# Patient Record
Sex: FEMALE | Race: BLACK OR AFRICAN AMERICAN | NOT HISPANIC OR LATINO | Employment: FULL TIME | URBAN - METROPOLITAN AREA
[De-identification: names, ages, dates, MRNs, and addresses within clinical notes are randomized per-mention and may not be internally consistent; named-entity substitution may affect disease eponyms.]

---

## 2017-01-08 ENCOUNTER — HOSPITAL ENCOUNTER (EMERGENCY)
Facility: HOSPITAL | Age: 32
Discharge: HOME | End: 2017-01-08
Attending: EMERGENCY MEDICINE
Payer: COMMERCIAL

## 2017-01-08 VITALS
RESPIRATION RATE: 16 BRPM | HEART RATE: 102 BPM | SYSTOLIC BLOOD PRESSURE: 139 MMHG | WEIGHT: 293 LBS | HEIGHT: 66 IN | BODY MASS INDEX: 47.09 KG/M2 | DIASTOLIC BLOOD PRESSURE: 81 MMHG | TEMPERATURE: 98 F | OXYGEN SATURATION: 99 %

## 2017-01-08 DIAGNOSIS — B96.89 ACUTE BACTERIAL RHINOSINUSITIS: Primary | ICD-10-CM

## 2017-01-08 DIAGNOSIS — J01.90 ACUTE BACTERIAL RHINOSINUSITIS: Primary | ICD-10-CM

## 2017-01-08 PROCEDURE — 63600175 PHARM REV CODE 636 W HCPCS: Performed by: EMERGENCY MEDICINE

## 2017-01-08 PROCEDURE — 96372 THER/PROPH/DIAG INJ SC/IM: CPT

## 2017-01-08 PROCEDURE — 25000003 PHARM REV CODE 250: Performed by: EMERGENCY MEDICINE

## 2017-01-08 PROCEDURE — 99283 EMERGENCY DEPT VISIT LOW MDM: CPT | Mod: 25

## 2017-01-08 RX ORDER — BETAMETHASONE SODIUM PHOSPHATE AND BETAMETHASONE ACETATE 3; 3 MG/ML; MG/ML
6 INJECTION, SUSPENSION INTRA-ARTICULAR; INTRALESIONAL; INTRAMUSCULAR; SOFT TISSUE
Status: COMPLETED | OUTPATIENT
Start: 2017-01-08 | End: 2017-01-08

## 2017-01-08 RX ORDER — OXYMETAZOLINE HCL 0.05 %
1 SPRAY, NON-AEROSOL (ML) NASAL
Status: COMPLETED | OUTPATIENT
Start: 2017-01-08 | End: 2017-01-08

## 2017-01-08 RX ORDER — DOXYCYCLINE HYCLATE 100 MG
100 TABLET ORAL 2 TIMES DAILY
Qty: 14 TABLET | Refills: 0 | Status: SHIPPED | OUTPATIENT
Start: 2017-01-08 | End: 2017-02-26

## 2017-01-08 RX ORDER — OXYMETAZOLINE HCL 0.05 %
1 SPRAY, NON-AEROSOL (ML) NASAL 2 TIMES DAILY
Qty: 15 ML | Refills: 0 | COMMUNITY
Start: 2017-01-08 | End: 2017-01-11

## 2017-01-08 RX ADMIN — BETAMETHASONE SODIUM PHOSPHATE AND BETAMETHASONE ACETATE 6 MG: 3; 3 INJECTION, SUSPENSION INTRA-ARTICULAR; INTRALESIONAL; INTRAMUSCULAR at 12:01

## 2017-01-08 RX ADMIN — OXYMETAZOLINE HYDROCHLORIDE 1 SPRAY: 5 SPRAY NASAL at 12:01

## 2017-01-08 NOTE — ED PROVIDER NOTES
Encounter Date: 1/8/2017       History     Chief Complaint   Patient presents with    Nasal Congestion     with sinus draining. mucinex 2 days ago. no fever     Review of patient's allergies indicates:   Allergen Reactions    Shellfish containing products Rash    Penicillins      Patient is a 31 y.o. female presenting with the following complaint: sinusitis.   Sinusitis    This is a recurrent problem. The current episode started several days ago. The problem has been unchanged. Associated symptoms include congestion, sinus pressure and cough. Pertinent negatives include no chills, no sweats, no swollen glands and no shortness of breath. She has tried nothing for the symptoms.     Past Medical History   Diagnosis Date    Hypertension     Psoriasis     Seizures     Stroke      No past medical history pertinent negatives.  Past Surgical History   Procedure Laterality Date    Right toe amputation        Family History   Problem Relation Age of Onset    Cancer Neg Hx      Social History   Substance Use Topics    Smoking status: Never Smoker    Smokeless tobacco: Never Used    Alcohol use No     Review of Systems   Constitutional: Negative for chills.   HENT: Positive for congestion and sinus pressure.    Respiratory: Positive for cough. Negative for shortness of breath.    Skin: Positive for rash.        Pt with intractable psoriasis involving both external ears   All other systems reviewed and are negative.      Physical Exam   Initial Vitals   BP Pulse Resp Temp SpO2   01/08/17 0023 01/08/17 0023 01/08/17 0023 01/08/17 0023 01/08/17 0023   139/81 102 16 98.4 °F (36.9 °C) 99 %     Physical Exam    Nursing note and vitals reviewed.  Constitutional: She appears well-nourished. No distress.   HENT:   Head: Normocephalic and atraumatic.   Nose: Mucosal edema and rhinorrhea present. Right sinus exhibits maxillary sinus tenderness. Right sinus exhibits no frontal sinus tenderness. Left sinus exhibits maxillary  sinus tenderness. Left sinus exhibits no frontal sinus tenderness.   Mouth/Throat: Oropharynx is clear and moist.   Severe psoriatic changes with oozing both external ears   Eyes: Conjunctivae and EOM are normal.   Neck: Normal range of motion.   Cardiovascular: Normal rate, regular rhythm and intact distal pulses.   Pulmonary/Chest: Breath sounds normal.   Abdominal: She exhibits no distension.   Musculoskeletal: Normal range of motion.   Lymphadenopathy:     She has no cervical adenopathy.   Neurological: She is alert and oriented to person, place, and time. She has normal strength.   Skin: Skin is warm.         ED Course   Procedures  Labs Reviewed - No data to display        Possible prehypertension +/or Hypertension. Advised to see PCP for same.                    ED Course     Clinical Impression:             ICD-10-CM ICD-9-CM   1. Acute bacterial rhinosinusitis J01.90 461.9    B96.89           Fernando Zuniga MD  01/08/17 0149

## 2017-01-08 NOTE — ED AVS SNAPSHOT
[facility]  [address]  [place] [address]               Denise Barron   2017 12:25 AM   ED    Description:  Female : 1985   Department:  [facility]           Your Care was Coordinated By:     Provider Role From To    Fernando Zuniga MD Attending Provider 17 0025 --      Reason for Visit     Nasal Congestion           Diagnoses this Visit        Comments    Acute bacterial rhinosinusitis    -  Primary       ED Disposition     ED Disposition Condition Comment    Discharge             To Do List           Follow-up Information     Follow up with Kenji Evans MD. Schedule an appointment as soon as possible for a visit in 1 week.    Specialty:  Family Medicine    Contact information:    [address] [facility] [address]  [phone]         These Medications        Disp Refills Start End    doxycycline (VIBRA-TABS) 100 MG tablet 14 tablet 0 2017     Take 1 tablet (100 mg total) by mouth 2 (two) times daily. - Oral      PURCHASE these Medications (No prescription required)        Start End    oxymetazoline (AFRIN) 0.05 % nasal spray 2017    Sig - Route: 1 spray by Nasal route 2 (two) times daily. - Nasal    Class: OTC      [facility]     [facility] On Call Nurse Care Line -  Assistance  Registered nurses in the [facility] provide clinical advisement, health education, appointment booking, and other advisory services.  Call for this free service at [phone].             Medications           Message regarding Medications     Verify the changes and/or additions to your medication regime listed below are the same as discussed with your clinician today.  If any of these changes or additions are incorrect, please notify your healthcare provider.        START taking these NEW medications        Refills    doxycycline (VIBRA-TABS) 100 MG tablet 0    Sig: Take 1  tablet (100 mg total) by mouth 2 (two) times daily.    Class: Print    Route: Oral    oxymetazoline (AFRIN) 0.05 % nasal spray 0    Si spray by Nasal route 2 (two) times daily.    Class: OTC    Route: Nasal      These medications were administered today        Dose Freq    oxymetazoline 0.05 % nasal spray 1 spray 1 spray ED 1 Time    Si spray by Each Nare route ED 1 Time.    Class: Normal    Route: Each Nare    betamethasone acetate-betamethasone sodium phosphate injection 6 mg 6 mg ED 1 Time    Sig: Inject 1 mL (6 mg total) into the muscle ED 1 Time.    Class: Normal    Route: Intramuscular           Verify that the below list of medications is an accurate representation of the medications you are currently taking.  If none reported, the list may be blank. If incorrect, please contact your healthcare provider. Carry this list with you in case of emergency.           Current Medications     amlodipine (NORVASC) 5 MG tablet Take 5 mg by mouth once daily.    betamethasone acetate-betamethasone sodium phosphate injection 6 mg Inject 1 mL (6 mg total) into the muscle ED 1 Time.    doxycycline (VIBRA-TABS) 100 MG tablet Take 1 tablet (100 mg total) by mouth 2 (two) times daily.    folic acid (FOLVITE) 400 MCG tablet Take 400 mcg by mouth once daily.    hydrochlorothiazide (MICROZIDE) 12.5 mg capsule Take 12.5 mg by mouth once daily.    hydrocodone-acetaminophen 10-325mg (NORCO)  mg Tab Take 1 tablet by mouth every 4 (four) hours as needed (pain).    lamotrigine (LAMICTAL) 200 MG tablet Take 200 mg by mouth nightly.    levocetirizine (XYZAL) 5 MG tablet Take 5 mg by mouth every evening.    losartan (COZAAR) 50 MG tablet Take 50 mg by mouth once daily.    oxymetazoline (AFRIN) 0.05 % nasal spray 1 spray by Nasal route 2 (two) times daily.    oxymetazoline 0.05 % nasal spray 1 spray 1 spray by Each Nare route ED 1 Time.    warfarin (COUMADIN) 6 MG tablet Take 6 mg by mouth once daily.           Clinical  "Reference Information           Your Vitals Were     BP Pulse Temp Resp Height Weight    139/81 (BP Location: Right arm, Patient Position: Sitting) 102 98.4 °F (36.9 °C) (Oral) 16 5' 6" (1.676 m) 136.1 kg (300 lb)    Last Period SpO2 BMI          12/26/2016 99% 48.42 kg/m2        Allergies as of 1/8/2017        Reactions    Shellfish Containing Products Rash    Penicillins       Immunizations Administered on Date of Encounter - 1/8/2017     None      ED Micro, Lab, POCT     None      ED Imaging Orders     None      Discharge References/Attachments     SINUSITIS (ANTIBIOTIC TREATMENT) (ENGLISH)      Your Scheduled Appointments     Jan 11, 2017  1:20 PM CST   Established Patient Visit with Justa Rabago PA-C   [facility] ([facility])    [address]  [place] [address]   [phone]              [facility] Sign-Up     Activating your [facility] account is as easy as 1-2-3!     1) Visit [email or web address], select Sign Up Now, enter this activation code and your date of birth, then select Next.  [email or web address]  Expires: 2/22/2017 12:42 AM      2) Create a username and password to use when you visit [facility] in the future and select a security question in case you lose your password and select Next.    3) Enter your e-mail address and click Sign Up!    Additional Information  If you have questions, please e-mail [email or web address] or call [phone] to talk to our [facility] staff. Remember, [facility] is NOT to be used for urgent needs. For medical emergencies, dial 911.          [facility] complies with applicable Federal civil rights laws and does not discriminate on the basis of race, color, national origin, age, disability, or sex.        Language Assistance Services     ATTENTION: Language assistance services are available, free of charge. Please call [phone].      ATENCIÓN: Si habla español, tiene a zamora disposición servicios gratuitos de asistencia lingüística. Llame " al [phone].     RESHMA Ý: N?u b?n nói Ti?ng Vi?t, có các d?ch v? h? tr? ngôn ng? mi?n phí dành cho b?n. G?i s? [phone].

## 2017-01-13 ENCOUNTER — HOSPITAL ENCOUNTER (EMERGENCY)
Facility: HOSPITAL | Age: 32
Discharge: HOME | End: 2017-01-13
Attending: EMERGENCY MEDICINE
Payer: COMMERCIAL

## 2017-01-13 VITALS
RESPIRATION RATE: 20 BRPM | TEMPERATURE: 99 F | SYSTOLIC BLOOD PRESSURE: 102 MMHG | WEIGHT: 292 LBS | BODY MASS INDEX: 46.93 KG/M2 | OXYGEN SATURATION: 100 % | HEART RATE: 99 BPM | DIASTOLIC BLOOD PRESSURE: 60 MMHG | HEIGHT: 66 IN

## 2017-01-13 DIAGNOSIS — R56.9 SEIZURES: Primary | ICD-10-CM

## 2017-01-13 DIAGNOSIS — R79.1 SUBTHERAPEUTIC INTERNATIONAL NORMALIZED RATIO (INR): ICD-10-CM

## 2017-01-13 DIAGNOSIS — N39.0 URINARY TRACT INFECTION WITHOUT HEMATURIA, SITE UNSPECIFIED: ICD-10-CM

## 2017-01-13 DIAGNOSIS — R51.9 HEADACHE, UNSPECIFIED HEADACHE TYPE: ICD-10-CM

## 2017-01-13 LAB
ALBUMIN SERPL BCP-MCNC: 3.3 G/DL
ALP SERPL-CCNC: 60 U/L
ALT SERPL W/O P-5'-P-CCNC: 19 U/L
ANION GAP SERPL CALC-SCNC: 13 MMOL/L
ANISOCYTOSIS BLD QL SMEAR: SLIGHT
AST SERPL-CCNC: 16 U/L
B-HCG UR QL: NEGATIVE
BACTERIA #/AREA URNS AUTO: ABNORMAL /HPF
BASOPHILS # BLD AUTO: 0.01 K/UL
BASOPHILS NFR BLD: 0.1 %
BILIRUB SERPL-MCNC: 0.4 MG/DL
BILIRUB UR QL STRIP: NEGATIVE
BUN SERPL-MCNC: 7 MG/DL
CALCIUM SERPL-MCNC: 9.2 MG/DL
CHLORIDE SERPL-SCNC: 106 MMOL/L
CLARITY UR REFRACT.AUTO: CLEAR
CO2 SERPL-SCNC: 19 MMOL/L
COLOR UR AUTO: YELLOW
CREAT SERPL-MCNC: 0.8 MG/DL
DIFFERENTIAL METHOD: ABNORMAL
EOSINOPHIL # BLD AUTO: 0.1 K/UL
EOSINOPHIL NFR BLD: 1.1 %
ERYTHROCYTE [DISTWIDTH] IN BLOOD BY AUTOMATED COUNT: 21.1 %
EST. GFR  (AFRICAN AMERICAN): >60 ML/MIN/1.73 M^2
EST. GFR  (NON AFRICAN AMERICAN): >60 ML/MIN/1.73 M^2
GLUCOSE SERPL-MCNC: 85 MG/DL
GLUCOSE UR QL STRIP: NEGATIVE
HCT VFR BLD AUTO: 33.2 %
HGB BLD-MCNC: 9.9 G/DL
HGB UR QL STRIP: ABNORMAL
HYPOCHROMIA BLD QL SMEAR: ABNORMAL
INR PPP: 1.2
KETONES UR QL STRIP: NEGATIVE
LEUKOCYTE ESTERASE UR QL STRIP: ABNORMAL
LYMPHOCYTES # BLD AUTO: 1.4 K/UL
LYMPHOCYTES NFR BLD: 13.8 %
MCH RBC QN AUTO: 20.3 PG
MCHC RBC AUTO-ENTMCNC: 29.8 %
MCV RBC AUTO: 68 FL
MICROSCOPIC COMMENT: ABNORMAL
MONOCYTES # BLD AUTO: 0.8 K/UL
MONOCYTES NFR BLD: 7.5 %
NEUTROPHILS # BLD AUTO: 8 K/UL
NEUTROPHILS NFR BLD: 77.5 %
NITRITE UR QL STRIP: NEGATIVE
OVALOCYTES BLD QL SMEAR: ABNORMAL
PH UR STRIP: 6 [PH] (ref 5–8)
PLATELET # BLD AUTO: 490 K/UL
PMV BLD AUTO: 10.1 FL
POIKILOCYTOSIS BLD QL SMEAR: SLIGHT
POLYCHROMASIA BLD QL SMEAR: ABNORMAL
POTASSIUM SERPL-SCNC: 4.1 MMOL/L
PROT SERPL-MCNC: 8.5 G/DL
PROT UR QL STRIP: NEGATIVE
PROTHROMBIN TIME: 12.7 SEC
RBC # BLD AUTO: 4.87 M/UL
RBC #/AREA URNS AUTO: 4 /HPF (ref 0–4)
SODIUM SERPL-SCNC: 138 MMOL/L
SP GR UR STRIP: >=1.03 (ref 1–1.03)
SQUAMOUS #/AREA URNS AUTO: 18 /HPF
TSH SERPL DL<=0.005 MIU/L-ACNC: 2.01 UIU/ML
URN SPEC COLLECT METH UR: ABNORMAL
UROBILINOGEN UR STRIP-ACNC: NEGATIVE EU/DL
WBC # BLD AUTO: 10.35 K/UL
WBC #/AREA URNS AUTO: 60 /HPF (ref 0–5)

## 2017-01-13 PROCEDURE — 25000003 PHARM REV CODE 250: Performed by: EMERGENCY MEDICINE

## 2017-01-13 PROCEDURE — 87086 URINE CULTURE/COLONY COUNT: CPT

## 2017-01-13 PROCEDURE — 84443 ASSAY THYROID STIM HORMONE: CPT

## 2017-01-13 PROCEDURE — 96375 TX/PRO/DX INJ NEW DRUG ADDON: CPT

## 2017-01-13 PROCEDURE — 80053 COMPREHEN METABOLIC PANEL: CPT

## 2017-01-13 PROCEDURE — 81000 URINALYSIS NONAUTO W/SCOPE: CPT

## 2017-01-13 PROCEDURE — 85610 PROTHROMBIN TIME: CPT

## 2017-01-13 PROCEDURE — 99285 EMERGENCY DEPT VISIT HI MDM: CPT | Mod: 25

## 2017-01-13 PROCEDURE — 81025 URINE PREGNANCY TEST: CPT

## 2017-01-13 PROCEDURE — 85025 COMPLETE CBC W/AUTO DIFF WBC: CPT

## 2017-01-13 PROCEDURE — 80175 DRUG SCREEN QUAN LAMOTRIGINE: CPT

## 2017-01-13 PROCEDURE — 63600175 PHARM REV CODE 636 W HCPCS: Performed by: EMERGENCY MEDICINE

## 2017-01-13 PROCEDURE — 96361 HYDRATE IV INFUSION ADD-ON: CPT

## 2017-01-13 PROCEDURE — 96374 THER/PROPH/DIAG INJ IV PUSH: CPT

## 2017-01-13 RX ORDER — LORAZEPAM 2 MG/ML
0.5 INJECTION INTRAMUSCULAR
Status: COMPLETED | OUTPATIENT
Start: 2017-01-13 | End: 2017-01-13

## 2017-01-13 RX ORDER — CIPROFLOXACIN AND DEXAMETHASONE 3; 1 MG/ML; MG/ML
SUSPENSION/ DROPS AURICULAR (OTIC)
Refills: 3 | COMMUNITY
Start: 2016-11-21 | End: 2017-02-26

## 2017-01-13 RX ORDER — LAMOTRIGINE 250 MG/1
250 TABLET, EXTENDED RELEASE ORAL DAILY
Qty: 30 TABLET | Refills: 1 | Status: SHIPPED | OUTPATIENT
Start: 2017-01-13 | End: 2017-05-02 | Stop reason: DRUGHIGH

## 2017-01-13 RX ORDER — METHOTREXATE 2.5 MG/1
2.5 TABLET ORAL
Refills: 0 | COMMUNITY
Start: 2016-12-13 | End: 2017-05-02

## 2017-01-13 RX ORDER — PREDNISONE 20 MG/1
20 TABLET ORAL DAILY
Refills: 0 | COMMUNITY
Start: 2016-11-15 | End: 2017-05-02

## 2017-01-13 RX ORDER — LAMOTRIGINE 200 MG/1
200 TABLET, EXTENDED RELEASE ORAL NIGHTLY PRN
Refills: 3 | COMMUNITY
Start: 2016-12-13 | End: 2017-01-13

## 2017-01-13 RX ORDER — CHOLECALCIFEROL (VITAMIN D3) 125 MCG
5000 CAPSULE ORAL DAILY
Refills: 1 | COMMUNITY
Start: 2016-10-06 | End: 2017-07-20

## 2017-01-13 RX ORDER — NITROFURANTOIN 25; 75 MG/1; MG/1
100 CAPSULE ORAL 2 TIMES DAILY
Qty: 10 CAPSULE | Refills: 0 | Status: SHIPPED | OUTPATIENT
Start: 2017-01-13 | End: 2017-01-18

## 2017-01-13 RX ORDER — FLUCONAZOLE 200 MG/1
200 TABLET ORAL WEEKLY
Refills: 0 | COMMUNITY
Start: 2016-12-13 | End: 2017-02-26

## 2017-01-13 RX ORDER — HYDROMORPHONE HYDROCHLORIDE 2 MG/ML
0.5 INJECTION, SOLUTION INTRAMUSCULAR; INTRAVENOUS; SUBCUTANEOUS
Status: COMPLETED | OUTPATIENT
Start: 2017-01-13 | End: 2017-01-13

## 2017-01-13 RX ORDER — LORAZEPAM 1 MG/1
0.5 TABLET ORAL EVERY 6 HOURS PRN
Qty: 10 TABLET | Refills: 0 | Status: SHIPPED | OUTPATIENT
Start: 2017-01-13 | End: 2017-02-26

## 2017-01-13 RX ORDER — METOCLOPRAMIDE HYDROCHLORIDE 5 MG/ML
10 INJECTION INTRAMUSCULAR; INTRAVENOUS
Status: COMPLETED | OUTPATIENT
Start: 2017-01-13 | End: 2017-01-13

## 2017-01-13 RX ORDER — FLUTICASONE PROPIONATE 50 MCG
2 SPRAY, SUSPENSION (ML) NASAL DAILY
Refills: 3 | COMMUNITY
Start: 2016-12-08 | End: 2017-08-31

## 2017-01-13 RX ADMIN — METOCLOPRAMIDE 10 MG: 5 INJECTION, SOLUTION INTRAMUSCULAR; INTRAVENOUS at 03:01

## 2017-01-13 RX ADMIN — HYDROMORPHONE HYDROCHLORIDE 0.5 MG: 2 INJECTION, SOLUTION INTRAMUSCULAR; INTRAVENOUS; SUBCUTANEOUS at 03:01

## 2017-01-13 RX ADMIN — LORAZEPAM 0.5 MG: 2 INJECTION, SOLUTION INTRAMUSCULAR; INTRAVENOUS at 03:01

## 2017-01-13 RX ADMIN — SODIUM CHLORIDE 1000 ML: 0.9 INJECTION, SOLUTION INTRAVENOUS at 03:01

## 2017-01-13 NOTE — ED NOTES
Called [facility] to make sure they received fax for request for medical records. They will send over.
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
## 2017-01-13 NOTE — ED AVS SNAPSHOT
[facility]  [address] [address] [address]               Denise Barron   2017  2:49 PM   ED    Description:  Female : 1985   Department:  [facility]           Your Care was Coordinated By:     Provider Role From To    Sigifredo Hull MD Attending Provider 17 1630 --      Reason for Visit     Seizures           Diagnoses this Visit        Comments    Seizures    -  Primary     Urinary tract infection without hematuria, site unspecified         Headache, unspecified headache type         Subtherapeutic international normalized ratio (INR)           ED Disposition     None           To Do List           Follow-up Information     Follow up with Kenji Evans MD. Schedule an appointment as soon as possible for a visit in 3 days.    Specialty:  Family Medicine    Why:  Follow-up with your primary care doctor the next 2-3 days for recheck.  Return to the emergency department for any worsening signs or symptoms.    Contact information:    [address] [facility] [address]  [phone]         These Medications        Disp Refills Start End    lamotrigine (LAMICTAL XR) 250 mg TR24 30 tablet 1 2017     Take 250 mg by mouth once daily. - Oral    lorazepam (ATIVAN) 1 MG tablet 10 tablet 0 2017     Take 0.5 tablets (0.5 mg total) by mouth every 6 (six) hours as needed for Anxiety. - Oral    nitrofurantoin, macrocrystal-monohydrate, (MACROBID) 100 MG capsule 10 capsule 0 2017    Take 1 capsule (100 mg total) by mouth 2 (two) times daily. - Oral      [facility]     [facility] On Call Nurse Care Line -  Assistance  Registered nurses in the [facility] provide clinical advisement, health education, appointment booking, and other advisory services.  Call for this free service at [phone].             Medications           Message regarding Medications     Verify the  changes and/or additions to your medication regime listed below are the same as discussed with your clinician today.  If any of these changes or additions are incorrect, please notify your healthcare provider.        START taking these NEW medications        Refills    lamotrigine (LAMICTAL XR) 250 mg TR24 1    Sig: Take 250 mg by mouth once daily.    Class: Print    Route: Oral    lorazepam (ATIVAN) 1 MG tablet 0    Sig: Take 0.5 tablets (0.5 mg total) by mouth every 6 (six) hours as needed for Anxiety.    Class: Print    Route: Oral    nitrofurantoin, macrocrystal-monohydrate, (MACROBID) 100 MG capsule 0    Sig: Take 1 capsule (100 mg total) by mouth 2 (two) times daily.    Class: Print    Route: Oral      These medications were administered today        Dose Freq    sodium chloride 0.9% bolus 1,000 mL 1,000 mL ED 1 Time    Sig: Inject 1,000 mLs into the vein ED 1 Time.    Class: Normal    Route: Intravenous    hydromorphone (PF) injection 0.5 mg 0.5 mg ED 1 Time    Sig: Inject 0.25 mLs (0.5 mg total) into the vein ED 1 Time.    Class: Normal    Route: Intravenous    metoclopramide HCl injection 10 mg 10 mg ED 1 Time    Sig: Inject 2 mLs (10 mg total) into the vein ED 1 Time.    Class: Normal    Route: Intravenous    lorazepam injection 0.5 mg 0.5 mg ED 1 Time    Sig: Inject 0.25 mLs (0.5 mg total) into the vein ED 1 Time.    Class: Normal    Route: Intravenous      STOP taking these medications     lamotrigine (LAMICTAL) 200 MG tablet Take 200 mg by mouth nightly.           Verify that the below list of medications is an accurate representation of the medications you are currently taking.  If none reported, the list may be blank. If incorrect, please contact your healthcare provider. Carry this list with you in case of emergency.           Current Medications     amlodipine (NORVASC) 5 MG tablet Take 5 mg by mouth once daily.    cholecalciferol, vitamin D3, 5,000 unit capsule 5,000 Units once daily.     CIPRODEX  "0.3-0.1 % DrpS     doxycycline (VIBRA-TABS) 100 MG tablet Take 1 tablet (100 mg total) by mouth 2 (two) times daily.    fluconazole (DIFLUCAN) 200 MG Tab 200 mg once a week.     fluticasone (FLONASE) 50 mcg/actuation nasal spray     folic acid (FOLVITE) 400 MCG tablet Take 400 mcg by mouth once daily.    hydrochlorothiazide (MICROZIDE) 12.5 mg capsule Take 12.5 mg by mouth once daily.    hydrocodone-acetaminophen 10-325mg (NORCO)  mg Tab Take 1 tablet by mouth every 4 (four) hours as needed (pain).    levocetirizine (XYZAL) 5 MG tablet Take 5 mg by mouth every evening.    losartan (COZAAR) 50 MG tablet Take 50 mg by mouth once daily.    methotrexate 2.5 MG Tab 2.5 mg every 7 days.     predniSONE (DELTASONE) 20 MG tablet 20 mg once daily.     warfarin (COUMADIN) 6 MG tablet Take 6 mg by mouth once daily.    lamotrigine (LAMICTAL XR) 250 mg TR24 Take 250 mg by mouth once daily.    lorazepam (ATIVAN) 1 MG tablet Take 0.5 tablets (0.5 mg total) by mouth every 6 (six) hours as needed for Anxiety.    lorazepam injection 0.5 mg Inject 0.25 mLs (0.5 mg total) into the vein ED 1 Time.    nitrofurantoin, macrocrystal-monohydrate, (MACROBID) 100 MG capsule Take 1 capsule (100 mg total) by mouth 2 (two) times daily.           Clinical Reference Information           Your Vitals Were     BP Pulse Temp Resp Height Weight    120/58 (BP Location: Left arm, Patient Position: Lying, BP Method: Automatic) 95 99.3 °F (37.4 °C) (Oral) 20 5' 6" (1.676 m) 132.5 kg (292 lb)    Last Period SpO2 BMI          12/26/2016 (Exact Date) 98% 47.13 kg/m2        Allergies as of 1/13/2017        Reactions    Shellfish Containing Products Rash    Penicillins       Immunizations Administered on Date of Encounter - 1/13/2017     None      ED Micro, Lab, POCT     Start Ordered       Status Ordering Provider    01/13/17 1507 01/13/17 1506  Urine culture **CANNOT BE ORDERED STAT**  Once      In process     01/13/17 1452 01/13/17 1451  Lamotrigine " level  Once      In process     01/13/17 1450 01/13/17 1451  CBC auto differential  STAT      Final result     01/13/17 1450 01/13/17 1451  Comprehensive metabolic panel  STAT      Final result     01/13/17 1450 01/13/17 1451  Protime-INR  STAT      Final result     01/13/17 1450 01/13/17 1451  TSH  STAT      Final result     01/13/17 1450 01/13/17 1451  Urinalysis  STAT      In process     01/13/17 1450 01/13/17 1451  Rapid Pregnancy, Urine  STAT      In process       ED Imaging Orders     Start Ordered       Status Ordering Provider    01/13/17 1450 01/13/17 1451    1 time imaging,   Status:  Canceled      Canceled       Discharge References/Attachments     HEADACHE, UNSPECIFIED (ENGLISH)    SEIZURE, RECURRENT (ADULT) (ENGLISH)    BLADDER INFECTION, FEMALE (ADULT) (ENGLISH)    NITROFURANTOIN ORAL TABLET (ENGLISH)    LORAZEPAM ORAL TABLET (ENGLISH)    ANXIETY REACTION (ENGLISH)      [facility] Sign-Up     Activating your [facility] account is as easy as 1-2-3!     1) Visit [email or web address], select Sign Up Now, enter this activation code and your date of birth, then select Next.  [email or web address]  Expires: 2/22/2017 12:42 AM      2) Create a username and password to use when you visit [facility] in the future and select a security question in case you lose your password and select Next.    3) Enter your e-mail address and click Sign Up!    Additional Information  If you have questions, please e-mail [email or web address] or call [phone] to talk to our [facility] staff. Remember, [facility] is NOT to be used for urgent needs. For medical emergencies, dial 911.          [facility] complies with applicable Federal civil rights laws and does not discriminate on the basis of race, color, national origin, age, disability, or sex.        Language Assistance Services     ATTENTION: Language assistance services are available, free of charge. Please call [phone].      ATENCIÓN: betty Regalado  a zamora disposición servicios gratuitos de asistencia lingüística. Llame al [phone].     RESHMA Ý: N?u b?n nói Ti?ng Vi?t, có các d?ch v? h? tr? ngôn ng? mi?n phí dành cho b?n. G?i s? [phone].

## 2017-01-13 NOTE — ED NOTES
Dr. Hull speaking  W/ pt's neurologist, Dr. Dennis at [facility], regarding pt condition at this time.

## 2017-01-15 LAB
BACTERIA UR CULT: NORMAL
BACTERIA UR CULT: NORMAL

## 2017-01-17 LAB — LAMOTRIGINE SERPL-MCNC: 3.3 UG/ML (ref 2–15)

## 2017-01-23 ENCOUNTER — HOSPITAL ENCOUNTER (EMERGENCY)
Facility: HOSPITAL | Age: 32
Discharge: HOME | End: 2017-01-23
Attending: EMERGENCY MEDICINE
Payer: COMMERCIAL

## 2017-01-23 VITALS
HEART RATE: 88 BPM | WEIGHT: 280 LBS | SYSTOLIC BLOOD PRESSURE: 126 MMHG | RESPIRATION RATE: 20 BRPM | BODY MASS INDEX: 45 KG/M2 | TEMPERATURE: 98 F | OXYGEN SATURATION: 100 % | DIASTOLIC BLOOD PRESSURE: 80 MMHG | HEIGHT: 66 IN

## 2017-01-23 DIAGNOSIS — S31.104A UNSPECIFIED OPEN WOUND OF ABDOMINAL WALL, LEFT LOWER QUADRANT WITHOUT PENETRATION INTO PERITONEAL CAVITY, INITIAL ENCOUNTER: Primary | ICD-10-CM

## 2017-01-23 PROCEDURE — 99283 EMERGENCY DEPT VISIT LOW MDM: CPT

## 2017-01-23 NOTE — ED PROVIDER NOTES
"Encounter Date: 1/23/2017       History     Chief Complaint   Patient presents with    Wound Check     "my site from surgery came open" LLQ lap site      1/23/2017, 8:44 AM.    History Provided by: patient       Denise Barron is a 31 y.o. female presenting to the ED for complaints of a small wound to the left lower quadrant of her abdomen.  The patient states that she was using the bathroom this morning and noticed a small amount of blood on her underwear along with a "string" that was coming from the wound.  The patient states that she had an appendectomy done just before Christmas and that things have been uneventful since the surgery.  The patient denies any abdominal pain and also denies any fever chills nausea or vomiting.  The patient notes that she was just concerned since she has "bad nerves".  There are no other major concerns or complaints noted at this time.          PCP: Kenji Evans MD         Medical History:   Past Medical History   Diagnosis Date    Hypertension     Psoriasis     Seizures     Stroke        Surgical History:   Past Surgical History   Procedure Laterality Date    Right toe amputation          Family History:   Family History   Problem Relation Age of Onset    Cancer Neg Hx        Social History:   Social History     Social History Main Topics    Smoking status: Never Smoker    Smokeless tobacco: Never Used    Alcohol use No    Drug use: No    Sexual activity: Not on file       Review of patient's allergies indicates:   Allergen Reactions    Shellfish containing products Rash    Penicillins      HPI  Past Medical History   Diagnosis Date    Hypertension     Psoriasis     Seizures     Stroke      No past medical history pertinent negatives.  Past Surgical History   Procedure Laterality Date    Right toe amputation        Family History   Problem Relation Age of Onset    Cancer Neg Hx      Social History   Substance Use Topics    Smoking status: Never Smoker "    Smokeless tobacco: Never Used    Alcohol use No     Review of Systems   Constitutional: Negative for fever.   HENT: Negative for sore throat.    Respiratory: Negative for shortness of breath.    Cardiovascular: Negative for chest pain.   Gastrointestinal: Negative for nausea.   Genitourinary: Negative for dysuria.   Musculoskeletal: Negative for back pain.   Skin: Positive for wound (Small wound to the left abdomen ). Negative for rash.   Neurological: Negative for weakness.   Hematological: Does not bruise/bleed easily.   All other systems reviewed and are negative.      Physical Exam   Initial Vitals   BP Pulse Resp Temp SpO2   01/23/17 0829 01/23/17 0829 01/23/17 0829 01/23/17 0829 01/23/17 0829   126/80 88 20 98 °F (36.7 °C) 100 %     Physical Exam  Nursing Notes and Vital Signs Reviewed.  Constitutional:  Well developed, well nourished.  She is awake & alert.  She is in no apparent distress  Head:  Atraumatic.  Normocephalic.    Eyes:  PERRL.  EOMI.  Conjunctivae are not pale. No scleral icterus.  ENT:  Mucous membranes are moist and intact.  Oropharynx is clear and symmetric.    Neck:  Supple. Full ROM.  No lymphadenopathy.  Cardiovascular:  Regular rate.  Regular rhythm. S1 and S2 heart sounds present.  No murmurs, rubs, or gallops.  Distal pulses are 2+ and symmetric.  Pulmonary/Chest:  No evidence of respiratory distress.  Clear to auscultation bilaterally.  No wheezing, rales or rhonchi.  Abdominal:  Soft and non-distended.  There is no tenderness.  No rebound, guarding, or rigidity.  Good bowel sounds.  No organomegaly.    Genitourinary: No CVA tenderness.  Musculoskeletal:  Moves all four extremities. No obvious deformities.  No edema. No calf tenderness.    Skin:  Skin is warm and dry. No rashes.  Noted small wound to the left lower quadrant approximately 1 mm in width without any purulence or erythema or tenderness to the area.    Neurological:  Alert, awake, and appropriate.  Normal speech.  No  "acute focal neurological deficits are appreciated.  Psychiatric:  Good eye contact.  Appropriate in content/context. Normal affect.    ED Course   Procedures  Labs Reviewed - No data to display           ED ONGOING VITALS:  Vitals:    01/23/17 0829   BP: 126/80   Pulse: 88   Resp: 20   Temp: 98 °F (36.7 °C)   TempSrc: Oral   SpO2: 100%   Weight: 127 kg (280 lb)   Height: 5' 6" (1.676 m)         ABNORMAL LAB VALUES:  Labs Reviewed - No data to display      ALL LAB VALUES:  No labs drawn at this time.      RADIOLOGY STUDIES:  Imaging Results     None            The above vital signs and test results have been reviewed by the emergency provider.       ED EVENTS:  8:49 AM - Re-evaluation: The patient is resting comfortably and is in no acute distress. She states that her symptoms have improved after treatment within ER. Discussed test results, shared treatment plan, specific conditions for return, and importance of follow up with patient and family.  She understands and agrees with the plan as discussed. Answered  her questions at this time. She has remained hemodynamically stable throughout the ED course and is appropriate for discharge home.  The patient will be discharged home with instructions to follow-up with her primary care physician and general surgeon in the next week or return to the ED for any worsening symptoms or any other major concern.          Pre-hypertension/Hypertension: The pt has been informed that they may have pre-hypertension or hypertension based on a blood pressure reading in the ED. I recommend that the pt call the PCP listed on their discharge instructions or a physician of their choice this week to arrange f/u for further evaluation of possible pre-hypertension or hypertension.                       ED Course     Clinical Impression:       ICD-10-CM ICD-9-CM   1. Unspecified open wound of abdominal wall, left lower quadrant without penetration into peritoneal cavity, initial encounter " S31.104A 879.4         Disposition:   Disposition: Discharged  Condition: Stable       Zcah Zamarripa MD  01/23/17 0849

## 2017-01-23 NOTE — ED AVS SNAPSHOT
[facility]  [address] [address] [address]               Denise Barron   2017  8:31 AM   ED    Description:  Female : 1985   Department:  [facility]           Your Care was Coordinated By:     Provider Role From To    Zach Zamarripa MD Attending Provider 17 0828 --      Reason for Visit     Wound Check           Diagnoses this Visit        Comments    Unspecified open wound of abdominal wall, left lower quadrant without penetration into peritoneal cavity, initial encounter    -  Primary       ED Disposition     None           To Do List           Follow-up Information     Follow up with Kenji Evans MD In 3 days.    Specialty:  Family Medicine    Contact information:    [address] [facility] [address]  [phone]          Follow up with Your general surgeon In 3 days.        Follow up with [facility].    Specialty:  Emergency Medicine    Why:  If symptoms worsen, Or worsening condition or any other major concern    Contact information:    [address] [address] [address] [phone]      [facility]     [facility] Care [facility] -  Assistance  Registered nurses in the [facility] provide clinical advisement, health education, appointment booking, and other advisory services.  Call for this free service at [phone].             Medications           Message regarding Medications     Verify the changes and/or additions to your medication regime listed below are the same as discussed with your clinician today.  If any of these changes or additions are incorrect, please notify your healthcare provider.             Verify that the below list of medications is an accurate representation of the medications you are currently taking.  If none reported, the list may be blank. If incorrect, please contact your healthcare provider. Carry  "this list with you in case of emergency.           Current Medications     amlodipine (NORVASC) 5 MG tablet Take 5 mg by mouth once daily.    cholecalciferol, vitamin D3, 5,000 unit capsule 5,000 Units once daily.     CIPRODEX 0.3-0.1 % DrpS     doxycycline (VIBRA-TABS) 100 MG tablet Take 1 tablet (100 mg total) by mouth 2 (two) times daily.    fluconazole (DIFLUCAN) 200 MG Tab 200 mg once a week.     fluticasone (FLONASE) 50 mcg/actuation nasal spray     folic acid (FOLVITE) 400 MCG tablet Take 400 mcg by mouth once daily.    hydrochlorothiazide (MICROZIDE) 12.5 mg capsule Take 12.5 mg by mouth once daily.    hydrocodone-acetaminophen 10-325mg (NORCO)  mg Tab Take 1 tablet by mouth every 4 (four) hours as needed (pain).    lamotrigine (LAMICTAL XR) 250 mg TR24 Take 250 mg by mouth once daily.    levocetirizine (XYZAL) 5 MG tablet Take 5 mg by mouth every evening.    lorazepam (ATIVAN) 1 MG tablet Take 0.5 tablets (0.5 mg total) by mouth every 6 (six) hours as needed for Anxiety.    losartan (COZAAR) 50 MG tablet Take 50 mg by mouth once daily.    methotrexate 2.5 MG Tab 2.5 mg every 7 days.     predniSONE (DELTASONE) 20 MG tablet 20 mg once daily.     warfarin (COUMADIN) 6 MG tablet Take 6 mg by mouth once daily.           Clinical Reference Information           Your Vitals Were     BP Pulse Temp Resp Height Weight    126/80 (BP Location: Left arm, Patient Position: Sitting) 88 98 °F (36.7 °C) (Oral) 20 5' 6" (1.676 m) 127 kg (280 lb)    Last Period SpO2 BMI          12/26/2016 (Exact Date) 100% 45.19 kg/m2        Allergies as of 1/23/2017        Reactions    Shellfish Containing Products Rash    Penicillins       Immunizations Administered on Date of Encounter - 1/23/2017     None      ED Micro, Lab, POCT     None      ED Imaging Orders     None      Discharge References/Attachments     POST OP WOUND CHECK, GENERAL (ENGLISH)      [facility] Sign-Up     Activating your [facility] account is as easy as 1-2-3! "     1) Visit [email or web address], select Sign Up Now, enter this activation code and your date of birth, then select Next.  [email or web address]  Expires: 2/22/2017 12:42 AM      2) Create a username and password to use when you visit [facility] in the future and select a security question in case you lose your password and select Next.    3) Enter your e-mail address and click Sign Up!    Additional Information  If you have questions, please e-mail [email or web address] or call [phone] to talk to our [employer] [facility] staff. Remember, [facility] is NOT to be used for urgent needs. For medical emergencies, dial 911.          [facility] complies with applicable Federal civil rights laws and does not discriminate on the basis of race, color, national origin, age, disability, or sex.        Language Assistance Services     ATTENTION: Language assistance services are available, free of charge. Please call [phone].      ATENCIÓN: Si habla español, tiene a zamora disposición servicios gratuitos de asistencia lingüística. Llame al [phone].     CHÚ Ý: N?u b?n nói Ti?ng Vi?t, có các d?ch v? h? tr? ngôn ng? mi?n phí dành cho b?n. G?i s? [phone].

## 2017-02-26 ENCOUNTER — HOSPITAL ENCOUNTER (EMERGENCY)
Facility: HOSPITAL | Age: 32
Discharge: HOME | End: 2017-02-26
Attending: EMERGENCY MEDICINE
Payer: COMMERCIAL

## 2017-02-26 VITALS
BODY MASS INDEX: 47.94 KG/M2 | DIASTOLIC BLOOD PRESSURE: 70 MMHG | TEMPERATURE: 98 F | OXYGEN SATURATION: 100 % | HEART RATE: 104 BPM | SYSTOLIC BLOOD PRESSURE: 126 MMHG | WEIGHT: 293 LBS | RESPIRATION RATE: 20 BRPM

## 2017-02-26 DIAGNOSIS — J06.9 UPPER RESPIRATORY TRACT INFECTION, UNSPECIFIED TYPE: ICD-10-CM

## 2017-02-26 DIAGNOSIS — L01.00 IMPETIGO: Primary | ICD-10-CM

## 2017-02-26 PROCEDURE — 99283 EMERGENCY DEPT VISIT LOW MDM: CPT

## 2017-02-26 RX ORDER — CLINDAMYCIN HYDROCHLORIDE 150 MG/1
300 CAPSULE ORAL EVERY 8 HOURS
Qty: 42 CAPSULE | Refills: 0 | Status: SHIPPED | OUTPATIENT
Start: 2017-02-26 | End: 2017-03-05

## 2017-02-26 NOTE — ED PROVIDER NOTES
"SCRIBE #1 NOTE: I, Severiano Goodman, am scribing for, and in the presence of, Octavia Martinez MD. I have scribed the entire note.      History      Chief Complaint   Patient presents with    Ear Drainage     Pt states "both of my ears are draining".       Review of patient's allergies indicates:   Allergen Reactions    Shellfish containing products Rash    Penicillins         HPI   HPI    2/26/2017, 1:58 AM   History obtained from the patient      History of Present Illness: Denise Barron is a 32 y.o. female patient who presents to the Emergency Department for an evaluation of ear drainige which onset gradually yesterday. Pt reports both of her ears are draining which prompted her to come into ED for an evalution. Symptoms are constant and moderae in severity. Associated sxs include cough, congestion, and diaphoresis. Patient denies any HA, dizziness, ligtheadedness, fever, chills, neck pain/stiffness, visual disturbance/photophobia, extremity weakness/numbness, dizziness, facial swelling, SOB, nausea, CP, abd pain, neck pain, back pain, and all other sxs at this time. No further complaints or concerns at this time.     Arrival mode: Personal vehicle    PCP: Kenji Evans MD       Past Medical History:  Past Medical History:   Diagnosis Date    Hypertension     Psoriasis     Seizures     Stroke        Past Surgical History:  Past Surgical History:   Procedure Laterality Date    APPENDECTOMY      right toe amputation            Family History:  Family History   Problem Relation Age of Onset    Cancer Neg Hx        Social History:  Social History     Social History Main Topics    Smoking status: Never Smoker    Smokeless tobacco: Never Used    Alcohol use No    Drug use: No    Sexual activity: Unknown       ROS   Review of Systems   Constitutional: Positive for diaphoresis. Negative for chills and fever.   HENT: Positive for congestion and ear discharge. Negative for facial swelling.    Eyes: " Negative for photophobia and visual disturbance.   Respiratory: Positive for cough. Negative for shortness of breath.    Cardiovascular: Negative for chest pain.   Gastrointestinal: Negative for abdominal pain and nausea.   Musculoskeletal: Negative for back pain and neck pain.   Neurological: Negative for dizziness, weakness, light-headedness and headaches.     Physical Exam    Initial Vitals   BP Pulse Resp Temp SpO2   -- -- -- -- --             Physical Exam  Nursing Notes and Vital Signs Reviewed.  Constitutional: Patient is in no acute distress. Awake and alert. Well-developed and well-nourished.  Head: Atraumatic. Normocephalic.  Eyes: PERRL. EOM intact. Conjunctivae are not pale. No scleral icterus.  ENT: Mucous membranes are moist. Oropharynx is clear and symmetric. Yellow crusty drainage noted from bilateral ears.    Neck: Supple. Full ROM. No lymphadenopathy.  Cardiovascular: Tachycardia. No murmurs, rubs, or gallops. Distal pulses are 2+ and symmetric.  Pulmonary/Chest: No respiratory distress. Clear to auscultation bilaterally. No wheezing, rales, or rhonchi.  Abdominal: Soft and non-distended.  There is no tenderness.  No rebound, guarding, or rigidity. Good bowel sounds.  Musculoskeletal: Moves all extremities. No obvious deformities. No edema. No calf tenderness.  Skin: Warm and dry.  Neurological:  Alert, awake, and appropriate.  Normal speech.  No acute focal neurological deficits are appreciated.  Psychiatric: Normal affect. Good eye contact. Appropriate in content.    ED Course    Procedures  ED Vital Signs:  Vitals:    02/26/17 0201   BP: 126/70   Pulse: 104   Resp: 20   Temp: 98 °F (36.7 °C)   TempSrc: Oral   SpO2: 100%   Weight: 134.7 kg (297 lb)            The Emergency Provider reviewed the vital signs and test results, which are outlined above.    ED Discussion     2:29 AM: Reassessed pt at this time. Pt is awake, alert, and in NAD. Discussed with pt all pertinent ED information. Discussed  pt dx of impetigo and plan of tx. Gave pt all f/u and return to the ED instructions. All questions and concerns were addressed at this time. Pt expresses understanding of information and instructions, and is comfortable with plan to discharge. Pt is stable for discharge.    I discussed with patient and/or family/caretaker that evaluation in the ED does not suggest any emergent or life threatening medical conditions requiring immediate intervention beyond what was provided in the ED, and I believe patient is safe for discharge.  Regardless, an unremarkable evaluation in the ED does not preclude the development or presence of a serious of life threatening condition. As such, patient was instructed to return immediately for any worsening or change in current symptoms.    Pre-hypertension/Hypertension: The pt has been informed that they may have pre-hypertension or hypertension based on a blood pressure reading in the ED. I recommend that the pt call the PCP listed on their discharge instructions or a physician of their choice this week to arrange f/u for further evaluation of possible pre-hypertension or hypertension.     ED Medication(s):  Medications - No data to display    Discharge Medication List as of 2/26/2017  2:28 AM      START taking these medications    Details   clindamycin (CLEOCIN) 150 MG capsule Take 2 capsules (300 mg total) by mouth every 8 (eight) hours., Starting 2/26/2017, Until Sun 3/5/17, Print             Follow-up Information     Follow up with Kenji Evans MD In 2 days.    Specialty:  Family Medicine    Contact information:    [address] [facility]  [facility] [address] [phone]          Follow up with [facility].    Specialty:  Emergency Medicine    Why:  As needed, If symptoms worsen    Contact information:    [address] [address] [address] [phone]            Medical Decision Making              Scribe Attestation:   Scribe  #1: I performed the above scribed service and the documentation accurately describes the services I performed. I attest to the accuracy of the note.    Attending:   Physician Attestation Statement for Scribe #1: I, Octavia Martinez MD, personally performed the services described in this documentation, as scribed by Severiano Goodman, in my presence, and it is both accurate and complete.          Clinical Impression       ICD-10-CM ICD-9-CM   1. Impetigo L01.00 684   2. Upper respiratory tract infection, unspecified type J06.9 465.9       Disposition:   Disposition: Discharged  Condition: Stable         Octavia Martinez MD  02/27/17 0616

## 2017-02-26 NOTE — ED AVS SNAPSHOT
[facility]  [address] [address] [address]               Denise Barron   2017  2:00 AM   ED    Description:  Female : 1985   Department:  [facility]           Your Care was Coordinated By:     Provider Role From To    Octavia Martinez MD Attending Provider 17 0212 --      Reason for Visit     Ear Drainage           Diagnoses this Visit        Comments    Impetigo    -  Primary     Upper respiratory tract infection, unspecified type           ED Disposition     ED Disposition Condition Comment    Discharge             To Do List           Follow-up Information     Follow up with Kenji Evans MD In 2 days.    Specialty:  Family Medicine    Contact information:    [address] [facility]  [facility] [address]  [phone]          Follow up with [facility].    Specialty:  Emergency Medicine    Why:  As needed, If symptoms worsen    Contact information:    [address] [address] [id number] [phone]       These Medications        Disp Refills Start End    clindamycin (CLEOCIN) 150 MG capsule 42 capsule 0 2017 3/5/2017    Take 2 capsules (300 mg total) by mouth every 8 (eight) hours. - Oral      [facility]     [facility] -  Assistance  Registered nurses in the [facility] provide clinical advisement, health education, appointment booking, and other advisory services.  Call for this free service at [phone].             Medications           Message regarding Medications     Verify the changes and/or additions to your medication regime listed below are the same as discussed with your clinician today.  If any of these changes or additions are incorrect, please notify your healthcare provider.        START taking these NEW medications        Refills    clindamycin (CLEOCIN) 150 MG capsule 0    Sig: Take 2 capsules (300 mg  total) by mouth every 8 (eight) hours.    Class: Print    Route: Oral      STOP taking these medications     doxycycline (VIBRA-TABS) 100 MG tablet Take 1 tablet (100 mg total) by mouth 2 (two) times daily.    lorazepam (ATIVAN) 1 MG tablet Take 0.5 tablets (0.5 mg total) by mouth every 6 (six) hours as needed for Anxiety.    CIPRODEX 0.3-0.1 % DrpS     fluconazole (DIFLUCAN) 200 MG Tab 200 mg once a week.     folic acid (FOLVITE) 400 MCG tablet Take 400 mcg by mouth once daily.    levocetirizine (XYZAL) 5 MG tablet Take 5 mg by mouth every evening.           Verify that the below list of medications is an accurate representation of the medications you are currently taking.  If none reported, the list may be blank. If incorrect, please contact your healthcare provider. Carry this list with you in case of emergency.           Current Medications     amlodipine (NORVASC) 5 MG tablet Take 5 mg by mouth once daily.    cholecalciferol, vitamin D3, 5,000 unit capsule 5,000 Units once daily.     clindamycin (CLEOCIN) 150 MG capsule Take 2 capsules (300 mg total) by mouth every 8 (eight) hours.    fluticasone (FLONASE) 50 mcg/actuation nasal spray     hydrochlorothiazide (MICROZIDE) 12.5 mg capsule Take 12.5 mg by mouth once daily.    hydrocodone-acetaminophen 10-325mg (NORCO)  mg Tab Take 1 tablet by mouth every 4 (four) hours as needed (pain).    lamotrigine (LAMICTAL XR) 250 mg TR24 Take 250 mg by mouth once daily.    losartan (COZAAR) 50 MG tablet Take 50 mg by mouth once daily.    methotrexate 2.5 MG Tab 2.5 mg every 7 days.     predniSONE (DELTASONE) 20 MG tablet 20 mg once daily.     warfarin (COUMADIN) 6 MG tablet Take 6 mg by mouth once daily.           Clinical Reference Information           Your Vitals Were     BP                   126/70 (BP Location: Right arm, Patient Position: Sitting)           Allergies as of 2/26/2017        Reactions    Shellfish Containing Products Rash    Penicillins      "  Immunizations Administered on Date of Encounter - 2/26/2017     None      ED Micro, Lab, POCT     None      ED Imaging Orders     None        Discharge Instructions         Impetigo  Impetigo is a common bacterial infection of the skin that can appear on many parts of the body. It can happen to anyone, of any age, but is more common in children. For this reason, it used to be called "school sores."  Causes  Its normal to get scrapes on your body from activity or from scratching your skin. The skin normally has bacteria on it. Sometimes an impetigo infection can start on healthy skin. But it usually starts when there is an injury to the skin, or break in the skin. Although nothing usually happens, the bacteria normally on the skin can cause infection. This is the most common way people get impetigo.  Impetigo is very contagious. So once there is an infection, it needs to be treated so it doesn't get worse, spread to other areas, or to other people. Impetigo can easily be passed to other family members, friends, schoolmates, or co-workers, through scratching, rubbing, or touching an infected area. Common causes include:  · After a cold  · Bites  · From another infected person  · Injury to skin  · Insect bites  · Other skin problems that are infected, such as eczema  · Scratches  Symptoms  There is often a skin injury like a scratch, scrape, or insect bite that may have gone unnoticed or been ignored before the infection began. Symptoms of impetigo include:  · Red, inflamed area or rash  · One or many red bumps  · Bumps that turn into blisters filled with yellow fluid or pus  · Blisters break or leak causing honey-colored crusting or scabbing over the area  · Skin sores that spread to other surrounding areas  Home care  The following guidelines will help you care for your infection at home.  Wound care  · Trim fingernails and cover sores with an adhesive bandage, if needed, to prevent scratching. Picking at the " sores may leave a scar.  · If the infection is on or around your lips, don't lick or chew on the sores. This will make the infection worse.  · If a bandage or dressing is used, you can put a nonstick dressing over it.  · Wash your hands and your childs hands often. This will avoid spreading the infection to other parts of the body and to other people. Do not share the infected persons washcloths, towels, pillows, sheets, or clothes with others. Wash these items in hot water before using again.  · Clean the area several times a day. You dont want to scrub the area. The best way to do this is to soak the sores in warm, soapy water until they get soft enough to be wiped away. This will help remove the crust that forms from the dried liquid. In areas that you cant soak, like the mouth or face, you can put a clean, warm washcloth over the infected are for 5 to 10 minutes at a time, until the scabs soften enough to remove.  Medicines  · You can use over-the-counter medicine as directed based on age and weight for pain, fever, fussiness, or discomfort, unless another medicine was prescribed. In infants ages 6 months and older, you may use ibuprofen as well as acetaminophen. You can alternate them, or use both together. They work differently and are a different class of medicines, so taking them together is not an overdose. If you or your child has chronic liver or kidney disease or ever had a stomach ulcer or gastrointestinal bleeding, talk with your healthcare provider before using these medicines. Also talk with your healthcare provider if your child is taking blood-thinner medicines.  · Do not give aspirin to your child. Aspirin should never be used in children ages 18 and younger who is ill with a fever. It may cause severe disease or death.   · Impetigo can often be cured with topical creams. Apply these as directed by your healthcare provider.  · If you were given oral antibiotics, take them until they are used  up. It is important to finish the antibiotics even if the wound looks better to make sure the infection has cleared.  Follow-up care  Follow up with your healthcare provider if the sores continue to spread after 3 days of treatment. It will take about 7 to 10 days to heal completely.  Your child should stay out of school until completing 2 full days of antibiotic treatment.  When to seek medical advice  Call your healthcare provider right away if any of the following occur:  · Fever of 100.4°F (38°C) or higher, or as directed  · Increased amounts of fluid or pus coming from the sores  · Increasing number of sores or spreading areas of redness after 2 days of treatment with antibiotics  · Increasing swelling or pain  · Loss of appetite or vomiting  · Unusual drowsiness, weakness, or change in behavior  Date Last Reviewed: 8/1/2016  © 7365-1454 [employer]. [address] [place] [address]. All rights reserved. This information is not intended as a substitute for professional medical care. Always follow your healthcare professional's instructions.          [facility] Sign-Up     Activating your [facility] account is as easy as 1-2-3!     1) Visit [email or web address], select Sign Up Now, enter this activation code and your date of birth, then select Next.  [email or web address]  Expires: 4/12/2017  2:28 AM      2) Create a username and password to use when you visit [facility] in the future and select a security question in case you lose your password and select Next.    3) Enter your e-mail address and click Sign Up!    Additional Information  If you have questions, please e-mail [email or web address] or call [phone] to talk to our [facility] staff. Remember, [facility] is NOT to be used for urgent needs. For medical emergencies, dial 911.          [facility] complies with applicable Federal civil rights laws and does not discriminate on the basis of race, color, national origin, age,  disability, or sex.        Language Assistance Services     ATTENTION: Language assistance services are available, free of charge. Please call [phone].      ATENCIÓN: Si habla jujuañol, tiene a zamora disposición servicios gratuitos de asistencia lingüística. Llame al [phone].     CHÚ Ý: N?u b?n nói Ti?ng Vi?t, có các d?ch v? h? tr? ngôn ng? mi?n phí dành cho b?n. G?i s? [phone].

## 2017-02-26 NOTE — DISCHARGE INSTRUCTIONS
"  Impetigo  Impetigo is a common bacterial infection of the skin that can appear on many parts of the body. It can happen to anyone, of any age, but is more common in children. For this reason, it used to be called "school sores."  Causes  Its normal to get scrapes on your body from activity or from scratching your skin. The skin normally has bacteria on it. Sometimes an impetigo infection can start on healthy skin. But it usually starts when there is an injury to the skin, or break in the skin. Although nothing usually happens, the bacteria normally on the skin can cause infection. This is the most common way people get impetigo.  Impetigo is very contagious. So once there is an infection, it needs to be treated so it doesn't get worse, spread to other areas, or to other people. Impetigo can easily be passed to other family members, friends, schoolmates, or co-workers, through scratching, rubbing, or touching an infected area. Common causes include:  · After a cold  · Bites  · From another infected person  · Injury to skin  · Insect bites  · Other skin problems that are infected, such as eczema  · Scratches  Symptoms  There is often a skin injury like a scratch, scrape, or insect bite that may have gone unnoticed or been ignored before the infection began. Symptoms of impetigo include:  · Red, inflamed area or rash  · One or many red bumps  · Bumps that turn into blisters filled with yellow fluid or pus  · Blisters break or leak causing honey-colored crusting or scabbing over the area  · Skin sores that spread to other surrounding areas  Home care  The following guidelines will help you care for your infection at home.  Wound care  · Trim fingernails and cover sores with an adhesive bandage, if needed, to prevent scratching. Picking at the sores may leave a scar.  · If the infection is on or around your lips, don't lick or chew on the sores. This will make the infection worse.  · If a bandage or dressing is used, " you can put a nonstick dressing over it.  · Wash your hands and your childs hands often. This will avoid spreading the infection to other parts of the body and to other people. Do not share the infected persons washcloths, towels, pillows, sheets, or clothes with others. Wash these items in hot water before using again.  · Clean the area several times a day. You dont want to scrub the area. The best way to do this is to soak the sores in warm, soapy water until they get soft enough to be wiped away. This will help remove the crust that forms from the dried liquid. In areas that you cant soak, like the mouth or face, you can put a clean, warm washcloth over the infected are for 5 to 10 minutes at a time, until the scabs soften enough to remove.  Medicines  · You can use over-the-counter medicine as directed based on age and weight for pain, fever, fussiness, or discomfort, unless another medicine was prescribed. In infants ages 6 months and older, you may use ibuprofen as well as acetaminophen. You can alternate them, or use both together. They work differently and are a different class of medicines, so taking them together is not an overdose. If you or your child has chronic liver or kidney disease or ever had a stomach ulcer or gastrointestinal bleeding, talk with your healthcare provider before using these medicines. Also talk with your healthcare provider if your child is taking blood-thinner medicines.  · Do not give aspirin to your child. Aspirin should never be used in children ages 18 and younger who is ill with a fever. It may cause severe disease or death.   · Impetigo can often be cured with topical creams. Apply these as directed by your healthcare provider.  · If you were given oral antibiotics, take them until they are used up. It is important to finish the antibiotics even if the wound looks better to make sure the infection has cleared.  Follow-up care  Follow up with your healthcare provider if  the sores continue to spread after 3 days of treatment. It will take about 7 to 10 days to heal completely.  Your child should stay out of school until completing 2 full days of antibiotic treatment.  When to seek medical advice  Call your healthcare provider right away if any of the following occur:  · Fever of 100.4°F (38°C) or higher, or as directed  · Increased amounts of fluid or pus coming from the sores  · Increasing number of sores or spreading areas of redness after 2 days of treatment with antibiotics  · Increasing swelling or pain  · Loss of appetite or vomiting  · Unusual drowsiness, weakness, or change in behavior  Date Last Reviewed: 8/1/2016 © 2000-2016 [employer]. [address] [place] [address], [place] [address]. All rights reserved. This information is not intended as a substitute for professional medical care. Always follow your healthcare professional's instructions.

## 2017-04-03 ENCOUNTER — HOSPITAL ENCOUNTER (EMERGENCY)
Facility: HOSPITAL | Age: 32
Discharge: HOME | End: 2017-04-03
Attending: EMERGENCY MEDICINE
Payer: COMMERCIAL

## 2017-04-03 VITALS
TEMPERATURE: 99 F | BODY MASS INDEX: 47.35 KG/M2 | DIASTOLIC BLOOD PRESSURE: 72 MMHG | WEIGHT: 293 LBS | HEART RATE: 100 BPM | RESPIRATION RATE: 20 BRPM | SYSTOLIC BLOOD PRESSURE: 154 MMHG | OXYGEN SATURATION: 99 %

## 2017-04-03 DIAGNOSIS — I10 ESSENTIAL HYPERTENSION: ICD-10-CM

## 2017-04-03 DIAGNOSIS — M54.9 UPPER BACK PAIN ON RIGHT SIDE: Primary | ICD-10-CM

## 2017-04-03 DIAGNOSIS — L40.9 PSORIASIS: ICD-10-CM

## 2017-04-03 PROCEDURE — 99283 EMERGENCY DEPT VISIT LOW MDM: CPT

## 2017-04-03 PROCEDURE — 25000003 PHARM REV CODE 250: Performed by: EMERGENCY MEDICINE

## 2017-04-03 RX ORDER — BACLOFEN 20 MG/1
20 TABLET ORAL 3 TIMES DAILY PRN
Qty: 21 TABLET | Refills: 0 | Status: SHIPPED | OUTPATIENT
Start: 2017-04-03 | End: 2017-05-02

## 2017-04-03 RX ORDER — HYDROCODONE BITARTRATE AND ACETAMINOPHEN 7.5; 325 MG/1; MG/1
1 TABLET ORAL EVERY 6 HOURS PRN
Qty: 11 TABLET | Refills: 0 | Status: SHIPPED | OUTPATIENT
Start: 2017-04-03 | End: 2017-05-02

## 2017-04-03 RX ORDER — HYDROCODONE BITARTRATE AND ACETAMINOPHEN 10; 325 MG/1; MG/1
1 TABLET ORAL
Status: COMPLETED | OUTPATIENT
Start: 2017-04-03 | End: 2017-04-03

## 2017-04-03 RX ADMIN — HYDROCODONE BITARTRATE AND ACETAMINOPHEN 1 TABLET: 10; 325 TABLET ORAL at 07:04

## 2017-04-03 NOTE — ED PROVIDER NOTES
"Encounter Date: 4/3/2017       History     Chief Complaint   Patient presents with    Back Pain     right side upper back pain since last night. denies any injury. tylenol at 4am     Review of patient's allergies indicates:   Allergen Reactions    Shellfish containing products Rash    Penicillins      HPI Comments: CHIEF COMPLIANT: Back Pain (right side upper back pain since last night. denies any injury. tylenol at 4am)      4/3/2017, 7:15 AM    The history is provided by the patient. Denise Barron is a 32 y.o. female presenting to the ED for right  Upper back pain.  Pain started yesterday. Pain is located to the right lateral  upper back.  There is no radiation. Pain is described as "thumping pain".  Patient denies any ripping, tearing, or sharp sensation with it. She tried tylenol at 4 AM without relief.  Patient also denies any cough, fever, difficulty breathing, lightheadedness, dizziness, chest pain, chest pressure, new numbness or weakness to one side.  Patient denies personal history of cancer, loss of control of bowel or bladder, or new numbness or weakness.  Nothing makes it better or worse.    PCP: Kenji Evans MD  Specialist:       The history is provided by the patient.     Past Medical History:   Diagnosis Date    Hypertension     Psoriasis     Seizures     Stroke      Past Surgical History:   Procedure Laterality Date    APPENDECTOMY      right toe amputation        Family History   Problem Relation Age of Onset    Cancer Neg Hx      Social History   Substance Use Topics    Smoking status: Never Smoker    Smokeless tobacco: Never Used    Alcohol use No     Review of Systems   Constitutional: Negative for fever.   HENT: Negative for sore throat.    Respiratory: Negative for shortness of breath.    Cardiovascular: Negative for chest pain.   Gastrointestinal: Negative for nausea.   Genitourinary: Negative for dysuria.   Musculoskeletal: Positive for back pain (Right upper back " pain). Negative for neck pain and neck stiffness.   Skin: Negative for rash.   Neurological: Negative for weakness.   Hematological: Does not bruise/bleed easily.       Physical Exam   Initial Vitals   BP Pulse Resp Temp SpO2   04/03/17 0700 04/03/17 0700 04/03/17 0700 04/03/17 0700 04/03/17 0700   154/72 100 20 99 °F (37.2 °C) 99 %     Vitals:    04/03/17 0700   BP: (!) 154/72   Pulse: 100   Resp: 20   Temp: 99 °F (37.2 °C)   TempSrc: Oral   SpO2: 99%   Weight: 133.1 kg (293 lb 6 oz)         Physical Exam    Nursing note and vitals reviewed.  Constitutional: She appears well-developed and well-nourished.   HENT:   Head: Normocephalic and atraumatic.   Eyes: Conjunctivae and EOM are normal. Pupils are equal, round, and reactive to light.   Neck: Normal range of motion.   Cardiovascular: Normal rate, regular rhythm and normal heart sounds.   Pulmonary/Chest: Breath sounds normal. No respiratory distress.   Abdominal: Soft. Bowel sounds are normal. She exhibits no mass. There is no rebound and no guarding.   Musculoskeletal: Normal range of motion.   No midline back tenderness noted to the cervical thoracic or lumbar spine.   Neurological: She is alert and oriented to person, place, and time. She has normal strength. No cranial nerve deficit.   Cranial nerves II-XII intact.  Intact median ulnar and radial nerves both sensory and motor bilaterally. strength +5 out of 5 equal bila   Skin: Skin is warm and dry.   Scattered plaques are noted along the forearms or upper back.  Scattered papules are located symmetrically along the back.  Patient has a history of psoriasis.   Psychiatric: She has a normal mood and affect. Her speech is normal and behavior is normal. Thought content normal.         ED Course   Procedures  Labs Reviewed - No data to display                            ED Course     Medications   hydrocodone-acetaminophen 10-325mg per tablet 1 tablet (not administered)       7:32 AM Reassessment:   Almaz reassessed the pt.  The pt is resting comfortably and is NAD.  Pt states their sx have improved. Discussed test results, shared treatment plan, specific conditions for return, and the need for f/u.  Answered their questions at this time.  Pt understands and agrees to the plan.  The pt has remained hemodynamically stable through ED course and is stable for discharge.     Follow-up Information     Follow up with Kenji Evans MD. Schedule an appointment as soon as possible for a visit in 2 days.    Specialty:  Family Medicine    Why:  Return to the ED for:  worsening pain, nausea, vomiting, shortness of breath, passing out, weakness, numbness/weakness on one side or other concerns.     Contact information:    [address] [facility]  [place] [id number] [phone]              New Prescriptions    BACLOFEN (LIORESAL) 20 MG TABLET    Take 1 tablet (20 mg total) by mouth 3 (three) times daily as needed (musle spasm).    HYDROCODONE-ACETAMINOPHEN 7.5-325MG (NORCO) 7.5-325 MG PER TABLET    Take 1 tablet by mouth every 6 (six) hours as needed for Pain.        Discontinued Medications    HYDROCHLOROTHIAZIDE (MICROZIDE) 12.5 MG CAPSULE    Take 12.5 mg by mouth once daily.    HYDROCODONE-ACETAMINOPHEN 10-325MG (NORCO)  MG TAB    Take 1 tablet by mouth every 4 (four) hours as needed (pain).         Pre-hypertension/Hypertension: The pt has been informed that they may have pre-hypertension or hypertension based on a blood pressure reading in the ED. I recommend that the pt call the PCP listed on their discharge instructions or a physician of their choice this week to arrange f/u for further evaluation of possible pre-hypertension or hypertension.     Clinical Impression:       ICD-10-CM ICD-9-CM   1. Upper back pain on right side M54.9 724.5   2. Essential hypertension I10 401.9   3. Psoriasis L40.9 696.1         Disposition:   Disposition: Discharged  Condition: Stable       Batool PAULRory  DO Almaz  04/03/17 0906

## 2017-04-03 NOTE — ED AVS SNAPSHOT
[facility]  [address] [address] [address]               Denise Barron   4/3/2017  7:00 AM   ED    Description:  Female : 1985   Department:  [facility]           Your Care was Coordinated By:     Provider Role From To    Batool Muse DO Attending Provider 17 0715 --      Reason for Visit     Back Pain           Diagnoses this Visit        Comments    Upper back pain on right side    -  Primary     Essential hypertension         Psoriasis           ED Disposition     None           To Do List           Follow-up Information     Follow up with Kenji Evans MD. Schedule an appointment as soon as possible for a visit in 2 days.    Specialty:  Family Medicine    Why:  Return to the ED for:  worsening pain, nausea, vomiting, shortness of breath, passing out, weakness, numbness/weakness on one side or other concerns.     Contact information:    [address] [facility] [address]  [phone]         These Medications        Disp Refills Start End    hydrocodone-acetaminophen 7.5-325mg (NORCO) 7.5-325 mg per tablet 11 tablet 0 4/3/2017     Take 1 tablet by mouth every 6 (six) hours as needed for Pain. - Oral    baclofen (LIORESAL) 20 MG tablet 21 tablet 0 4/3/2017 4/3/2018    Take 1 tablet (20 mg total) by mouth 3 (three) times daily as needed (musle spasm). - Oral      [facility]     [facility] On [facility] Nurse Care Line - 24/7 Assistance  Unless otherwise directed by your provider, please contact [facility], our nurse care line that is available for 24/7 assistance.     Registered nurses in the [facility] provide: appointment scheduling, clinical advisement, health education, and other advisory services.  Call: [phone] (toll free)               Medications           Message regarding Medications     Verify the changes and/or additions to your medication regime listed below  are the same as discussed with your clinician today.  If any of these changes or additions are incorrect, please notify your healthcare provider.        START taking these NEW medications        Refills    hydrocodone-acetaminophen 7.5-325mg (NORCO) 7.5-325 mg per tablet 0    Sig: Take 1 tablet by mouth every 6 (six) hours as needed for Pain.    Class: Print    Route: Oral    baclofen (LIORESAL) 20 MG tablet 0    Sig: Take 1 tablet (20 mg total) by mouth 3 (three) times daily as needed (musle spasm).    Class: Print    Route: Oral      These medications were administered today        Dose Freq    hydrocodone-acetaminophen 10-325mg per tablet 1 tablet 1 tablet ED 1 Time    Sig: Take 1 tablet by mouth ED 1 Time.    Class: Normal    Route: Oral      STOP taking these medications     hydrocodone-acetaminophen 10-325mg (NORCO)  mg Tab Take 1 tablet by mouth every 4 (four) hours as needed (pain).    hydrochlorothiazide (MICROZIDE) 12.5 mg capsule Take 12.5 mg by mouth once daily.           Verify that the below list of medications is an accurate representation of the medications you are currently taking.  If none reported, the list may be blank. If incorrect, please contact your healthcare provider. Carry this list with you in case of emergency.           Current Medications     lamotrigine (LAMICTAL XR) 250 mg TR24 Take 250 mg by mouth once daily.    losartan (COZAAR) 50 MG tablet Take 50 mg by mouth once daily.    warfarin (COUMADIN) 6 MG tablet Take 10 mg by mouth once daily.     amlodipine (NORVASC) 5 MG tablet Take 5 mg by mouth once daily.    baclofen (LIORESAL) 20 MG tablet Take 1 tablet (20 mg total) by mouth 3 (three) times daily as needed (musle spasm).    cholecalciferol, vitamin D3, 5,000 unit capsule 5,000 Units once daily.     fluticasone (FLONASE) 50 mcg/actuation nasal spray     hydrocodone-acetaminophen 10-325mg per tablet 1 tablet Take 1 tablet by mouth ED 1 Time.    hydrocodone-acetaminophen  7.5-325mg (NORCO) 7.5-325 mg per tablet Take 1 tablet by mouth every 6 (six) hours as needed for Pain.    methotrexate 2.5 MG Tab 2.5 mg every 7 days.     predniSONE (DELTASONE) 20 MG tablet 20 mg once daily.            Clinical Reference Information           Your Vitals Were     BP Pulse Temp Resp Weight Last Period    154/72 100 99 °F (37.2 °C) (Oral) 20 133.1 kg (293 lb 6 oz) 03/24/2017    SpO2 BMI             99% 47.35 kg/m2         Allergies as of 4/3/2017        Reactions    Shellfish Containing Products Rash    Penicillins       Immunizations Administered on Date of Encounter - 4/3/2017     None      ED Micro, Lab, POCT     None      ED Imaging Orders     None        Discharge Instructions         General Neck and Back Pain    Both neck and back pain are usually caused by injury to the muscles or ligaments of the spine. Sometimes the disks that separate each bone of the spine may cause pain by pressing on a nearby nerve. Back and neck pain may appear after a sudden twisting or bending force (such as in a car accident), or sometimes after a simple awkward movement. In either case, muscle spasm is often present and adds to the pain.  Acute neck and back pain usually gets better in 1 to 2 weeks. Pain related to disk disease, arthritis in the spinal joints or spinal stenosis (narrowing of the spinal canal) can become chronic and last for months or years.  Back and neck pain are common problems. Most people feel better in 1 or 2 weeks, and most of the rest in 1 to 2 months. Most people can remain active.  People experience and describe pain differently.  · Pain can be sharp, stabbing, shooting, aching, cramping, or burning  · Movement, standing, bending, lifting, sitting, or walking may worsen the pain  · Pain can be localized to one spot or area, or it can be more generalized  · Pain can spread or radiate upwards, downwards, to the front, or go down your arms  · Muscle spasm may occur.  Most of the time  mechanical problems with the muscles or spine cause the pain. it is usually caused by an injury, whether known or not, to the muscles or ligaments. While illnesses can cause back pain, it is usually not caused by a serious illness. Pain is usually related to physical activity, whether sports, exercise, work, or normal activity. Sometimes it can occur without an identifiable cause. This can happen simply by stretching or moving wrong, without noting pain at the time. Other causes include:  · Overexertion, lifting, pushing, pulling incorrectly or too aggressively.  · Sudden twisting, bending or stretching from an accident (car or fall), or accidental movement.  · Poor posture  · Poor conditioning, lack of regular exercise  · Spinal disc disease or arthritis  · Stress  · Pregnancy, or illness like appendicitis, bladder or kidney infection, pelvic infections   Home care  · For neck pain: Use a comfortable pillow that supports the head and keeps the spine in a neutral position. The position of the head should not be tilted forward or backward.  · When in bed, try to find a position of comfort. A firm mattress is best. Try lying flat on your back with pillows under your knees. You can also try lying on your side with your knees bent up towards your chest and a pillow between your knees.  · At first, do not try to stretch out the sore spots. If there is a strain, it is not like the good soreness you get after exercising without an injury. In this case, stretching may make it worse.  · Avoid prolonged sitting, long car rides or travel. This puts more stress on the lower back than standing or walking.  · During the first 24 to 72 hours after an injury, apply an ice pack to the painful area for 20 minutes and then remove it for 20 minutes over a period of 60 to 90 minutes or several times a day.   · You can alternate ice and heat therapies. Talk with your healthcare provider about the best treatment for your back or neck  pain. As a safety precaution, do not use a heating pad at bedtime. Sleeping with a heating pad can lead to skin burns or tissue damage.  · Therapeutic massage can help relax the back and neck muscles without stretching them.  · Be aware of safe lifting methods and do not lift anything over 15 pounds until all the pain is gone.  Medications  Talk to your healthcare provider before using medicine, especially if you have other medical problems or are taking other medicines.  · You may use over-the-counter medicine to control pain, unless another pain medicine was prescribed. If you have chronic conditions like diabetes, liver or kidney disease, stomach ulcers,  gastrointestinal bleeding, or are taking blood thinner medicines.  · Be careful if you are given pain medicines, narcotics, or medicine for muscle spasm. They can cause drowsiness, and can affect your coordination, reflexes, and judgment. Do not drive or operate heavy machinery.  Follow-up care  Follow up with your healthcare provider, or as advised. Physical therapy or further tests may be needed.  If X-rays were taken, you will be notified of any new findings that may affect your care.  Call 911  Seek emergency medical care if any of the following occur:  · Trouble breathing  · Confusion  · Very drowsy or trouble awakening  · Fainting or loss of consciousness  · Rapid or very slow heart rate  · Loss of bowel or bladder control  When to seek medical advice  Call your healthcare provider right away if any of these occur:  · Pain becomes worse or spreads into your arms or legs  · Weakness, numbness or pain in one or both arms or legs  · Numbness in the groin area  · Difficulty walking  · Fever of 100.4ºF (38ºC) or higher, or as directed by your healthcare provider  Date Last Reviewed: 7/1/2016  © 1940-7113 [employer]. [address] [place] [address], [place] [address]. All rights reserved. This information is not intended as a substitute for professional  medical care. Always follow your healthcare professional's instructions.          Discharge References/Attachments     HYPERTENSION, ESTABLISHED (ENGLISH)    BACK AND NECK PAIN, GENERAL (ENGLISH)    BACLOFEN TABLETS (ENGLISH)    ACETAMINOPHEN; HYDROCODONE TABLETS OR CAPSULES (ENGLISH)      [facility] Sign-Up     Activating your [facility] account is as easy as 1-2-3!     1) Visit [email or web address], select Sign Up Now, enter this activation code and your date of birth, then select Next.  [email or web address]  Expires: 4/12/2017  3:28 AM      2) Create a username and password to use when you visit [facility] in the future and select a security question in case you lose your password and select Next.    3) Enter your e-mail address and click Sign Up!    Additional Information  If you have questions, please e-mail [email or web address] or call [phone] to talk to our [facility] staff. Remember, [facility] is NOT to be used for urgent needs. For medical emergencies, dial 911.          [facility] complies with applicable Federal civil rights laws and does not discriminate on the basis of race, color, national origin, age, disability, or sex.        Language Assistance Services     ATTENTION: Language assistance services are available, free of charge. Please call [phone].      ATENCIÓN: Si habla jujuañol, tiene a zamora disposición servicios gratuitos de asistencia lingüística. Llame al [phone].     CHÚ Ý: N?u b?n nói Ti?ng Vi?t, có các d?ch v? h? tr? ngôn ng? mi?n phí dành cho b?n. G?i s? [phone].

## 2017-04-03 NOTE — DISCHARGE INSTRUCTIONS
General Neck and Back Pain    Both neck and back pain are usually caused by injury to the muscles or ligaments of the spine. Sometimes the disks that separate each bone of the spine may cause pain by pressing on a nearby nerve. Back and neck pain may appear after a sudden twisting or bending force (such as in a car accident), or sometimes after a simple awkward movement. In either case, muscle spasm is often present and adds to the pain.  Acute neck and back pain usually gets better in 1 to 2 weeks. Pain related to disk disease, arthritis in the spinal joints or spinal stenosis (narrowing of the spinal canal) can become chronic and last for months or years.  Back and neck pain are common problems. Most people feel better in 1 or 2 weeks, and most of the rest in 1 to 2 months. Most people can remain active.  People experience and describe pain differently.  · Pain can be sharp, stabbing, shooting, aching, cramping, or burning  · Movement, standing, bending, lifting, sitting, or walking may worsen the pain  · Pain can be localized to one spot or area, or it can be more generalized  · Pain can spread or radiate upwards, downwards, to the front, or go down your arms  · Muscle spasm may occur.  Most of the time mechanical problems with the muscles or spine cause the pain. it is usually caused by an injury, whether known or not, to the muscles or ligaments. While illnesses can cause back pain, it is usually not caused by a serious illness. Pain is usually related to physical activity, whether sports, exercise, work, or normal activity. Sometimes it can occur without an identifiable cause. This can happen simply by stretching or moving wrong, without noting pain at the time. Other causes include:  · Overexertion, lifting, pushing, pulling incorrectly or too aggressively.  · Sudden twisting, bending or stretching from an accident (car or fall), or accidental movement.  · Poor posture  · Poor conditioning, lack of regular  exercise  · Spinal disc disease or arthritis  · Stress  · Pregnancy, or illness like appendicitis, bladder or kidney infection, pelvic infections   Home care  · For neck pain: Use a comfortable pillow that supports the head and keeps the spine in a neutral position. The position of the head should not be tilted forward or backward.  · When in bed, try to find a position of comfort. A firm mattress is best. Try lying flat on your back with pillows under your knees. You can also try lying on your side with your knees bent up towards your chest and a pillow between your knees.  · At first, do not try to stretch out the sore spots. If there is a strain, it is not like the good soreness you get after exercising without an injury. In this case, stretching may make it worse.  · Avoid prolonged sitting, long car rides or travel. This puts more stress on the lower back than standing or walking.  · During the first 24 to 72 hours after an injury, apply an ice pack to the painful area for 20 minutes and then remove it for 20 minutes over a period of 60 to 90 minutes or several times a day.   · You can alternate ice and heat therapies. Talk with your healthcare provider about the best treatment for your back or neck pain. As a safety precaution, do not use a heating pad at bedtime. Sleeping with a heating pad can lead to skin burns or tissue damage.  · Therapeutic massage can help relax the back and neck muscles without stretching them.  · Be aware of safe lifting methods and do not lift anything over 15 pounds until all the pain is gone.  Medications  Talk to your healthcare provider before using medicine, especially if you have other medical problems or are taking other medicines.  · You may use over-the-counter medicine to control pain, unless another pain medicine was prescribed. If you have chronic conditions like diabetes, liver or kidney disease, stomach ulcers,  gastrointestinal bleeding, or are taking blood thinner  medicines.  · Be careful if you are given pain medicines, narcotics, or medicine for muscle spasm. They can cause drowsiness, and can affect your coordination, reflexes, and judgment. Do not drive or operate heavy machinery.  Follow-up care  Follow up with your healthcare provider, or as advised. Physical therapy or further tests may be needed.  If X-rays were taken, you will be notified of any new findings that may affect your care.  Call 911  Seek emergency medical care if any of the following occur:  · Trouble breathing  · Confusion  · Very drowsy or trouble awakening  · Fainting or loss of consciousness  · Rapid or very slow heart rate  · Loss of bowel or bladder control  When to seek medical advice  Call your healthcare provider right away if any of these occur:  · Pain becomes worse or spreads into your arms or legs  · Weakness, numbness or pain in one or both arms or legs  · Numbness in the groin area  · Difficulty walking  · Fever of 100.4ºF (38ºC) or higher, or as directed by your healthcare provider  Date Last Reviewed: 7/1/2016 © 2000-2016 [employer]. [address] [place] [address], [place] [address]. All rights reserved. This information is not intended as a substitute for professional medical care. Always follow your healthcare professional's instructions.

## 2017-05-02 ENCOUNTER — HOSPITAL ENCOUNTER (EMERGENCY)
Facility: HOSPITAL | Age: 32
Discharge: HOME | End: 2017-05-02
Attending: EMERGENCY MEDICINE
Payer: COMMERCIAL

## 2017-05-02 VITALS
DIASTOLIC BLOOD PRESSURE: 79 MMHG | SYSTOLIC BLOOD PRESSURE: 139 MMHG | HEART RATE: 83 BPM | BODY MASS INDEX: 47.78 KG/M2 | TEMPERATURE: 98 F | OXYGEN SATURATION: 100 % | WEIGHT: 293 LBS | RESPIRATION RATE: 18 BRPM

## 2017-05-02 DIAGNOSIS — L40.9 PSORIASIS: Primary | ICD-10-CM

## 2017-05-02 PROBLEM — R76.0 RAISED ANTIBODY TITER: Status: ACTIVE | Noted: 2017-03-18

## 2017-05-02 PROBLEM — I63.9 CEREBROVASCULAR ACCIDENT (CVA): Status: ACTIVE | Noted: 2017-03-18

## 2017-05-02 PROCEDURE — 99283 EMERGENCY DEPT VISIT LOW MDM: CPT | Mod: 25

## 2017-05-02 PROCEDURE — 63600175 PHARM REV CODE 636 W HCPCS: Performed by: NURSE PRACTITIONER

## 2017-05-02 PROCEDURE — 96372 THER/PROPH/DIAG INJ SC/IM: CPT

## 2017-05-02 RX ORDER — LAMOTRIGINE 200 MG/1
200 TABLET ORAL NIGHTLY
COMMUNITY
End: 2020-01-03

## 2017-05-02 RX ORDER — BETAMETHASONE SODIUM PHOSPHATE AND BETAMETHASONE ACETATE 3; 3 MG/ML; MG/ML
6 INJECTION, SUSPENSION INTRA-ARTICULAR; INTRALESIONAL; INTRAMUSCULAR; SOFT TISSUE
Status: COMPLETED | OUTPATIENT
Start: 2017-05-02 | End: 2017-05-02

## 2017-05-02 RX ORDER — HYDROCHLOROTHIAZIDE 12.5 MG/1
12.5 CAPSULE ORAL EVERY MORNING
COMMUNITY
End: 2017-07-20

## 2017-05-02 RX ORDER — HYDROXYZINE PAMOATE 50 MG/1
50 CAPSULE ORAL EVERY 6 HOURS PRN
Qty: 20 CAPSULE | Refills: 0 | Status: SHIPPED | OUTPATIENT
Start: 2017-05-02 | End: 2017-10-09 | Stop reason: SDUPTHER

## 2017-05-02 RX ORDER — WARFARIN 6 MG/1
6 TABLET ORAL DAILY
COMMUNITY
End: 2018-07-23 | Stop reason: RX

## 2017-05-02 RX ORDER — PREDNISONE 20 MG/1
TABLET ORAL
Qty: 18 TABLET | Refills: 0 | Status: SHIPPED | OUTPATIENT
Start: 2017-05-02 | End: 2017-08-10

## 2017-05-02 RX ADMIN — BETAMETHASONE SODIUM PHOSPHATE AND BETAMETHASONE ACETATE 6 MG: 3; 3 INJECTION, SUSPENSION INTRA-ARTICULAR; INTRALESIONAL; INTRAMUSCULAR at 09:05

## 2017-05-02 NOTE — ED AVS SNAPSHOT
[facility]  [address]   [place] [address]               Denise Barron   2017  8:31 PM   ED    Description:  Female : 1985   Department:  [facility]           Your Care was Coordinated By:     Provider Role From To    Ankur Rodrigues NP Nurse Practitioner 17 --      Reason for Visit     Skin Problem           Diagnoses this Visit        Comments    Psoriasis    -  Primary       ED Disposition     ED Disposition Condition Comment    Discharge             To Do List           Follow-up Information     Call Kenji Evans MD.    Specialty:  Family Medicine    Why:  If symptoms worsen or as needed    Contact information:    [address] [facility] [address] [facility]  [place] [address]  [phone]          Call Dermatologist.    Why:  If symptoms worsen or as needed       These Medications        Disp Refills Start End    predniSONE (DELTASONE) 20 MG tablet 18 tablet 0 2017     Take 1 tablet 3 times per day for 3 days, then  Take 1 tablet 2 times per day for 3 days, then   Take 1 tablet daily for 3 days    hydrOXYzine pamoate (VISTARIL) 50 MG Cap 20 capsule 0 2017     Take 1 capsule (50 mg total) by mouth every 6 (six) hours as needed (Itching). - Oral      [facility]     [facility] -  Assistance  Unless otherwise directed by your provider, please contact [facility], our nurse care line that is available for  assistance.     Registered nurses in the [facility] provide: appointment scheduling, clinical advisement, health education, and other advisory services.  Call: [phone] (toll free)               Medications           Message regarding Medications     Verify the changes and/or additions to your medication regime listed below are the same as discussed with your clinician today.  If any of these changes or additions are incorrect, please notify your  healthcare provider.        START taking these NEW medications        Refills    predniSONE (DELTASONE) 20 MG tablet 0    Sig: Take 1 tablet 3 times per day for 3 days, then  Take 1 tablet 2 times per day for 3 days, then   Take 1 tablet daily for 3 days    Class: Print    hydrOXYzine pamoate (VISTARIL) 50 MG Cap 0    Sig: Take 1 capsule (50 mg total) by mouth every 6 (six) hours as needed (Itching).    Class: Print    Route: Oral      These medications were administered today        Dose Freq    betamethasone acetate-betamethasone sodium phosphate injection 6 mg 6 mg ED 1 Time    Sig: Inject 1 mL (6 mg total) into the muscle ED 1 Time.    Class: Normal    Route: Intramuscular      STOP taking these medications     baclofen (LIORESAL) 20 MG tablet Take 1 tablet (20 mg total) by mouth 3 (three) times daily as needed (musle spasm).    hydrocodone-acetaminophen 7.5-325mg (NORCO) 7.5-325 mg per tablet Take 1 tablet by mouth every 6 (six) hours as needed for Pain.    methotrexate 2.5 MG Tab 2.5 mg every 7 days.     lamotrigine (LAMICTAL XR) 250 mg TR24 Take 250 mg by mouth once daily.           Verify that the below list of medications is an accurate representation of the medications you are currently taking.  If none reported, the list may be blank. If incorrect, please contact your healthcare provider. Carry this list with you in case of emergency.           Current Medications     amlodipine (NORVASC) 5 MG tablet Take 5 mg by mouth once daily.    cholecalciferol, vitamin D3, 5,000 unit capsule 5,000 Units once daily.     fluticasone (FLONASE) 50 mcg/actuation nasal spray 2 sprays by Nasal route once daily at 6am.     hydrochlorothiazide (MICROZIDE) 12.5 mg capsule Take 12.5 mg by mouth every morning.    lamotrigine (LAMICTAL) 200 MG tablet Take 200 mg by mouth every evening.    losartan (COZAAR) 50 MG tablet Take 50 mg by mouth once daily.    warfarin (COUMADIN) 6 MG tablet Take 6 mg by mouth Daily.    betamethasone  acetate-betamethasone sodium phosphate injection 6 mg Inject 1 mL (6 mg total) into the muscle ED 1 Time.    hydrOXYzine pamoate (VISTARIL) 50 MG Cap Take 1 capsule (50 mg total) by mouth every 6 (six) hours as needed (Itching).    predniSONE (DELTASONE) 20 MG tablet Take 1 tablet 3 times per day for 3 days, then  Take 1 tablet 2 times per day for 3 days, then   Take 1 tablet daily for 3 days           Clinical Reference Information           Your Vitals Were     BP Pulse Temp Resp Weight Last Period    119/73 99 98.7 °F (37.1 °C) (Oral) 20 134.3 kg (296 lb) 05/01/2017 (Exact Date)    SpO2 BMI             100% 47.78 kg/m2         Allergies as of 5/2/2017        Reactions    Shellfish Containing Products Rash    Penicillins       Immunizations Administered on Date of Encounter - 5/2/2017     None      ED Micro, Lab, POCT     None      ED Imaging Orders     None        Discharge Instructions         Managing Psoriasis     Take baths in warm water to help soften scales.   The success of your medical treatment depends on you. When your healthcare provider gives you a treatment plan, ask when you should expect to see results. Then, follow your plan. If your treatment does not work in the expected time, let your healthcare provider know. Psoriasis is a common disease, and it can respond to many different treatments. It depends on the location, size, and symptoms each person experiences. Some treatments are simple (tar-based therapies or topical steroids), and some are complex (new biologic medications or light therapy). Your healthcare provider will need to personalize your treatment. Psoriasis frequently will get better with treatment, but later worsen if you stop treatment or if a new illness ocurs. In most cases, you can get control of your psoriasis again. You will likely need to see your healthcare provider regularly about treatment options.  Psoriasis self-care  Follow these steps to help manage your  symptoms:  · Take baths to help soften scales. Use warm, not hot, water. To avoid drying out your skin, limit each bath to about 15 minutes. Add bath oil or [place] salts.  · After you bathe, apply lotion right away, while your skin is damp. Dry skin can make symptoms worse.  · Use a scalp treatment as prescribed by your health care provider. There are different solutions and dosages based on your symptoms.   · Promptly seek treatment for any skin injuries because they can cause flare-ups.  · Manage your stress, and use relaxation techniques.  · Expose your psoriatic skin to sunlight for 5 minutes a day, except if you feel that sun exposure makes your psoriasis worse. Use sunscreen on the normal, unaffected skin, but avoid sunburns.  · Use over-the-counter hydrocortisone cream for itching to reduce scaling for active outbreaks. Consult with your healthcare provider for long-term use.  · Stick with treatment that your healthcare provider has recommended for you, especially if it's controlling your psoriasis.  · Avoid abrasive cleansers, harsh detergents, and household chemicals.  Getting good results  Now that you know more about psoriasis, the next step is up to you. Follow your healthcare provider's treatment plan and self-care routine. Doing so can help you control your symptoms. If your symptoms dont improve or they get worse, call your healthcare provider. Psoriasis cant be cured. But its symptoms can be managed.   Date Last Reviewed: 5/10/2015  © 3575-5698 [employer]. [address] [place] [address], [place] [address]. All rights reserved. This information is not intended as a substitute for professional medical care. Always follow your healthcare professional's instructions.          Psoriasis  Psoriasis is an inflammatory condition that affects the skin and nails. You may have patches of thick, red skin (plaques) covered with silvery scales. These often appear on the elbows, knees, legs, lower back,  and scalp.  The plaques itch and can be painful. People with this condition are more likely to have emotional stress and depression.  Psoriasis is not contagious. It cant spread to someone else who touches it. But it can be inherited. It is an autoimmune skin disease. This means that the immune system has an abnormal reaction. It treats healthy skin like it is a foreign substance. This causes skin cells to grow faster than normal and to stack up in raised red patches. Psoriasis is a long-term (chronic) disease. You will have flare-ups that come and go over time.  Smoking, sun exposure, and alcohol use may affect how often the psoriasis occurs and how long the flare-ups last.  There is no cure, but treatments can offer relief. Treatment can include topical creams, light therapy (phototherapy), and oral or injectable medicines.  Home care  · No specific diet is needed. Eat a healthy, well-balanced diet that includes fresh fruits and vegetables, whole grains, and lean meats. Psoriasis can increase your risk for diabetes and heart disease.  · Increasing omega-3 fatty acids in your diet can help improve dry skin. The best dietary sources are fatty fish (salmon, mackerel, lake trout, albacore tuna) or fish oil (such as cod liver oil). A great way to take fish oil is to add it to a juice, shake, or smoothie. Flaxseeds and flaxseed oil, canola oil, walnuts, soybean, and tofu are converted to omega-3 fatty acid in the body.  · Stay at a healthy weight. Overlapping skin folds can be a site for psoriasis plaques. If you are overweight, talk to your healthcare provider about a weight-loss program.  · Bathing daily can help remove scales and calm inflamed skin. Use lukewarm water and mild soaps that have added oils, fats, and moisturizers. Avoid deodorants, antiperspirants, and antibacterial soaps. These have a drying effect. Many people find it helpful to soak in a tub with added bath oils, oatmeal, apple cider vinegar, or  Epsom salts.  · After bathing, put on skin cream (or a skin oil for a stronger effect).  · Some exposure to UV rays from the sun can improve psoriasis. But too much sun can trigger an outbreak. It also raises your risk for skin cancer. Limit sun exposure and use sunscreen on healthy skin (at least 15 SPF).  · If you are prescribed medicine, take it as directed.  · Unless another steroid cream was prescribed, you may use over-the-counter hydrocortisone cream for a few weeks during symptom flare-ups.  · Stop smoking. If you are a long-time smoker, this can be hard. Think about joining a stop-smoking program.  · Tell your provider if your joints start to ache or get stiff.  · Tell your provider if you notice changes in your fingernails.  · Depression is more common among psoriasis patients. Get help if you notice changes in your mood.  Follow-up care  Follow up with your healthcare provider, or as advised.  When to seek medical advice  Call your healthcare provider right away if any of these occur:  · Skin pain gets worse  · Bleeding from the skin plaques that is hard to control  · Signs of skin infection (redness, increasing pain, swelling, pus)  · Fever of 1 degree, or higher, above your normal temperature, or as directed by your provider  Date Last Reviewed: 8/1/2016 © 2000-2016 [employer]. [address] [place] [address]. All rights reserved. This information is not intended as a substitute for professional medical care. Always follow your healthcare professional's instructions.          [facility] Sign-Up     Activating your [facility] account is as easy as 1-2-3!     1) Visit [email or web address], select Sign Up Now, enter this activation code and your date of birth, then select Next.  [email or web address]  Expires: 6/16/2017  8:48 PM      2) Create a username and password to use when you visit [facility] in the future and select a security question in case you lose your password and select  Next.    3) Enter your e-mail address and click Sign Up!    Additional Information  If you have questions, please e-mail [email or web address] or call [phone] to talk to our [facility] staff. Remember, [facility] is NOT to be used for urgent needs. For medical emergencies, dial 911.          [facility] complies with applicable Federal civil rights laws and does not discriminate on the basis of race, color, national origin, age, disability, or sex.        Language Assistance Services     ATTENTION: Language assistance services are available, free of charge. Please call [phone].      ATENCIÓN: Si habla español, tiene a zamora disposición servicios gratuitos de asistencia lingüística. Llame al [phone].     CHÚ Ý: N?u b?n nói Ti?ng Vi?t, có các d?ch v? h? tr? ngôn ng? mi?n phí dành cho b?n. G?i s? [phone].

## 2017-05-03 NOTE — ED PROVIDER NOTES
"Encounter Date: 5/2/2017       History     Chief Complaint   Patient presents with    Skin Problem     Pt states " I have psoriasis and I started breaking out with these bumps and I'm itching real bad".     Review of patient's allergies indicates:   Allergen Reactions    Shellfish containing products Rash    Penicillins      Patient is a 32 y.o. female presenting with the following complaint: rash. The history is provided by the patient.   Rash    This is a chronic problem. The current episode started several days ago. The problem has been unchanged. The problem is associated with an unknown (patient states that she does has a history of psoriasis) factor. Affected Location: generalized. The pain is at a severity of 5/10. The pain has been constant since onset. Associated symptoms include itching. She has tried nothing for the symptoms. Risk factors: history of psoriasis.         PCP:   Kenji Evans MD        Past Medical History:   Diagnosis Date    Hypertension     Psoriasis     Seizures     Stroke      Past Surgical History:   Procedure Laterality Date    APPENDECTOMY      right toe amputation        Family History   Problem Relation Age of Onset    Cancer Neg Hx      Social History   Substance Use Topics    Smoking status: Never Smoker    Smokeless tobacco: Never Used    Alcohol use No     Review of Systems   Constitutional: Negative for fever.   HENT: Negative for sore throat.    Respiratory: Negative for shortness of breath.    Cardiovascular: Negative for chest pain.   Gastrointestinal: Negative for nausea.   Genitourinary: Negative for dysuria.   Musculoskeletal: Negative for back pain.   Skin: Positive for itching and rash.   Neurological: Negative for weakness.   Hematological: Does not bruise/bleed easily.       Physical Exam   Initial Vitals   BP Pulse Resp Temp SpO2   05/02/17 2005 05/02/17 2005 05/02/17 2005 05/02/17 2005 05/02/17 2005   119/73 99 20 98.7 °F (37.1 °C) 100 %     Physical " Exam    Nursing note and vitals reviewed.  Constitutional: Vital signs are normal. She appears well-developed and well-nourished. She is Obese . She is cooperative. She does not appear ill. No distress.   HENT:   Head: Normocephalic and atraumatic.   Mouth/Throat: Uvula is midline and oropharynx is clear and moist.   Eyes: Conjunctivae, EOM and lids are normal. Pupils are equal, round, and reactive to light.   Neck: Trachea normal and normal range of motion. Neck supple.   Cardiovascular: Normal rate, regular rhythm, intact distal pulses and normal pulses.   Pulmonary/Chest: Effort normal and breath sounds normal. No respiratory distress. She has no wheezes. She has no rhonchi. She has no rales.   Abdominal: Soft. She exhibits no distension and no mass. There is no tenderness. There is no rebound and no guarding.   Musculoskeletal: Normal range of motion. She exhibits no edema or tenderness.   Neurological: She is alert and oriented to person, place, and time. She has normal strength. No cranial nerve deficit or sensory deficit. GCS eye subscore is 4. GCS verbal subscore is 5. GCS motor subscore is 6.   Neurovascular intact to all extremities. Normal gait.    Skin: Skin is warm and dry. Rash noted. Rash is maculopapular (scaling erythematous macules, papules, and plaques noted diffusely but more prominent to extensor surfaces of the knees, elbows, scalp, trunk and retroauricular region).   Psychiatric: She has a normal mood and affect. Her speech is normal and behavior is normal. Judgment and thought content normal. Cognition and memory are normal.         ED Course   Procedures    ED Medications:   Medications   betamethasone acetate-betamethasone sodium phosphate injection 6 mg (6 mg Intramuscular Given 5/2/17 2102)       ED Course Vitals  Vitals:    05/02/17 2005 05/02/17 2128   BP: 119/73 139/79   BP Location:  Left arm   Patient Position:  Sitting   BP Method:  Automatic   Pulse: 99 83   Resp: 20 18   Temp:  98.7 °F (37.1 °C) 98.3 °F (36.8 °C)   TempSrc: Oral Oral   SpO2: 100% 100%   Weight: 134.3 kg (296 lb)          2125 HOURS RE-EVALUATION & DISPOSITION:   Reassessment at the time of disposition demonstrates that the patient is resting comfortably in no acute distress.  She has remained hemodynamically stable throughout the entire ED visit and is without objective evidence for acute process requiring urgent intervention or hospitalization. I provided counseling to patient with regard to condition, the treatment plan, specific conditions for return, and the importance of follow up.  Answered questions at this time. The patient is stable for discharge.                 Medical Decision Making:   History:   Old Records Summarized: records from clinic visits.                     Clinical Impression:       ICD-10-CM ICD-9-CM   1. Psoriasis L40.9 696.1             Disposition:   Disposition: Discharged  Condition: Stable  I discussed with patient that the evaluation in the emergency department does not suggest any emergent or life threatening medical condition requiring immediate intervention beyond what was provided in the ED, and I believe patient is safe for discharge.  Regardless, an unremarkable evaluation in the ED does not preclude the development or presence of a serious of life threatening condition. As such, patient was instructed to return immediately for any worsening or change in current symptoms. I also discussed the results of my evaluation and diagnosis with patient and she concurs with the evaluation and management plan.  Detailed written and verbal instructions provided to patient and she expressed a verbal understanding of the discharge instructions and overall management plan. Reiterated the importance of medication administration and safety and advised patient to follow up with primary care provider in 3-5 days or sooner if needed.  Also advised patient to return to the ER for any complications.          Discharge Medication List as of 5/2/2017  8:57 PM      START taking these medications    Details   hydrOXYzine pamoate (VISTARIL) 50 MG Cap Take 1 capsule (50 mg total) by mouth every 6 (six) hours as needed (Itching)., Starting 5/2/2017, Until Discontinued, Print      predniSONE (DELTASONE) 20 MG tablet Take 1 tablet 3 times per day for 3 days, then  Take 1 tablet 2 times per day for 3 days, then   Take 1 tablet daily for 3 days, Print               Follow-up Information     Call Kenji Evans MD.    Specialty:  Family Medicine    Why:  If symptoms worsen or as needed    Contact information:    [address] [facility] [id number] [phone]          Call Dermatologist.    Why:  If symptoms worsen or as needed             Ankur Rodrigues NP  05/02/17 3184

## 2017-05-03 NOTE — ED NOTES
Pt states no further needs/concerns. resp even, unlabored. No distress noted. Pt AAOx3. Will d/c per order.

## 2017-05-03 NOTE — DISCHARGE INSTRUCTIONS
Managing Psoriasis     Take baths in warm water to help soften scales.   The success of your medical treatment depends on you. When your healthcare provider gives you a treatment plan, ask when you should expect to see results. Then, follow your plan. If your treatment does not work in the expected time, let your healthcare provider know. Psoriasis is a common disease, and it can respond to many different treatments. It depends on the location, size, and symptoms each person experiences. Some treatments are simple (tar-based therapies or topical steroids), and some are complex (new biologic medications or light therapy). Your healthcare provider will need to personalize your treatment. Psoriasis frequently will get better with treatment, but later worsen if you stop treatment or if a new illness ocurs. In most cases, you can get control of your psoriasis again. You will likely need to see your healthcare provider regularly about treatment options.  Psoriasis self-care  Follow these steps to help manage your symptoms:  · Take baths to help soften scales. Use warm, not hot, water. To avoid drying out your skin, limit each bath to about 15 minutes. Add bath oil or [place] salts.  · After you bathe, apply lotion right away, while your skin is damp. Dry skin can make symptoms worse.  · Use a scalp treatment as prescribed by your health care provider. There are different solutions and dosages based on your symptoms.   · Promptly seek treatment for any skin injuries because they can cause flare-ups.  · Manage your stress, and use relaxation techniques.  · Expose your psoriatic skin to sunlight for 5 minutes a day, except if you feel that sun exposure makes your psoriasis worse. Use sunscreen on the normal, unaffected skin, but avoid sunburns.  · Use over-the-counter hydrocortisone cream for itching to reduce scaling for active outbreaks. Consult with your healthcare provider for long-term use.  · Stick with treatment  that your healthcare provider has recommended for you, especially if it's controlling your psoriasis.  · Avoid abrasive cleansers, harsh detergents, and household chemicals.  Getting good results  Now that you know more about psoriasis, the next step is up to you. Follow your healthcare provider's treatment plan and self-care routine. Doing so can help you control your symptoms. If your symptoms dont improve or they get worse, call your healthcare provider. Psoriasis cant be cured. But its symptoms can be managed.   Date Last Reviewed: 5/10/2015  © 1159-7666 [employer]. [address] [place] [address]. All rights reserved. This information is not intended as a substitute for professional medical care. Always follow your healthcare professional's instructions.          Psoriasis  Psoriasis is an inflammatory condition that affects the skin and nails. You may have patches of thick, red skin (plaques) covered with silvery scales. These often appear on the elbows, knees, legs, lower back, and scalp.  The plaques itch and can be painful. People with this condition are more likely to have emotional stress and depression.  Psoriasis is not contagious. It cant spread to someone else who touches it. But it can be inherited. It is an autoimmune skin disease. This means that the immune system has an abnormal reaction. It treats healthy skin like it is a foreign substance. This causes skin cells to grow faster than normal and to stack up in raised red patches. Psoriasis is a long-term (chronic) disease. You will have flare-ups that come and go over time.  Smoking, sun exposure, and alcohol use may affect how often the psoriasis occurs and how long the flare-ups last.  There is no cure, but treatments can offer relief. Treatment can include topical creams, light therapy (phototherapy), and oral or injectable medicines.  Home care  · No specific diet is needed. Eat a healthy, well-balanced diet that  includes fresh fruits and vegetables, whole grains, and lean meats. Psoriasis can increase your risk for diabetes and heart disease.  · Increasing omega-3 fatty acids in your diet can help improve dry skin. The best dietary sources are fatty fish (salmon, mackerel, lake trout, albacore tuna) or fish oil (such as cod liver oil). A great way to take fish oil is to add it to a juice, shake, or smoothie. Flaxseeds and flaxseed oil, canola oil, walnuts, soybean, and tofu are converted to omega-3 fatty acid in the body.  · Stay at a healthy weight. Overlapping skin folds can be a site for psoriasis plaques. If you are overweight, talk to your healthcare provider about a weight-loss program.  · Bathing daily can help remove scales and calm inflamed skin. Use lukewarm water and mild soaps that have added oils, fats, and moisturizers. Avoid deodorants, antiperspirants, and antibacterial soaps. These have a drying effect. Many people find it helpful to soak in a tub with added bath oils, oatmeal, apple cider vinegar, or Epsom salts.  · After bathing, put on skin cream (or a skin oil for a stronger effect).  · Some exposure to UV rays from the sun can improve psoriasis. But too much sun can trigger an outbreak. It also raises your risk for skin cancer. Limit sun exposure and use sunscreen on healthy skin (at least 15 SPF).  · If you are prescribed medicine, take it as directed.  · Unless another steroid cream was prescribed, you may use over-the-counter hydrocortisone cream for a few weeks during symptom flare-ups.  · Stop smoking. If you are a long-time smoker, this can be hard. Think about joining a stop-smoking program.  · Tell your provider if your joints start to ache or get stiff.  · Tell your provider if you notice changes in your fingernails.  · Depression is more common among psoriasis patients. Get help if you notice changes in your mood.  Follow-up care  Follow up with your healthcare provider, or as advised.  When  to seek medical advice  Call your healthcare provider right away if any of these occur:  · Skin pain gets worse  · Bleeding from the skin plaques that is hard to control  · Signs of skin infection (redness, increasing pain, swelling, pus)  · Fever of 1 degree, or higher, above your normal temperature, or as directed by your provider  Date Last Reviewed: 8/1/2016 © 2000-2016 [employer]. [address] [place] [address], [place] [address]. All rights reserved. This information is not intended as a substitute for professional medical care. Always follow your healthcare professional's instructions.

## 2017-05-19 ENCOUNTER — HOSPITAL ENCOUNTER (EMERGENCY)
Facility: HOSPITAL | Age: 32
Discharge: HOME | End: 2017-05-19
Payer: COMMERCIAL

## 2017-05-19 VITALS
RESPIRATION RATE: 20 BRPM | OXYGEN SATURATION: 97 % | HEIGHT: 66 IN | TEMPERATURE: 99 F | BODY MASS INDEX: 45 KG/M2 | WEIGHT: 280 LBS | HEART RATE: 101 BPM | DIASTOLIC BLOOD PRESSURE: 76 MMHG | SYSTOLIC BLOOD PRESSURE: 139 MMHG

## 2017-05-19 DIAGNOSIS — S93.401A SPRAIN OF RIGHT ANKLE, UNSPECIFIED LIGAMENT, INITIAL ENCOUNTER: Primary | ICD-10-CM

## 2017-05-19 PROCEDURE — 99283 EMERGENCY DEPT VISIT LOW MDM: CPT

## 2017-05-19 PROCEDURE — 25000003 PHARM REV CODE 250: Performed by: PHYSICIAN ASSISTANT

## 2017-05-19 RX ORDER — KETOROLAC TROMETHAMINE 10 MG/1
10 TABLET, FILM COATED ORAL
Status: COMPLETED | OUTPATIENT
Start: 2017-05-19 | End: 2017-05-19

## 2017-05-19 RX ADMIN — KETOROLAC TROMETHAMINE 10 MG: 10 TABLET, FILM COATED ORAL at 02:05

## 2017-05-19 NOTE — ED PROVIDER NOTES
"Encounter Date: 5/19/2017       History     Chief Complaint   Patient presents with    Ankle Pain     right. "i fell in a hole at the [employer] and my ankle been bothering me"     Review of patient's allergies indicates:   Allergen Reactions    Shellfish containing products Rash    Penicillins      HPI Comments: The patient presents to the ER for an emergent evaluation due to acute pain and swelling to the lateral aspect of her right ankle. She states that she was attending a graduation this morning, and stepped on uneven turf unexpectedly, causing her to twist her ankle. She states that the degree of pain is mild at rest, and moderate when walking and bearing weight. She denies any additional injuries, or concerns. She denies any possibility of pregnancy. She denies breast feeding. Pre-arrival treatment: none.      The history is provided by the patient.     Past Medical History:   Diagnosis Date    Hypertension     Obesity     Psoriasis     Seizures     Stroke      Past Surgical History:   Procedure Laterality Date    APPENDECTOMY      TOE AMPUTATION      Right great toe amputated due to infection from "hangnail"      Family History   Problem Relation Age of Onset    Cancer Neg Hx      Social History   Substance Use Topics    Smoking status: Never Smoker    Smokeless tobacco: Never Used    Alcohol use No     Review of Systems   Constitutional: Negative for diaphoresis.   Respiratory: Negative for chest tightness and shortness of breath.    Cardiovascular: Negative for chest pain.   Gastrointestinal: Negative for abdominal pain, nausea and vomiting.   Genitourinary: Negative for menstrual problem.   Musculoskeletal: Positive for arthralgias, gait problem and joint swelling. Negative for back pain and neck pain.   Skin: Negative for color change and wound.   Neurological: Negative for dizziness, syncope, weakness, light-headedness, numbness and headaches.   Psychiatric/Behavioral: Negative for confusion. "       Physical Exam   Initial Vitals   BP Pulse Resp Temp SpO2   05/19/17 1423 05/19/17 1423 05/19/17 1423 05/19/17 1423 05/19/17 1423   139/76 101 20 98.8 °F (37.1 °C) 97 %     Physical Exam    Nursing note and vitals reviewed.  Constitutional: She appears well-developed and well-nourished.   HENT:   Head: Atraumatic.   Eyes: Conjunctivae are normal.   Cardiovascular: Normal rate and intact distal pulses.   Pulmonary/Chest: Breath sounds normal. No respiratory distress.   Abdominal: Soft. There is no tenderness.   Musculoskeletal:        Feet:    There is mild to moderate localized swelling and tenderness to palpation over the lateral malleolus of the right ankle; no deformity; slightly decreased ROM due to pain; NV intact; skin intact; no erythema or ecchymosis present. No hemarthrosis. She is s/p amputation of the right great toe, incision well healed.    Neurological: She is alert and oriented to person, place, and time. She has normal strength. No cranial nerve deficit or sensory deficit.   Skin: Skin is warm and dry.   Psychiatric: She has a normal mood and affect. Her behavior is normal.         ED Course   Orthopedic Injury  Date/Time: 5/19/2017 3:02 PM  Authorized by: MEJIA PUGA   Performed by: TERESO RUSSELL  Injury location: ankle  Location details: right ankle  Injury type: soft tissue  Pre-procedure distal perfusion: normal  Pre-procedure neurological function: normal  Pre-procedure neurovascular assessment: neurovascularly intact  Pre-procedure range of motion: reduced  Immobilization: splint and crutches  Splint type: ACE wrap   Supplies used: elastic bandage  Complications: No  Post-procedure neurovascular assessment: post-procedure neurovascularly intact  Post-procedure distal perfusion: normal  Post-procedure neurological function: normal  Patient tolerance: Patient tolerated the procedure well with no immediate complications        Labs Reviewed - No data to display     Imaging  Results         X-Ray Ankle Complete Right (Final result) Result time:  05/19/17 14:51:11    Final result by Wyatt Whitman MD (05/19/17 14:51:11)    Impression:         Lateral soft tissue swelling but no fracture or dislocation.        Electronically signed by: WYATT WHITMAN MD  Date:     05/19/17  Time:    14:51     Narrative:    Exam: XR ANKLE COMPLETE 3 VIEW RIGHT    Clinical History:    Pain right ankle.    Findings:      There is lateral soft tissue swelling of the right ankle.No right ankle fracture or dislocation.                 Medical Decision Making:   Initial Assessment:   Acute minor injury causing pain and swelling to lateral right ankle.   Differential Diagnosis:   Fracture, avulsion, sprain, dislocation, gout, etc.   Clinical Tests:   Radiological Study: Ordered and Reviewed  ED Management:  X ray unremarkable   ACE wrap applied   Analgesic given in the ER   Crutches provided   Patient advised to take Tylenol as directed as needed for pain, up to 2 gm/day   Patient advised to return to the ER if worse in any way     Additional MDM:   X-Rays: I have independently interpreted X-Ray(s) - see notes.                 ED Course     Clinical Impression:   The encounter diagnosis was Sprain of right ankle, unspecified ligament, initial encounter.    Disposition:   Disposition: Discharged  Condition: Stable       Shiv Flynn PA-C  05/19/17 [id number]

## 2017-05-19 NOTE — DISCHARGE INSTRUCTIONS
ACE Wrap  Minor muscle or joint injuries are often treated with an elastic bandage. The bandage provides support and compression to the injured area. An elastic bandage is a stretchy, rolled bandage. Elastic bandages range in width from 2 to 6 inches. They can be used for a variety of injuries. The bandages are often called ACE bandages, after the most common brand name.  If used correctly, elastic bandages help control swelling and ease pain. An elastic bandage is also a good reminder not to overuse the injured area. However, elastic bandages do not provide a lot of support and will not prevent reinjury.  Home care    To apply an elastic bandage:  · Check the skin before wrapping the injury. It should be clean, dry, and free of drainage.  · Start wrapping below the injury and work your way toward the body. For an ankle sprain, start wrapping around the foot and work up toward the calf. This will help control swelling.  · Overlap the edges of the bandage so it stays snuggly in place.  · Wrap the bandage firmly, but not too tightly. A tight bandage can increase swelling on either end of the bandage. Make sure the bandage is wrinkle free.  · Leave fingers and toes exposed.  · Secure ends of the bandage (even self-sticking ones) with clips or tape.  · Check frequently to ensure adequate circulation, especially in the fingers and toes. Loosen the bandage if there is local swelling, numbness, tingling, discomfort, coldness, or discoloration (skin pale or bluish in color).  · Rewrap the bandage as needed during the day. Reroll the bandage as you unwind it.  Continue using the elastic bandage until the pain and swelling are gone or as your healthcare provider advises.  If you have been told to ice the area, the ice can be secured in place with the elastic bandage. Wrap the ice pack with a thin towel to protect the skin. Do not put ice or an ice pack directly on the skin.  Ice the area for no more than 20 minutes at a  time.    Follow-up care  Follow up with your healthcare provider, as advised.  When to seek medical advice  Call your healthcare provider for any of the following:  · Pain and swelling that doesn't get better or gets worse  · Trouble moving injured area  · Skin discoloration, numbness, or tingling that doesnt go away after bandage is removed  Date Last Reviewed: 9/13/2015  © 2147-5314 [employer]. [address] [place] [address], [place] [address]. All rights reserved. This information is not intended as a substitute for professional medical care. Always follow your healthcare professional's instructions.          Treating Ankle Sprains  Treatment will depend on how bad your sprain is. For a severe sprain, healing may take 3 months or more.  Right after your injury: Use R.I.C.E.  · Rest: At first, keep weight off the ankle as much as you can. You may be given crutches to help you walk without putting weight on the ankle.  · Ice: Put an ice pack on the ankle for 15 minutes. Remove the pack and wait at least 30 minutes. Repeat for up to 3 days. This helps reduce swelling.  · Compression: To reduce swelling and keep the joint stable, you may need to wrap the ankle with an elastic bandage. For more severe sprains, you may need an ankle brace or a cast.  · Elevation: To reduce swelling, keep your ankle raised above your heart when you sit or lie down.  Medicine  Your healthcare provider may suggest oral non-steroidal anti-inflammatory medicine (NSAIDs), such as ibuprofen. This relieves the pain and helps reduce any swelling. Be sure to take your medicine as directed.  Contrast baths  After 3 days, soak your ankle in warm water for 30 seconds, then in cool water for 30 seconds. Go back and forth for 5 minutes. Doing this every 2 hours will help keep the swelling down.  Exercises    After about 2 to 3 weeks, you may be given exercises to strengthen the ligaments and muscles in the ankle. Doing these exercises will  help prevent another ankle sprain. Exercises may include standing on your toes and then on your heels and doing ankle curls.  Ankle curls  · Sit on the edge of a sturdy table or lie on your back.  · Pull your toes toward you. Then point them away from you. Repeat for 2 to 3 minutes.   Date Last Reviewed: 9/28/2015  © 7896-2572 [employer]. [address] [place] [address], [place] [address]. All rights reserved. This information is not intended as a substitute for professional medical care. Always follow your healthcare professional's instructions.          Understanding Ankle Sprain    The ankle is the joint where the leg and foot meet. Bones are held in place by connective tissue called ligaments. When ankle ligaments are stretched to the point of pain and injury, it is called an ankle sprain. A sprain can tear the ligaments. These tears can be very small but still cause pain. Ankle sprains can be mild or severe.  What causes an ankle sprain?  A sprain may occur when you twist your ankle or bend it too far. This can happen when you stumble or fall. Things that can make an ankle sprain more likely include:  · Having had an ankle sprain before  · Playing sports that involve running and jumping. Or playing contact sports such as football or hockey.  · Wearing shoes that dont support your feet and ankles well  · Having ankles with poor strength and flexibility  Symptoms of an ankle sprain  Symptoms may include:  · Pain or soreness in the ankle  · Swelling  · Redness or bruising  · Not being able to walk or put weight on the affected foot  · Reduced range of motion in the ankle  · A popping or tearing feeling at the time the sprain occurs  · An abnormal or dislocated look to the ankle  · Instability or too much range of motion in the ankle  Treatment for an ankle sprain  Treatment focuses on reducing pain and swelling, and avoiding further injury. Treatments may include:  · Resting the ankle. Avoid putting weight  on it. This may mean using crutches until the sprain heals.  · Prescription or over-the-counter pain medicines. These help reduce swelling and pain.  · Cold packs. These help reduce pain and swelling.  · Raising your ankle above your heart. This helps reduce swelling.  · Wrapping the ankle with an elastic bandage or ankle brace. This helps reduce swelling and gives some support to the ankle. In rare cases, you may need a cast or boot.  · Stretching and other exercises. These improve flexibility and strength.  · Heat packs. These may be recommended before doing ankle exercises.  Possible complications of an ankle sprain  An ankle that has been weakened by a sprain can be more likely to have repeated sprains afterward. Doing exercises to strengthen your ankle and improve balance can reduce your risk for repeated sprains. Other possible complications are long-term (chronic) pain or an ankle that remains unstable.  When to call your healthcare provider  Call your healthcare provider right away if you have any of these:  · Fever of 100.4°F (38°C) or higher, or as directed  · Pain, numbness, discoloration, or coldness in the foot or toes  · Pain that gets worse  · Symptoms that dont get better, or get worse  · New symptoms   Date Last Reviewed: 3/10/2016  © 3947-9859 [employer]. [address] [place] [address], [place] [address]. All rights reserved. This information is not intended as a substitute for professional medical care. Always follow your healthcare professional's instructions.

## 2017-05-19 NOTE — ED NOTES
Right ankle wrapped with ace. Crutches provided to pt with instructions. Pt verbalized understanding. Pt stable, in NAD, and states no further needs at this time. Pt to be d/c'd home.

## 2017-05-19 NOTE — ED AVS SNAPSHOT
[facility]  [address] [address] [address]               Denise Barron   2017  2:24 PM   ED    Description:  Female : 1985   Department:  [facility]           Your Care was Coordinated By:     Provider Role From To    Shiv Flynn PA-C Physician Assistant 17 1427 --      Reason for Visit     Ankle Pain           Diagnoses this Visit        Comments    Sprain of right ankle, unspecified ligament, initial encounter    -  Primary       ED Disposition     ED Disposition Condition Comment    Discharge             To Do List           Follow-up Information     Follow up with Kenji Evans MD. Schedule an appointment as soon as possible for a visit in 2 days.    Specialty:  Family Medicine    Why:  Follow up with your primary care physician in the next 1-2 days for re-evaluation and further management.     Contact information:    [address] [facility] [address]  [phone]          Follow up with [facility].    Specialty:  Emergency Medicine    Why:  If symptoms worsen in any way.     Contact information:    [address] [address] [address] [phone]      [facility]     [facility] Care [facility] -  Assistance  Unless otherwise directed by your provider, please contact [facility], our nurse care line that is available for  assistance.     Registered nurses in the [facility] provide: appointment scheduling, clinical advisement, health education, and other advisory services.  Call: [phone] (toll free)               Medications           Message regarding Medications     Verify the changes and/or additions to your medication regime listed below are the same as discussed with your clinician today.  If any of these changes or additions are incorrect, please notify your healthcare provider.        These medications were  "administered today        Dose Freq    ketorolac tablet 10 mg 10 mg ED 1 Time    Sig: Take 1 tablet (10 mg total) by mouth ED 1 Time.    Class: Normal    Route: Oral    Cosign for Ordering: Required by Nick Emanuel MD           Verify that the below list of medications is an accurate representation of the medications you are currently taking.  If none reported, the list may be blank. If incorrect, please contact your healthcare provider. Carry this list with you in case of emergency.           Current Medications     amlodipine (NORVASC) 5 MG tablet Take 5 mg by mouth once daily.    cholecalciferol, vitamin D3, 5,000 unit capsule 5,000 Units once daily.     fluticasone (FLONASE) 50 mcg/actuation nasal spray 2 sprays by Nasal route once daily at 6am.     hydrochlorothiazide (MICROZIDE) 12.5 mg capsule Take 12.5 mg by mouth every morning.    lamotrigine (LAMICTAL) 200 MG tablet Take 200 mg by mouth every evening.    losartan (COZAAR) 50 MG tablet Take 50 mg by mouth once daily.    warfarin (COUMADIN) 6 MG tablet Take 6 mg by mouth Daily.    hydrOXYzine pamoate (VISTARIL) 50 MG Cap Take 1 capsule (50 mg total) by mouth every 6 (six) hours as needed (Itching).    predniSONE (DELTASONE) 20 MG tablet Take 1 tablet 3 times per day for 3 days, then  Take 1 tablet 2 times per day for 3 days, then   Take 1 tablet daily for 3 days           Clinical Reference Information           Your Vitals Were     BP Pulse Temp Resp Height Weight    139/76 (BP Location: Left arm, Patient Position: Sitting) 101 98.8 °F (37.1 °C) (Oral) 20 5' 6" (1.676 m) 127 kg (280 lb)    Last Period SpO2 BMI          05/01/2017 (Exact Date) 97% 45.19 kg/m2        Allergies as of 5/19/2017        Reactions    Shellfish Containing Products Rash    Penicillins       Immunizations Administered on Date of Encounter - 5/19/2017     None      ED Micro, Lab, POCT     None      ED Imaging Orders     Start Ordered       Status Ordering Provider    05/19/17 " 1436 05/19/17 1435  X-Ray Ankle Complete Right  1 time imaging      Final result         Discharge Instructions         ACE Wrap  Minor muscle or joint injuries are often treated with an elastic bandage. The bandage provides support and compression to the injured area. An elastic bandage is a stretchy, rolled bandage. Elastic bandages range in width from 2 to 6 inches. They can be used for a variety of injuries. The bandages are often called ACE bandages, after the most common brand name.  If used correctly, elastic bandages help control swelling and ease pain. An elastic bandage is also a good reminder not to overuse the injured area. However, elastic bandages do not provide a lot of support and will not prevent reinjury.  Home care    To apply an elastic bandage:  · Check the skin before wrapping the injury. It should be clean, dry, and free of drainage.  · Start wrapping below the injury and work your way toward the body. For an ankle sprain, start wrapping around the foot and work up toward the calf. This will help control swelling.  · Overlap the edges of the bandage so it stays snuggly in place.  · Wrap the bandage firmly, but not too tightly. A tight bandage can increase swelling on either end of the bandage. Make sure the bandage is wrinkle free.  · Leave fingers and toes exposed.  · Secure ends of the bandage (even self-sticking ones) with clips or tape.  · Check frequently to ensure adequate circulation, especially in the fingers and toes. Loosen the bandage if there is local swelling, numbness, tingling, discomfort, coldness, or discoloration (skin pale or bluish in color).  · Rewrap the bandage as needed during the day. Reroll the bandage as you unwind it.  Continue using the elastic bandage until the pain and swelling are gone or as your healthcare provider advises.  If you have been told to ice the area, the ice can be secured in place with the elastic bandage. Wrap the ice pack with a thin towel to  protect the skin. Do not put ice or an ice pack directly on the skin.  Ice the area for no more than 20 minutes at a time.    Follow-up care  Follow up with your healthcare provider, as advised.  When to seek medical advice  Call your healthcare provider for any of the following:  · Pain and swelling that doesn't get better or gets worse  · Trouble moving injured area  · Skin discoloration, numbness, or tingling that doesnt go away after bandage is removed  Date Last Reviewed: 9/13/2015 © 2000-2016 [employer]. [address] [place] [address]. All rights reserved. This information is not intended as a substitute for professional medical care. Always follow your healthcare professional's instructions.          Treating Ankle Sprains  Treatment will depend on how bad your sprain is. For a severe sprain, healing may take 3 months or more.  Right after your injury: Use R.I.C.E.  · Rest: At first, keep weight off the ankle as much as you can. You may be given crutches to help you walk without putting weight on the ankle.  · Ice: Put an ice pack on the ankle for 15 minutes. Remove the pack and wait at least 30 minutes. Repeat for up to 3 days. This helps reduce swelling.  · Compression: To reduce swelling and keep the joint stable, you may need to wrap the ankle with an elastic bandage. For more severe sprains, you may need an ankle brace or a cast.  · Elevation: To reduce swelling, keep your ankle raised above your heart when you sit or lie down.  Medicine  Your healthcare provider may suggest oral non-steroidal anti-inflammatory medicine (NSAIDs), such as ibuprofen. This relieves the pain and helps reduce any swelling. Be sure to take your medicine as directed.  Contrast baths  After 3 days, soak your ankle in warm water for 30 seconds, then in cool water for 30 seconds. Go back and forth for 5 minutes. Doing this every 2 hours will help keep the swelling down.  Exercises    After about 2 to 3  weeks, you may be given exercises to strengthen the ligaments and muscles in the ankle. Doing these exercises will help prevent another ankle sprain. Exercises may include standing on your toes and then on your heels and doing ankle curls.  Ankle curls  · Sit on the edge of a sturdy table or lie on your back.  · Pull your toes toward you. Then point them away from you. Repeat for 2 to 3 minutes.   Date Last Reviewed: 9/28/2015 © 2000-2016 [employer]. [address] [place] [address]. All rights reserved. This information is not intended as a substitute for professional medical care. Always follow your healthcare professional's instructions.          Understanding Ankle Sprain    The ankle is the joint where the leg and foot meet. Bones are held in place by connective tissue called ligaments. When ankle ligaments are stretched to the point of pain and injury, it is called an ankle sprain. A sprain can tear the ligaments. These tears can be very small but still cause pain. Ankle sprains can be mild or severe.  What causes an ankle sprain?  A sprain may occur when you twist your ankle or bend it too far. This can happen when you stumble or fall. Things that can make an ankle sprain more likely include:  · Having had an ankle sprain before  · Playing sports that involve running and jumping. Or playing contact sports such as football or hockey.  · Wearing shoes that dont support your feet and ankles well  · Having ankles with poor strength and flexibility  Symptoms of an ankle sprain  Symptoms may include:  · Pain or soreness in the ankle  · Swelling  · Redness or bruising  · Not being able to walk or put weight on the affected foot  · Reduced range of motion in the ankle  · A popping or tearing feeling at the time the sprain occurs  · An abnormal or dislocated look to the ankle  · Instability or too much range of motion in the ankle  Treatment for an ankle sprain  Treatment focuses on reducing  pain and swelling, and avoiding further injury. Treatments may include:  · Resting the ankle. Avoid putting weight on it. This may mean using crutches until the sprain heals.  · Prescription or over-the-counter pain medicines. These help reduce swelling and pain.  · Cold packs. These help reduce pain and swelling.  · Raising your ankle above your heart. This helps reduce swelling.  · Wrapping the ankle with an elastic bandage or ankle brace. This helps reduce swelling and gives some support to the ankle. In rare cases, you may need a cast or boot.  · Stretching and other exercises. These improve flexibility and strength.  · Heat packs. These may be recommended before doing ankle exercises.  Possible complications of an ankle sprain  An ankle that has been weakened by a sprain can be more likely to have repeated sprains afterward. Doing exercises to strengthen your ankle and improve balance can reduce your risk for repeated sprains. Other possible complications are long-term (chronic) pain or an ankle that remains unstable.  When to call your healthcare provider  Call your healthcare provider right away if you have any of these:  · Fever of 100.4°F (38°C) or higher, or as directed  · Pain, numbness, discoloration, or coldness in the foot or toes  · Pain that gets worse  · Symptoms that dont get better, or get worse  · New symptoms   Date Last Reviewed: 3/10/2016  © 9950-7757 [employer]. [address] [place] [address]. All rights reserved. This information is not intended as a substitute for professional medical care. Always follow your healthcare professional's instructions.          [facility] Sign-Up     Activating your [facility] account is as easy as 1-2-3!     1) Visit [email or web address], select Sign Up Now, enter this activation code and your date of birth, then select Next.  [email or web address]  Expires: 6/16/2017  8:48 PM      2) Create a username and password to use when you visit  [facility] in the future and select a security question in case you lose your password and select Next.    3) Enter your e-mail address and click Sign Up!    Additional Information  If you have questions, please e-mail [email or web address] or call [phone] to talk to our [facility] staff. Remember, [facility] is NOT to be used for urgent needs. For medical emergencies, dial 911.          [facility] complies with applicable Federal civil rights laws and does not discriminate on the basis of race, color, national origin, age, disability, or sex.        Language Assistance Services     ATTENTION: Language assistance services are available, free of charge. Please call [phone].      ATENCIÓN: Si franco epperson, tiene a zamora disposición servicios gratuitos de asistencia lingüística. Llame al [phone].     CHÚ Ý: N?u b?n nói Ti?ng Vi?t, có các d?ch v? h? tr? ngôn ng? mi?n phí dành cho b?n. G?i s? [phone].

## 2017-06-04 ENCOUNTER — HOSPITAL ENCOUNTER (EMERGENCY)
Facility: HOSPITAL | Age: 32
Discharge: HOME | End: 2017-06-04
Attending: EMERGENCY MEDICINE
Payer: COMMERCIAL

## 2017-06-04 VITALS
HEART RATE: 91 BPM | BODY MASS INDEX: 45.8 KG/M2 | OXYGEN SATURATION: 99 % | DIASTOLIC BLOOD PRESSURE: 91 MMHG | TEMPERATURE: 98 F | SYSTOLIC BLOOD PRESSURE: 147 MMHG | HEIGHT: 66 IN | WEIGHT: 285 LBS | RESPIRATION RATE: 18 BRPM

## 2017-06-04 DIAGNOSIS — H66.90 OTITIS MEDIA, UNSPECIFIED CHRONICITY, UNSPECIFIED LATERALITY, UNSPECIFIED OTITIS MEDIA TYPE: ICD-10-CM

## 2017-06-04 DIAGNOSIS — H60.503 ACUTE OTITIS EXTERNA OF BOTH EARS, UNSPECIFIED TYPE: Primary | ICD-10-CM

## 2017-06-04 PROCEDURE — 99283 EMERGENCY DEPT VISIT LOW MDM: CPT

## 2017-06-04 RX ORDER — AZITHROMYCIN 250 MG/1
TABLET, FILM COATED ORAL
Qty: 6 TABLET | Refills: 0 | Status: SHIPPED | OUTPATIENT
Start: 2017-06-04 | End: 2017-07-20

## 2017-06-05 NOTE — ED PROVIDER NOTES
"Encounter Date: 6/4/2017       History     Chief Complaint   Patient presents with    Otalgia     bilateral since last night      Review of patient's allergies indicates:   Allergen Reactions    Shellfish containing products Rash    Penicillins      The history is provided by the patient.   Otalgia   This is a new problem. The current episode started yesterday. There is pain in both ears. The problem occurs constantly. The problem has been unchanged. Pain scale: mild. Associated symptoms include ear discharge. Pertinent negatives include no headaches, no hearing loss, no rhinorrhea, no sore throat, no abdominal pain, no diarrhea, no vomiting, no neck pain, no cough and no rash. Her past medical history does not include chronic ear infection, hearing loss or tympanostomy tube.     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   HENT: Positive for ear discharge and ear pain. Negative for hearing loss, rhinorrhea and sore throat.    Respiratory: Negative for cough and shortness of breath.    Cardiovascular: Negative for chest pain.   Gastrointestinal: Negative for abdominal pain, diarrhea, nausea and vomiting.   Genitourinary: Negative for dysuria.   Musculoskeletal: Negative for back pain and neck pain.   Skin: Negative for rash.   Neurological: Negative for weakness and headaches.   Hematological: Does not bruise/bleed easily.   All other systems reviewed and are negative.      Physical Exam     Initial Vitals [06/04/17 2215]   BP Pulse Resp " Temp SpO2   (!) 147/91 91 18 98.4 °F (36.9 °C) 99 %     Physical Exam    Nursing note and vitals reviewed.  Constitutional: She appears well-developed and well-nourished.   HENT:   Head: Normocephalic and atraumatic.   Right Ear: No lacerations. There is drainage and tenderness. No foreign bodies. No mastoid tenderness. Tympanic membrane is injected and erythematous. Tympanic membrane is not perforated. No hemotympanum. Decreased hearing is noted.   Left Ear: No lacerations. There is drainage and tenderness. No foreign bodies. No mastoid tenderness. Tympanic membrane is injected and erythematous. Tympanic membrane is not perforated. No hemotympanum. Decreased hearing is noted.   Nose: Nose normal.   Mouth/Throat: Uvula is midline, oropharynx is clear and moist and mucous membranes are normal. No oropharyngeal exudate.   Eyes: Conjunctivae and EOM are normal. Pupils are equal, round, and reactive to light.   Neck: Normal range of motion. Neck supple. No thyromegaly present.   Cardiovascular: Normal rate, regular rhythm, normal heart sounds and intact distal pulses. Exam reveals no gallop and no friction rub.    No murmur heard.  Pulmonary/Chest: Breath sounds normal. No respiratory distress. She has no wheezes. She has no rhonchi. She exhibits no tenderness.   Abdominal: Soft. Bowel sounds are normal. She exhibits no distension. There is no tenderness. There is no rebound and no guarding.   Musculoskeletal: Normal range of motion. She exhibits no edema or tenderness.   Lymphadenopathy:     She has no cervical adenopathy.   Neurological: She is alert and oriented to person, place, and time. She has normal strength. No cranial nerve deficit or sensory deficit.   Skin: Skin is warm and dry. No rash noted.   Psychiatric: She has a normal mood and affect. Her behavior is normal. Judgment and thought content normal.         ED Course   Procedures  Labs Reviewed - No data to display       Vitals:    06/04/17 2215   BP: (!)  "147/91   Pulse: 91   Resp: 18   Temp: 98.4 °F (36.9 °C)   TempSrc: Oral   SpO2: 99%   Weight: 129.3 kg (285 lb)   Height: 5' 6" (1.676 m)           Imaging Results    None         Medications - No data to display    10:51 PM - Re-evaluation: The patient is resting comfortably and is in no acute distress. She states that her symptoms have improved after treatment within ER. Discussed test results, shared treatment plan, specific conditions for return, and importance of follow up with patient and family.  She understands and agrees with the plan as discussed. Answered  her questions at this time. She has remained hemodynamically stable throughout the ED course and is appropriate for discharge home.     Regarding OTITIS MEDIA, I recommended the use of ibuprofen and/or acetaminophen for management of pain or fever and the use of heat (utilizing a warm, moist washcloth on the ear to decrease pain for 15-20 minutes every 4 hours as needed) and/or ice (to help decrease swelling and pain utilizing an ice pack applied to the ear for 15-20 minutes every 4 hours as needed) to decrease pain.  Reiterated the importance of following up with primary care provider, especially if the pain gets worse or persist even after treatment; ear is tender or swollen; develop nausea, vomiting, or diarrhea; notice fluid draining from ear; or have any questions regarding the management and treatment of otitis media.  Also discussed importance of returning to the emergency department for febrile seizures, intractable fever, stiff neck, or difficulty breathing.     Regarding OTITIS EXTERNA, I recommended the use of ibuprofen and/or acetaminophen for management of pain or fever and the use of heat (utilizing a warm, moist washcloth on the ear to decrease pain for 15-20 minutes every 4 hours as needed) and/or ice (to help decrease swelling and pain utilizing an ice pack applied to the ear for 15-20 minutes every 4 hours as needed) to decrease pain.  " Apply medications as directed.  Avoid swimming and keep ears completely dry for 3-4 weeks.  Reiterated the importance of following up with primary care provider, especially if the pain gets worse or persist even after treatment; ear is tender or swollen; develop nausea, vomiting, or diarrhea; notice fluid draining from ear; hearing loss develops; there is any change in mental or neurologic status; or have any questions regarding the management and treatment of otitis externa.  Also discussed importance of returning to the emergency department for febrile seizures, intractable fever, stiff neck, or difficulty breathing.       Pre-hypertension/Hypertension: The pt has been informed that they may have pre-hypertension or hypertension based on a blood pressure reading in the ED. I recommend that the pt call the PCP listed on their discharge instructions or a physician of their choice this week to arrange f/u for further evaluation of possible pre-hypertension or hypertension.     Denise Barron was given a handout which discussed their disease process, precautions, and instructions for follow-up and therapy.    Follow-up Information     Kenji Evans MD. Schedule an appointment as soon as possible for a visit in 1 week.    Specialty:  Family Medicine  Why:  reevaluated otitis  Contact information:  [address] [facility] [address]  [phone]             [facility].    Specialty:  Emergency Medicine  Why:  As needed, If symptoms worsen  Contact information:  [address] [address] [id number] [phone]                 Discharge Medication List as of 6/4/2017 10:55 PM      START taking these medications    Details   azithromycin (ZITHROMAX Z-ALICE) 250 MG tablet Take z pack as directed, Print      ciprofloxacin-hydrocortisone 0.2-1% (CIPRO HC OTIC) otic suspension Place 3 drops into both ears 2 (two) times daily., Starting Sun 6/4/2017, Print                 ED Diagnosis  1. Acute otitis externa of both ears, unspecified type    2. Otitis media, unspecified chronicity, unspecified laterality, unspecified otitis media type                             ED Course     Clinical Impression:   The primary encounter diagnosis was Acute otitis externa of both ears, unspecified type. A diagnosis of Otitis media, unspecified chronicity, unspecified laterality, unspecified otitis media type was also pertinent to this visit.    Disposition:   Disposition: Discharged  Condition: Stable       Kevyn Phillips Jr., MD  06/05/17 033

## 2017-06-05 NOTE — DISCHARGE INSTRUCTIONS
Regarding OTITIS EXTERNA, I recommended the use of ibuprofen and/or acetaminophen for management of pain or fever and the use of heat (utilizing a warm, moist washcloth on the ear to decrease pain for 15-20 minutes every 4 hours as needed) and/or ice (to help decrease swelling and pain utilizing an ice pack applied to the ear for 15-20 minutes every 4 hours as needed) to decrease pain.  Apply medications as directed.  Avoid swimming and keep ears completely dry for 3-4 weeks.  Reiterated the importance of following up with primary care provider, especially if the pain gets worse or persist even after treatment; ear is tender or swollen; develop nausea, vomiting, or diarrhea; notice fluid draining from ear; hearing loss develops; there is any change in mental or neurologic status; or have any questions regarding the management and treatment of otitis externa.  Also discussed importance of returning to the emergency department for febrile seizures, intractable fever, stiff neck, or difficulty breathing.     Regarding OTITIS MEDIA, I recommended the use of ibuprofen and/or acetaminophen for management of pain or fever and the use of heat (utilizing a warm, moist washcloth on the ear to decrease pain for 15-20 minutes every 4 hours as needed) and/or ice (to help decrease swelling and pain utilizing an ice pack applied to the ear for 15-20 minutes every 4 hours as needed) to decrease pain.  Reiterated the importance of following up with primary care provider, especially if the pain gets worse or persist even after treatment; ear is tender or swollen; develop nausea, vomiting, or diarrhea; notice fluid draining from ear; or have any questions regarding the management and treatment of otitis media.  Also discussed importance of returning to the emergency department for febrile seizures, intractable fever, stiff neck, or difficulty breathing.

## 2017-06-05 NOTE — ED NOTES
Dr. Phillips aware of pt's vital signs & states OK for discharge. Pt is stable, in NAD, RR equal & unlabored. Pt denies any further needs, questions, concerns or complaints. Will d/c per MD order.

## 2017-06-13 ENCOUNTER — HOSPITAL ENCOUNTER (EMERGENCY)
Facility: HOSPITAL | Age: 32
Discharge: HOME | End: 2017-06-13
Attending: EMERGENCY MEDICINE
Payer: COMMERCIAL

## 2017-06-13 VITALS
OXYGEN SATURATION: 95 % | RESPIRATION RATE: 20 BRPM | BODY MASS INDEX: 48.42 KG/M2 | HEART RATE: 84 BPM | DIASTOLIC BLOOD PRESSURE: 78 MMHG | TEMPERATURE: 98 F | WEIGHT: 293 LBS | SYSTOLIC BLOOD PRESSURE: 144 MMHG

## 2017-06-13 DIAGNOSIS — L40.9 PSORIASIS: Primary | ICD-10-CM

## 2017-06-13 PROCEDURE — 96372 THER/PROPH/DIAG INJ SC/IM: CPT

## 2017-06-13 PROCEDURE — 25000003 PHARM REV CODE 250: Performed by: EMERGENCY MEDICINE

## 2017-06-13 PROCEDURE — 63600175 PHARM REV CODE 636 W HCPCS: Performed by: EMERGENCY MEDICINE

## 2017-06-13 PROCEDURE — 99283 EMERGENCY DEPT VISIT LOW MDM: CPT | Mod: 25

## 2017-06-13 RX ORDER — METHYLPREDNISOLONE 4 MG/1
TABLET ORAL
Qty: 1 PACKAGE | Refills: 0 | Status: SHIPPED | OUTPATIENT
Start: 2017-06-13 | End: 2017-07-04

## 2017-06-13 RX ORDER — HYDROXYZINE PAMOATE 25 MG/1
50 CAPSULE ORAL
Status: COMPLETED | OUTPATIENT
Start: 2017-06-13 | End: 2017-06-13

## 2017-06-13 RX ORDER — HYDROXYZINE HYDROCHLORIDE 25 MG/1
50 TABLET, FILM COATED ORAL 4 TIMES DAILY PRN
Qty: 20 TABLET | Refills: 0 | Status: SHIPPED | OUTPATIENT
Start: 2017-06-13 | End: 2017-12-23

## 2017-06-13 RX ORDER — DEXAMETHASONE SODIUM PHOSPHATE 4 MG/ML
8 INJECTION, SOLUTION INTRA-ARTICULAR; INTRALESIONAL; INTRAMUSCULAR; INTRAVENOUS; SOFT TISSUE
Status: COMPLETED | OUTPATIENT
Start: 2017-06-13 | End: 2017-06-13

## 2017-06-13 RX ADMIN — DEXAMETHASONE SODIUM PHOSPHATE 8 MG: 4 INJECTION, SOLUTION INTRAMUSCULAR; INTRAVENOUS at 11:06

## 2017-06-13 RX ADMIN — HYDROXYZINE PAMOATE 50 MG: 25 CAPSULE ORAL at 11:06

## 2017-06-14 NOTE — ED PROVIDER NOTES
"Encounter Date: 6/13/2017       History     Chief Complaint   Patient presents with    Rash     ezcema flare up. out of steroid cream but has Rx called in. needs relief for itching. benedry taking around 2000     Review of patient's allergies indicates:   Allergen Reactions    Shellfish containing products Rash    Penicillins      Patient currently presents with concerns regarding rash.  Onset noted > 1 week ago.  Process is distributed diffusely.  Rash is described as dry, scaly patches consistent with her history of psoriasis.  There is not associated fever.  Patient/family reports associated itching.  There is not a recent history of new medications or foods.  This has occurred previously.  Patient previously using topical therapy but currently out of her medication.  Has not seen her dermatologist for this particular flare.          Past Medical History:   Diagnosis Date    Hypertension     Obesity     Psoriasis     Seizures     Stroke      Past Surgical History:   Procedure Laterality Date    APPENDECTOMY      TOE AMPUTATION      Right great toe amputated due to infection from "hangnail"      Family History   Problem Relation Age of Onset    Cancer Neg Hx      Social History   Substance Use Topics    Smoking status: Never Smoker    Smokeless tobacco: Never Used    Alcohol use No     Review of Systems   Constitutional: Negative for chills and fever.   HENT: Negative for congestion and rhinorrhea.    Respiratory: Negative for cough, chest tightness, shortness of breath and wheezing.    Cardiovascular: Negative for chest pain, palpitations and leg swelling.   Gastrointestinal: Negative for abdominal pain, constipation, diarrhea, nausea and vomiting.   Genitourinary: Negative for dysuria, frequency, urgency, vaginal bleeding and vaginal discharge.   Skin: Positive for rash. Negative for color change.   Allergic/Immunologic: Negative for immunocompromised state.   Neurological: Negative for dizziness, " weakness and numbness.   Hematological: Negative for adenopathy. Does not bruise/bleed easily.   All other systems reviewed and are negative.      Physical Exam     Initial Vitals [06/13/17 2245]   BP Pulse Resp Temp SpO2   (!) 157/91 92 20 98.4 °F (36.9 °C) 95 %     Physical Exam    Nursing note and vitals reviewed.  Constitutional: She appears well-developed and well-nourished. She is not diaphoretic. No distress.   HENT:   Head: Normocephalic and atraumatic.   Right Ear: External ear normal.   Left Ear: External ear normal.   Nose: Nose normal.   Mouth/Throat: Oropharynx is clear and moist.   Eyes: Conjunctivae and EOM are normal. Pupils are equal, round, and reactive to light. No scleral icterus.   Cardiovascular: Normal rate, regular rhythm and normal heart sounds.   Pulmonary/Chest: Breath sounds normal. No respiratory distress.   Neurological: She is alert and oriented to person, place, and time.   Skin: Skin is warm and dry.   Diffuse patches of silver, scaly rash concentrated over the the dorsal aspect of the extremities and back         ED Course   Procedures  Labs Reviewed - No data to display          Medical Decision Making:   Initial Assessment:   All findings were reviewed with the patient/family in detail.  Findings consistent with a flare of psoriasis.  Will administer steroids given the diffuse nature of her current process.  All remaining questions and concerns were addressed at that time.  Patient has been counseled regarding the need for follow-up as well as the indication to return to the emergency room should new or worrisome developments occur.  Nick Emanuel MD  3:40 AM                     ED Course     Clinical Impression:   The encounter diagnosis was Psoriasis.          Nick Emanuel MD  06/14/17 7968

## 2017-07-20 ENCOUNTER — HOSPITAL ENCOUNTER (EMERGENCY)
Facility: HOSPITAL | Age: 32
Discharge: HOME | End: 2017-07-20
Attending: EMERGENCY MEDICINE
Payer: COMMERCIAL

## 2017-07-20 VITALS
SYSTOLIC BLOOD PRESSURE: 137 MMHG | DIASTOLIC BLOOD PRESSURE: 76 MMHG | WEIGHT: 293 LBS | OXYGEN SATURATION: 98 % | HEIGHT: 66 IN | HEART RATE: 79 BPM | RESPIRATION RATE: 17 BRPM | TEMPERATURE: 98 F | BODY MASS INDEX: 47.09 KG/M2

## 2017-07-20 DIAGNOSIS — R56.9 SEIZURE: ICD-10-CM

## 2017-07-20 DIAGNOSIS — G40.909 RECURRENT SEIZURES: Primary | ICD-10-CM

## 2017-07-20 DIAGNOSIS — D64.9 ANEMIA, UNSPECIFIED TYPE: ICD-10-CM

## 2017-07-20 LAB
ALBUMIN SERPL BCP-MCNC: 3 G/DL
ALP SERPL-CCNC: 63 U/L
ALT SERPL W/O P-5'-P-CCNC: 11 U/L
ANION GAP SERPL CALC-SCNC: 7 MMOL/L
ANISOCYTOSIS BLD QL SMEAR: SLIGHT
AST SERPL-CCNC: 9 U/L
B-HCG UR QL: NEGATIVE
BACTERIA #/AREA URNS AUTO: ABNORMAL /HPF
BASOPHILS # BLD AUTO: 0.01 K/UL
BASOPHILS NFR BLD: 0.1 %
BILIRUB SERPL-MCNC: 0.1 MG/DL
BILIRUB UR QL STRIP: NEGATIVE
BUN SERPL-MCNC: 8 MG/DL
CALCIUM SERPL-MCNC: 9.2 MG/DL
CHLORIDE SERPL-SCNC: 105 MMOL/L
CLARITY UR REFRACT.AUTO: CLEAR
CO2 SERPL-SCNC: 26 MMOL/L
COLOR UR AUTO: YELLOW
CREAT SERPL-MCNC: 0.8 MG/DL
DIFFERENTIAL METHOD: ABNORMAL
EOSINOPHIL # BLD AUTO: 0.1 K/UL
EOSINOPHIL NFR BLD: 1 %
ERYTHROCYTE [DISTWIDTH] IN BLOOD BY AUTOMATED COUNT: 17.8 %
EST. GFR  (AFRICAN AMERICAN): >60 ML/MIN/1.73 M^2
EST. GFR  (NON AFRICAN AMERICAN): >60 ML/MIN/1.73 M^2
GLUCOSE SERPL-MCNC: 90 MG/DL
GLUCOSE UR QL STRIP: NEGATIVE
HCT VFR BLD AUTO: 35 %
HGB BLD-MCNC: 10.2 G/DL
HGB UR QL STRIP: NEGATIVE
INR PPP: 0.9
KETONES UR QL STRIP: NEGATIVE
LEUKOCYTE ESTERASE UR QL STRIP: ABNORMAL
LYMPHOCYTES # BLD AUTO: 1.4 K/UL
LYMPHOCYTES NFR BLD: 16.1 %
MCH RBC QN AUTO: 19.7 PG
MCHC RBC AUTO-ENTMCNC: 29.1 G/DL
MCV RBC AUTO: 68 FL
MICROSCOPIC COMMENT: ABNORMAL
MONOCYTES # BLD AUTO: 0.7 K/UL
MONOCYTES NFR BLD: 7.4 %
NEUTROPHILS # BLD AUTO: 6.7 K/UL
NEUTROPHILS NFR BLD: 75.4 %
NITRITE UR QL STRIP: NEGATIVE
OVALOCYTES BLD QL SMEAR: ABNORMAL
PH UR STRIP: 6 [PH] (ref 5–8)
PLATELET # BLD AUTO: 412 K/UL
PMV BLD AUTO: 11 FL
POIKILOCYTOSIS BLD QL SMEAR: SLIGHT
POLYCHROMASIA BLD QL SMEAR: ABNORMAL
POTASSIUM SERPL-SCNC: 3.8 MMOL/L
PROT SERPL-MCNC: 7.6 G/DL
PROT UR QL STRIP: NEGATIVE
PROTHROMBIN TIME: 9.6 SEC
RBC # BLD AUTO: 5.17 M/UL
RBC #/AREA URNS AUTO: 0 /HPF (ref 0–4)
SODIUM SERPL-SCNC: 138 MMOL/L
SP GR UR STRIP: <=1.005 (ref 1–1.03)
SQUAMOUS #/AREA URNS AUTO: 8 /HPF
STOMATOCYTES BLD QL SMEAR: PRESENT
URN SPEC COLLECT METH UR: ABNORMAL
UROBILINOGEN UR STRIP-ACNC: NEGATIVE EU/DL
WBC # BLD AUTO: 8.95 K/UL
WBC #/AREA URNS AUTO: 6 /HPF (ref 0–5)

## 2017-07-20 PROCEDURE — 81000 URINALYSIS NONAUTO W/SCOPE: CPT

## 2017-07-20 PROCEDURE — 80175 DRUG SCREEN QUAN LAMOTRIGINE: CPT

## 2017-07-20 PROCEDURE — 96361 HYDRATE IV INFUSION ADD-ON: CPT

## 2017-07-20 PROCEDURE — 25000003 PHARM REV CODE 250: Performed by: EMERGENCY MEDICINE

## 2017-07-20 PROCEDURE — 85610 PROTHROMBIN TIME: CPT

## 2017-07-20 PROCEDURE — 96360 HYDRATION IV INFUSION INIT: CPT

## 2017-07-20 PROCEDURE — 85025 COMPLETE CBC W/AUTO DIFF WBC: CPT

## 2017-07-20 PROCEDURE — 99284 EMERGENCY DEPT VISIT MOD MDM: CPT | Mod: 25

## 2017-07-20 PROCEDURE — 80053 COMPREHEN METABOLIC PANEL: CPT

## 2017-07-20 PROCEDURE — 81025 URINE PREGNANCY TEST: CPT

## 2017-07-20 RX ORDER — ACETAMINOPHEN 325 MG/1
975 TABLET ORAL
Status: COMPLETED | OUTPATIENT
Start: 2017-07-20 | End: 2017-07-20

## 2017-07-20 RX ORDER — SODIUM CHLORIDE 9 MG/ML
1000 INJECTION, SOLUTION INTRAVENOUS
Status: COMPLETED | OUTPATIENT
Start: 2017-07-20 | End: 2017-07-20

## 2017-07-20 RX ORDER — FOLIC ACID 1 MG/1
1 TABLET ORAL DAILY
COMMUNITY
End: 2017-08-31

## 2017-07-20 RX ADMIN — ACETAMINOPHEN 975 MG: 325 TABLET ORAL at 03:07

## 2017-07-20 RX ADMIN — SODIUM CHLORIDE 1000 ML: 0.9 INJECTION, SOLUTION INTRAVENOUS at 03:07

## 2017-07-20 NOTE — DISCHARGE INSTRUCTIONS
SEIZURE PRECAUTION  [facility]- Department of Emergency Medicine has discussed and alerted you to the danger of driving, after being diagnosed with a Seizure or possible Seizures Disorder.  The situation of driving and Seizure Disorder is very dangerous for you as the patient, as well as others on the road. It was explained to you that seizure medication does not guarantee the full control of seizure episodes. Seizures can occur at any time and anywhere and in any situation.  Your Physician discussed with you Seizure Safety Precautions, and you were informed that patients with Seizure Disorders should avoid the following:  Unattended Swimming.  Working in the fireplace.  Climbing high ladders, steps, and trees.  Working with sharp cutting machines and tools, or any other potentially harmful activity.  You understand that the [facility] does not require the doctor to report Seizure Disorders to the Department of Motor Vehicles. However, you are aware that you, as a patient with a Seizure Disorder, are personally obligated to inform the doctor and the Motor Vehicle Department if a situation arises.    Regarding ANEMIA, I discussed with patient the signs and symptoms related to anemia such as paleness, fatigue, tachycardia, cold hands and feet, brittle nails, headaches, and SOB. Encouraged patient to eat foods high in iron (liver and other meats; seafood; dried fruits, nuts; beans; green leafy vegetables; whole grains; and iron-fortified breads and cereals), take iron supplements with food, increase fiber intake, and take supplement early in day. Advised patient to notify primary care provider of any bothersome side effects (heartburn, constipation, abdominal pain).

## 2017-07-20 NOTE — ED PROVIDER NOTES
"Encounter Date: 7/20/2017       History     Chief Complaint   Patient presents with    Seizures     The history is provided by the patient.   Seizures    This is a recurrent problem. The current episode started 1 to 2 hours ago. The problem has been resolved. There was 1 seizure. The most recent episode lasted 2 to 5 minutes. Associated symptoms include sleepiness. Pertinent negatives include no confusion, no headaches, no speech difficulty, no visual disturbance, no neck stiffness, no sore throat, no chest pain, no cough, no nausea, no vomiting, no diarrhea and no muscle weakness. Characteristics include eye blinking, rhythmic jerking, loss of consciousness and bit tongue. Characteristics do not include eye deviation, bowel incontinence, bladder incontinence or apnea. The episode was witnessed. There was no sensation of an aura present. The seizures did not continue in the ED. The seizure(s) had no focality. Possible causes include sleep deprivation. Possible causes do not include medication or dosage change, missed seizure meds, recent illness or change in alcohol use. There were no medications administered prior to arrival.     Review of patient's allergies indicates:   Allergen Reactions    Shellfish containing products Rash    Penicillins      Past Medical History:   Diagnosis Date    Hypertension     Obesity     Psoriasis     Seizures     Stroke      Past Surgical History:   Procedure Laterality Date    APPENDECTOMY      TOE AMPUTATION      Right great toe amputated due to infection from "hangnail"      Family History   Problem Relation Age of Onset    Cancer Neg Hx      Social History   Substance Use Topics    Smoking status: Never Smoker    Smokeless tobacco: Never Used    Alcohol use No     Review of Systems   Constitutional: Negative for fever.   HENT: Negative for sore throat.    Eyes: Negative for visual disturbance.   Respiratory: Negative for apnea, cough and shortness of breath.  "   Cardiovascular: Negative for chest pain.   Gastrointestinal: Negative for bowel incontinence, diarrhea, nausea and vomiting.   Genitourinary: Negative for bladder incontinence and dysuria.   Musculoskeletal: Negative for back pain.   Skin: Negative for rash.   Neurological: Positive for seizures and loss of consciousness. Negative for speech difficulty, weakness and headaches.   Hematological: Does not bruise/bleed easily.   Psychiatric/Behavioral: Negative for confusion.   All other systems reviewed and are negative.      Physical Exam     Initial Vitals [07/20/17 0239]   BP Pulse Resp Temp SpO2   129/76 (!) 115 20 98.9 °F (37.2 °C) 98 %      MAP       93.67         Physical Exam    Nursing note and vitals reviewed.  Constitutional: She appears well-developed and well-nourished.   HENT:   Head: Normocephalic and atraumatic.   Mouth/Throat: No oropharyngeal exudate.   Eyes: Conjunctivae and EOM are normal. Pupils are equal, round, and reactive to light.   Neck: Normal range of motion. Neck supple. No thyromegaly present.   Cardiovascular: Normal rate, regular rhythm, normal heart sounds and intact distal pulses. Exam reveals no gallop and no friction rub.    No murmur heard.  Pulmonary/Chest: Breath sounds normal. No respiratory distress. She has no wheezes. She has no rhonchi. She exhibits no tenderness.   Abdominal: Soft. Bowel sounds are normal. She exhibits no distension. There is no tenderness. There is no rebound and no guarding.   Musculoskeletal: Normal range of motion. She exhibits no edema or tenderness.   Lymphadenopathy:     She has no cervical adenopathy.   Neurological: She is alert and oriented to person, place, and time. She has normal strength. No cranial nerve deficit or sensory deficit.   Skin: Skin is warm and dry. No rash noted.   Psychiatric: She has a normal mood and affect. Her behavior is normal. Judgment and thought content normal.         ED Course   Procedures  Labs Reviewed   CBC W/  "AUTO DIFFERENTIAL - Abnormal; Notable for the following:        Result Value    Hemoglobin 10.2 (*)     Hematocrit 35.0 (*)     MCV 68 (*)     MCH 19.7 (*)     MCHC 29.1 (*)     RDW 17.8 (*)     Platelets 412 (*)     Gran% 75.4 (*)     Lymph% 16.1 (*)     All other components within normal limits   COMPREHENSIVE METABOLIC PANEL - Abnormal; Notable for the following:     Albumin 3.0 (*)     AST 9 (*)     Anion Gap 7 (*)     All other components within normal limits   URINALYSIS - Abnormal; Notable for the following:     Leukocytes, UA Trace (*)     All other components within normal limits   URINALYSIS MICROSCOPIC - Abnormal; Notable for the following:     WBC, UA 6 (*)     Bacteria, UA Few (*)     All other components within normal limits   PROTIME-INR   PREGNANCY TEST, URINE RAPID   LAMOTRIGINE LEVEL          Vitals:    07/20/17 0239 07/20/17 0252 07/20/17 0320 07/20/17 0430   BP: 129/76  111/64    Pulse: (!) 115 102 95    Resp: 20 18 18    Temp: 98.9 °F (37.2 °C)   98.5 °F (36.9 °C)   TempSrc: Oral   Oral   SpO2: 98% 98% 100%    Weight: (!) 137.4 kg (303 lb)      Height: 5' 6" (1.676 m)       07/20/17 0447 07/20/17 0532   BP: 103/64 134/72   Pulse: 85 76   Resp: 16 18   Temp:     TempSrc:     SpO2: 100% 100%   Weight:     Height:         Results for orders placed or performed during the hospital encounter of 07/20/17   CBC auto differential   Result Value Ref Range    WBC 8.95 3.90 - 12.70 K/uL    RBC 5.17 4.00 - 5.40 M/uL    Hemoglobin 10.2 (L) 12.0 - 16.0 g/dL    Hematocrit 35.0 (L) 37.0 - 48.5 %    MCV 68 (L) 82 - 98 fL    MCH 19.7 (L) 27.0 - 31.0 pg    MCHC 29.1 (L) 32.0 - 36.0 g/dL    RDW 17.8 (H) 11.5 - 14.5 %    Platelets 412 (H) 150 - 350 K/uL    MPV 11.0 9.2 - 12.9 fL    Gran # 6.7 1.8 - 7.7 K/uL    Lymph # 1.4 1.0 - 4.8 K/uL    Mono # 0.7 0.3 - 1.0 K/uL    Eos # 0.1 0.0 - 0.5 K/uL    Baso # 0.01 0.00 - 0.20 K/uL    Gran% 75.4 (H) 38.0 - 73.0 %    Lymph% 16.1 (L) 18.0 - 48.0 %    Mono% 7.4 4.0 - 15.0 %    " Eosinophil% 1.0 0.0 - 8.0 %    Basophil% 0.1 0.0 - 1.9 %    Aniso Slight     Poik Slight     Poly Occasional     Ovalocytes Occasional     Stomatocytes Present     Differential Method Automated    Comprehensive metabolic panel   Result Value Ref Range    Sodium 138 136 - 145 mmol/L    Potassium 3.8 3.5 - 5.1 mmol/L    Chloride 105 95 - 110 mmol/L    CO2 26 23 - 29 mmol/L    Glucose 90 70 - 110 mg/dL    BUN, Bld 8 6 - 20 mg/dL    Creatinine 0.8 0.5 - 1.4 mg/dL    Calcium 9.2 8.7 - 10.5 mg/dL    Total Protein 7.6 6.0 - 8.4 g/dL    Albumin 3.0 (L) 3.5 - 5.2 g/dL    Total Bilirubin 0.1 0.1 - 1.0 mg/dL    Alkaline Phosphatase 63 55 - 135 U/L    AST 9 (L) 10 - 40 U/L    ALT 11 10 - 44 U/L    Anion Gap 7 (L) 8 - 16 mmol/L    eGFR if African American >60.0 >60 mL/min/1.73 m^2    eGFR if non African American >60.0 >60 mL/min/1.73 m^2   Protime-INR   Result Value Ref Range    Prothrombin Time 9.6 9.0 - 12.5 sec    INR 0.9 0.8 - 1.2   Urinalysis   Result Value Ref Range    Specimen UA Urine, Clean Catch     Color, UA Yellow Yellow, Straw, Ev    Appearance, UA Clear Clear    pH, UA 6.0 5.0 - 8.0    Specific Gravity, UA <=1.005 1.005 - 1.030    Protein, UA Negative Negative    Glucose, UA Negative Negative    Ketones, UA Negative Negative    Bilirubin (UA) Negative Negative    Occult Blood UA Negative Negative    Nitrite, UA Negative Negative    Urobilinogen, UA Negative <2.0 EU/dL    Leukocytes, UA Trace (A) Negative   Pregnancy, urine rapid (UPT)   Result Value Ref Range    Preg Test, Ur Negative    Urinalysis Microscopic   Result Value Ref Range    RBC, UA 0 0 - 4 /hpf    WBC, UA 6 (H) 0 - 5 /hpf    Bacteria, UA Few (A) None-Occ /hpf    Squam Epithel, UA 8 /hpf    Microscopic Comment SEE COMMENT          Imaging Results          CT Head Without Contrast (Preliminary result)  Result time 07/20/17 06:05:53    ED Interpretation by Kevyn Phillips Jr., MD (07/20/17 06:05:53, [facility], Emergency Medicine)     Per statrad:  Unremarkable head/brain CT.                              Medications   acetaminophen tablet 975 mg (975 mg Oral Given 17 0301)   0.9%  NaCl infusion (0 mLs Intravenous Stopped 17 0500)         4:36 AM  - Re-evaluation:  The patient is resting comfortably and is in no acute distress.  Awaiting urine. Discussed test results and notified of pending labs. Answered questions at this time.     6:06 AM - Re-evaluation: The patient is resting comfortably and is in no acute distress. She states that her symptoms have improved after treatment within ER. Discussed test results, shared treatment plan, specific conditions for return, and importance of follow up with patient and family.  She understands and agrees with the plan as discussed. Answered  her questions at this time. She has remained hemodynamically stable throughout the ED course and is appropriate for discharge home.     SEIZURE PRECAUTION  [facility]- Department of Emergency Medicine has discussed and alerted you to the danger of driving, after being diagnosed with a Seizure or possible Seizures Disorder.  The situation of driving and Seizure Disorder is very dangerous for you as the patient, as well as others on the road. It was explained to you that seizure medication does not guarantee the full control of seizure episodes. Seizures can occur at any time and anywhere and in any situation.  Your Physician discussed with you Seizure Safety Precautions, and you were informed that patients with Seizure Disorders should avoid the followin. Unattended Swimming.  2. Working in the fireplace.  3. Climbing high ladders, steps, and trees.  4. Working with sharp cutting machines and tools, or any other potentially harmful activity.  You understand that the [facility] does not require the doctor to report Seizure Disorders to the Department of Motor Vehicles. However, you are aware that you, as a patient with a Seizure  Disorder, are personally obligated to inform the doctor and the Motor Vehicle Department if a situation arises.    Pre-hypertension/Hypertension: The pt has been informed that they may have pre-hypertension or hypertension based on a blood pressure reading in the ED. I recommend that the pt call the PCP listed on their discharge instructions or a physician of their choice this week to arrange f/u for further evaluation of possible pre-hypertension or hypertension.     Denise Barron was given a handout which discussed their disease process, precautions, and instructions for follow-up and therapy.        New Prescriptions    No medications on file          ED Diagnosis  1. Recurrent seizures    2. Seizure    3. Anemia, unspecified type                             ED Course     Clinical Impression:   The primary encounter diagnosis was Recurrent seizures. Diagnoses of Seizure and Anemia, unspecified type were also pertinent to this visit.    Disposition:   Disposition: Discharged  Condition: Stable                        Kevyn Phillips Jr., MD  07/20/17 0613

## 2017-07-20 NOTE — ED NOTES
Pt updated on test results and poc. Pt verbalized understanding, and she denies having any further questions or concerns at this time. Pt will be discharged per md order.

## 2017-07-20 NOTE — ED NOTES
Pt is aaoX4, resp e/u, skin warm and dry, VSS, nad. Pt updated on poc, and she has verbalized understanding. Pt denies having any needs at this time. Bed locked in lowest position, side rails up X2, call bell in reach, family member at bedside. Will continue to monitor.

## 2017-07-24 LAB — LAMOTRIGINE SERPL-MCNC: 3.1 UG/ML (ref 2–15)

## 2017-08-09 ENCOUNTER — HOSPITAL ENCOUNTER (EMERGENCY)
Facility: HOSPITAL | Age: 32
Discharge: HOME | End: 2017-08-10
Attending: EMERGENCY MEDICINE
Payer: COMMERCIAL

## 2017-08-09 DIAGNOSIS — Z91.018 ALLERGY TO FOOD: Primary | ICD-10-CM

## 2017-08-09 PROCEDURE — 99283 EMERGENCY DEPT VISIT LOW MDM: CPT | Mod: 25

## 2017-08-09 PROCEDURE — 96372 THER/PROPH/DIAG INJ SC/IM: CPT

## 2017-08-09 PROCEDURE — 25000003 PHARM REV CODE 250: Performed by: EMERGENCY MEDICINE

## 2017-08-09 PROCEDURE — 63600175 PHARM REV CODE 636 W HCPCS: Performed by: EMERGENCY MEDICINE

## 2017-08-09 RX ORDER — FAMOTIDINE 20 MG/1
20 TABLET, FILM COATED ORAL ONCE
Status: COMPLETED | OUTPATIENT
Start: 2017-08-09 | End: 2017-08-09

## 2017-08-09 RX ORDER — DIPHENHYDRAMINE HCL 25 MG
25 CAPSULE ORAL
Status: COMPLETED | OUTPATIENT
Start: 2017-08-09 | End: 2017-08-09

## 2017-08-09 RX ADMIN — FAMOTIDINE 20 MG: 20 TABLET ORAL at 11:08

## 2017-08-09 RX ADMIN — DIPHENHYDRAMINE HYDROCHLORIDE 25 MG: 25 CAPSULE ORAL at 11:08

## 2017-08-09 RX ADMIN — METHYLPREDNISOLONE SODIUM SUCCINATE 125 MG: 125 INJECTION, POWDER, FOR SOLUTION INTRAMUSCULAR; INTRAVENOUS at 11:08

## 2017-08-10 VITALS
WEIGHT: 293 LBS | HEART RATE: 69 BPM | RESPIRATION RATE: 18 BRPM | TEMPERATURE: 98 F | BODY MASS INDEX: 47.09 KG/M2 | DIASTOLIC BLOOD PRESSURE: 82 MMHG | SYSTOLIC BLOOD PRESSURE: 135 MMHG | HEIGHT: 66 IN | OXYGEN SATURATION: 98 %

## 2017-08-10 PROBLEM — Z91.018 ALLERGY TO FOOD: Status: ACTIVE | Noted: 2017-08-10

## 2017-08-10 RX ORDER — FAMOTIDINE 20 MG/1
20 TABLET, FILM COATED ORAL 2 TIMES DAILY
Qty: 20 TABLET | Refills: 0 | Status: SHIPPED | OUTPATIENT
Start: 2017-08-10 | End: 2017-10-09

## 2017-08-10 RX ORDER — METHYLPREDNISOLONE 4 MG/1
TABLET ORAL
Qty: 1 PACKAGE | Refills: 0 | Status: SHIPPED | OUTPATIENT
Start: 2017-08-10 | End: 2017-10-09 | Stop reason: ALTCHOICE

## 2017-08-10 NOTE — ED PROVIDER NOTES
"Encounter Date: 8/9/2017       History     Chief Complaint   Patient presents with    Allergic Reaction     onset approx 1200, she believes that  her reaction is from the tomato sauce from spaghetti Os but she is not sure, she did not take any medications for symptoms     The history is provided by the patient.   Allergic Reaction   The primary symptoms are  rash (on chest). The primary symptoms do not include wheezing, shortness of breath, cough, abdominal pain, nausea, vomiting, diarrhea, dizziness, palpitations, altered mental status, angioedema or urticaria. The current episode started today (began about 11 hours ago after pt ate some spaghetti with red sauce.  noticed macular rash on upper chest and generalized itching.  ). The problem has been gradually worsening.   The rash began today. The rash appears on the chest. The rash is associated with itching. The rash is not associated with blisters.   The onset of the reaction was associated with eating. Significant symptoms also include itching. Significant symptoms that are not present include eye redness, flushing or rhinorrhea.     Review of patient's allergies indicates:   Allergen Reactions    Shellfish containing products Rash    Penicillins      Past Medical History:   Diagnosis Date    Hypertension     Obesity     Psoriasis     Seizures     Stroke      Past Surgical History:   Procedure Laterality Date    APPENDECTOMY      TOE AMPUTATION      Right great toe amputated due to infection from "hangnail"      Family History   Problem Relation Age of Onset    Cancer Neg Hx      Social History   Substance Use Topics    Smoking status: Never Smoker    Smokeless tobacco: Never Used    Alcohol use No     Review of Systems   Constitutional: Negative for fever.   HENT: Negative for rhinorrhea and sore throat.    Eyes: Negative for redness.   Respiratory: Negative for cough, shortness of breath and wheezing.    Cardiovascular: Negative for chest pain " and palpitations.   Gastrointestinal: Negative for abdominal pain, diarrhea, nausea and vomiting.   Genitourinary: Negative for dysuria.   Musculoskeletal: Negative for back pain.   Skin: Positive for itching and rash (on chest). Negative for flushing.   Neurological: Negative for dizziness and weakness.   Hematological: Does not bruise/bleed easily.   All other systems reviewed and are negative.      Physical Exam     Initial Vitals [08/09/17 2227]   BP Pulse Resp Temp SpO2   (!) 146/74 80 20 98.3 °F (36.8 °C) 98 %      MAP       98         Physical Exam    Nursing note and vitals reviewed.  Constitutional: She appears well-developed and well-nourished.   HENT:   Head: Normocephalic and atraumatic.   Mouth/Throat: No oropharyngeal exudate.   Eyes: Conjunctivae and EOM are normal. Pupils are equal, round, and reactive to light.   Neck: Normal range of motion. Neck supple. No thyromegaly present.   Cardiovascular: Normal rate, regular rhythm, normal heart sounds and intact distal pulses. Exam reveals no gallop and no friction rub.    No murmur heard.  Pulmonary/Chest: Breath sounds normal. No respiratory distress. She has no wheezes. She has no rhonchi. She exhibits no tenderness.   Abdominal: Soft. Bowel sounds are normal. She exhibits no distension. There is no tenderness. There is no rebound and no guarding.   Musculoskeletal: Normal range of motion. She exhibits no edema or tenderness.   Lymphadenopathy:     She has no cervical adenopathy.   Neurological: She is alert and oriented to person, place, and time. She has normal strength. No cranial nerve deficit or sensory deficit.   Skin: Skin is warm, dry and intact. Capillary refill takes less than 2 seconds. Rash (pt also has plaque psoriasis, which is chronic.  no acute changes of this rash.  ) noted. Rash is macular. No cyanosis. Nails show no clubbing.        Psychiatric: She has a normal mood and affect. Her behavior is normal. Judgment and thought content  "normal.         ED Course   Procedures  Labs Reviewed - No data to display       Vitals:    08/09/17 2227 08/10/17 0021   BP: (!) 146/74 135/82   Pulse: 80 69   Resp: 20 18   Temp: 98.3 °F (36.8 °C) 97.6 °F (36.4 °C)   TempSrc: Oral Oral   SpO2: 98%    Weight: (!) 137.9 kg (304 lb)    Height: 5' 6" (1.676 m)            Imaging Results    None         Medications   famotidine tablet 20 mg (20 mg Oral Given 8/9/17 2305)   diphenhydrAMINE capsule 25 mg (25 mg Oral Given 8/9/17 2306)   methylPREDNISolone sod suc(PF) injection 125 mg (125 mg Intramuscular Given 8/9/17 2306)       12:14 AM - Re-evaluation: The patient is resting comfortably and is in no acute distress.  Pt states pruritis has improved as well as some edema of chest rash. She states that her symptoms have improved after treatment within ER. Discussed test results, shared treatment plan, specific conditions for return, and importance of follow up with patient and family.  No issues with airway.  She understands and agrees with the plan as discussed. Answered  her questions at this time. She has remained hemodynamically stable throughout the ED course and is appropriate for discharge home.     Patient is safe for discharge. There is no suggestion of airway or ENT emergency. Patient is hemodynamically stable and there is no suggestion of active anaphylaxis or progressive worsening of current symptoms.    Pre-hypertension/Hypertension: The pt has been informed that they may have pre-hypertension or hypertension based on a blood pressure reading in the ED. I recommend that the pt call the PCP listed on their discharge instructions or a physician of their choice this week to arrange f/u for further evaluation of possible pre-hypertension or hypertension.     Denise Barron was given a handout which discussed their disease process, precautions, and instructions for follow-up and therapy.    Follow-up Information     Kenji Evans MD. Schedule an " appointment as soon as possible for a visit in 1 week.    Specialty:  Family Medicine  Contact information:  [id number] [facility]  [facility] [address]  [phone]             [facility].    Specialty:  Emergency Medicine  Why:  As needed, If symptoms worsen  Contact information:  [address] [address] [address] [phone]                 Discharge Medication List as of 8/10/2017 12:14 AM      START taking these medications    Details   famotidine (PEPCID) 20 MG tablet Take 1 tablet (20 mg total) by mouth 2 (two) times daily., Starting Thu 8/10/2017, Until Fri 8/10/2018, Print      methylPREDNISolone (MEDROL DOSEPACK) 4 mg tablet Take medrol dose pack as directed, Print                ED Diagnosis  1. Allergy to food                             ED Course     Clinical Impression:   The encounter diagnosis was Allergy to food.    Disposition:   Disposition: Discharged  Condition: Stable                        Kevyn Phillips Jr., MD  08/10/17 0035

## 2017-08-31 ENCOUNTER — HOSPITAL ENCOUNTER (EMERGENCY)
Facility: HOSPITAL | Age: 32
Discharge: HOME | End: 2017-08-31
Attending: EMERGENCY MEDICINE
Payer: COMMERCIAL

## 2017-08-31 VITALS
OXYGEN SATURATION: 99 % | HEART RATE: 68 BPM | SYSTOLIC BLOOD PRESSURE: 142 MMHG | WEIGHT: 293 LBS | DIASTOLIC BLOOD PRESSURE: 88 MMHG | BODY MASS INDEX: 47.09 KG/M2 | TEMPERATURE: 98 F | RESPIRATION RATE: 18 BRPM | HEIGHT: 66 IN

## 2017-08-31 DIAGNOSIS — T16.1XXA FOREIGN BODY IN RIGHT EAR, INITIAL ENCOUNTER: Primary | ICD-10-CM

## 2017-08-31 PROCEDURE — 99283 EMERGENCY DEPT VISIT LOW MDM: CPT

## 2017-08-31 RX ORDER — NAPROXEN 500 MG/1
500 TABLET ORAL 2 TIMES DAILY WITH MEALS
Qty: 20 TABLET | Refills: 0 | Status: SHIPPED | OUTPATIENT
Start: 2017-08-31 | End: 2017-10-09

## 2017-08-31 RX ORDER — NEOMYCIN SULFATE, POLYMYXIN B SULFATE AND HYDROCORTISONE 10; 3.5; 1 MG/ML; MG/ML; [USP'U]/ML
4 SUSPENSION/ DROPS AURICULAR (OTIC) 3 TIMES DAILY
Qty: 10 ML | Refills: 0 | Status: SHIPPED | OUTPATIENT
Start: 2017-08-31 | End: 2017-09-07

## 2017-08-31 NOTE — ED PROVIDER NOTES
"Encounter Date: 8/31/2017       History     Chief Complaint   Patient presents with    Foreign Body in Ear     Reports that cotton from a q tip is stuck in her R ear     The history is provided by the patient.   Foreign Body in Ear   This is a new problem. The current episode started more than 1 week ago. The problem occurs constantly. The problem has not changed since onset.Pertinent negatives include no chest pain, no abdominal pain, no headaches and no shortness of breath. Nothing aggravates the symptoms. Nothing relieves the symptoms. She has tried nothing for the symptoms. The treatment provided no relief.   Pt reports Qtip in ear       Review of patient's allergies indicates:   Allergen Reactions    Shellfish containing products Rash    Penicillins      Past Medical History:   Diagnosis Date    Hypertension     Obesity     Psoriasis     Seizures     Stroke      Past Surgical History:   Procedure Laterality Date    APPENDECTOMY      TOE AMPUTATION      Right great toe amputated due to infection from "hangnail"      Family History   Problem Relation Age of Onset    Cancer Neg Hx      Social History   Substance Use Topics    Smoking status: Never Smoker    Smokeless tobacco: Never Used    Alcohol use No     Review of Systems   HENT: Positive for ear pain.    Respiratory: Negative for shortness of breath.    Cardiovascular: Negative for chest pain.   Gastrointestinal: Negative for abdominal pain.   Neurological: Negative for headaches.   All other systems reviewed and are negative.      Physical Exam     Initial Vitals [08/31/17 0049]   BP Pulse Resp Temp SpO2   (!) 166/104 65 19 97.9 °F (36.6 °C) 100 %      MAP       124.67         Physical Exam    Nursing note and vitals reviewed.  Constitutional: She appears well-developed and well-nourished.   HENT:   Head: Normocephalic and atraumatic.   Right Ear: There is tenderness. A foreign body is present.   Mouth/Throat: Oropharynx is clear and moist. "   Eyes: EOM are normal. Pupils are equal, round, and reactive to light.   Neck: Normal range of motion. Neck supple.   Cardiovascular: Normal rate and regular rhythm.   Pulmonary/Chest: Breath sounds normal.   Abdominal: Soft. Bowel sounds are normal.   Musculoskeletal: Normal range of motion.   Neurological: She is alert and oriented to person, place, and time.   Skin: Skin is warm and dry.   Psychiatric: She has a normal mood and affect. Thought content normal.         ED Course   Procedures  Labs Reviewed - No data to display     Pt with retained FB in R canal, will refer to ENT for removal.   1:02 AM - Counseling: Spoke with the patient and discussed todays findings, in addition to providing specific details for the plan of care and counseling regarding the diagnosis and prognosis. Questions are answered at this time. Pt verbalizes understanding of need to follow up with ENT for removal of fb                         ED Course     Clinical Impression:   The encounter diagnosis was Foreign body in right ear, initial encounter.    Disposition:   Disposition: Discharged  Condition: Stable                        Harry Ochoa MD  08/31/17 0106

## 2017-09-25 RX ORDER — CIPROFLOXACIN HYDROCHLORIDE AND HYDROCORTISONE 2; 10 MG/ML; MG/ML
SUSPENSION AURICULAR (OTIC)
Qty: 10 ML | Refills: 0 | OUTPATIENT
Start: 2017-09-25

## 2017-09-29 ENCOUNTER — HOSPITAL ENCOUNTER (EMERGENCY)
Facility: HOSPITAL | Age: 32
Discharge: HOME | End: 2017-09-29
Attending: EMERGENCY MEDICINE
Payer: COMMERCIAL

## 2017-09-29 VITALS
RESPIRATION RATE: 18 BRPM | BODY MASS INDEX: 45 KG/M2 | OXYGEN SATURATION: 97 % | TEMPERATURE: 99 F | HEIGHT: 66 IN | HEART RATE: 98 BPM | DIASTOLIC BLOOD PRESSURE: 101 MMHG | SYSTOLIC BLOOD PRESSURE: 139 MMHG | WEIGHT: 280 LBS

## 2017-09-29 DIAGNOSIS — H10.9 CONJUNCTIVITIS OF BOTH EYES, UNSPECIFIED CONJUNCTIVITIS TYPE: Primary | ICD-10-CM

## 2017-09-29 DIAGNOSIS — H60.333 ACUTE SWIMMER'S EAR OF BOTH SIDES: ICD-10-CM

## 2017-09-29 PROCEDURE — 99283 EMERGENCY DEPT VISIT LOW MDM: CPT

## 2017-09-29 RX ORDER — NEOMYCIN SULFATE, POLYMYXIN B SULFATE AND HYDROCORTISONE 10; 3.5; 1 MG/ML; MG/ML; [USP'U]/ML
4 SUSPENSION/ DROPS AURICULAR (OTIC) 3 TIMES DAILY
Qty: 10 ML | Refills: 0 | Status: SHIPPED | OUTPATIENT
Start: 2017-09-29 | End: 2017-10-09

## 2017-09-29 RX ORDER — ERYTHROMYCIN 5 MG/G
OINTMENT OPHTHALMIC EVERY 4 HOURS
Qty: 1 TUBE | Refills: 0 | Status: SHIPPED | OUTPATIENT
Start: 2017-09-29 | End: 2017-12-23

## 2017-09-29 NOTE — ED PROVIDER NOTES
"Encounter Date: 9/29/2017       History     Chief Complaint   Patient presents with    Eye Drainage     Right eye drainage x 2 days. Pt also requesting medication for itching due to psoriasis.      The history is provided by the patient.   Conjunctivitis    The current episode started two days ago. The problem occurs occasionally. The problem has been unchanged. The problem is moderate. Nothing relieves the symptoms. Nothing aggravates the symptoms. Associated symptoms include ear discharge, ear pain, eye discharge and eye pain. Pertinent negatives include no fever, no nausea, no sore throat, no stridor, no swollen glands and no rash.     Review of patient's allergies indicates:   Allergen Reactions    Shellfish containing products Rash    Penicillins      Past Medical History:   Diagnosis Date    Hypertension     Obesity     Psoriasis     Seizures     Stroke      Past Surgical History:   Procedure Laterality Date    APPENDECTOMY      TOE AMPUTATION      Right great toe amputated due to infection from "hangnail"      Family History   Problem Relation Age of Onset    Cancer Neg Hx      Social History   Substance Use Topics    Smoking status: Never Smoker    Smokeless tobacco: Never Used    Alcohol use No     Review of Systems   Constitutional: Negative for fever.   HENT: Positive for ear discharge and ear pain. Negative for sore throat.    Eyes: Positive for pain and discharge.   Respiratory: Negative for shortness of breath and stridor.    Cardiovascular: Negative for chest pain.   Gastrointestinal: Negative for nausea.   Genitourinary: Negative for dysuria.   Musculoskeletal: Negative for back pain.   Skin: Negative for rash.   Neurological: Negative for weakness.   Hematological: Does not bruise/bleed easily.       Physical Exam     Initial Vitals [09/29/17 1024]   BP Pulse Resp Temp SpO2   (!) 139/101 98 18 98.7 °F (37.1 °C) 97 %      MAP       113.67         Physical Exam    Nursing note and vitals " reviewed.  Constitutional: She appears well-developed and well-nourished. No distress.   HENT:   Head: Normocephalic and atraumatic.   Right Ear: There is drainage and swelling. No tenderness. No mastoid tenderness. No middle ear effusion. No hemotympanum.   Left Ear: There is drainage and swelling. No tenderness. No mastoid tenderness.  No middle ear effusion.   Mouth/Throat: Oropharynx is clear and moist.   Eyes: EOM are normal. Pupils are equal, round, and reactive to light. Right eye exhibits discharge. Left eye exhibits discharge. Right conjunctiva is injected. Left conjunctiva is injected.   Neck: Normal range of motion. Neck supple.   Cardiovascular: Normal rate, regular rhythm and normal heart sounds. Exam reveals no gallop and no friction rub.    No murmur heard.  Pulmonary/Chest: Breath sounds normal. No respiratory distress. She has no wheezes. She has no rhonchi. She has no rales.   Abdominal: Soft. Bowel sounds are normal. She exhibits no distension and no mass. There is no tenderness. There is no rebound and no guarding.   Musculoskeletal: Normal range of motion. She exhibits no edema or tenderness.   Neurological: She is alert and oriented to person, place, and time. She has normal strength.   Skin: Skin is warm and dry. No rash noted.   Psychiatric: She has a normal mood and affect. Thought content normal.         ED Course   Procedures  Labs Reviewed - No data to display                            ED Course      Clinical Impression:       ICD-10-CM ICD-9-CM   1. Conjunctivitis of both eyes, unspecified conjunctivitis type H10.9 372.30   2. Acute swimmer's ear of both sides H60.333 380.12         Disposition:   Disposition: Discharged  Condition: Stable                        Jaret House MD  09/29/17 1035

## 2017-10-09 ENCOUNTER — HOSPITAL ENCOUNTER (EMERGENCY)
Facility: HOSPITAL | Age: 32
Discharge: HOME | End: 2017-10-10
Attending: EMERGENCY MEDICINE
Payer: COMMERCIAL

## 2017-10-09 VITALS
OXYGEN SATURATION: 100 % | WEIGHT: 293 LBS | HEIGHT: 66 IN | SYSTOLIC BLOOD PRESSURE: 160 MMHG | RESPIRATION RATE: 20 BRPM | DIASTOLIC BLOOD PRESSURE: 87 MMHG | TEMPERATURE: 99 F | HEART RATE: 108 BPM | BODY MASS INDEX: 47.09 KG/M2

## 2017-10-09 DIAGNOSIS — R56.9 SEIZURE: Primary | ICD-10-CM

## 2017-10-09 LAB
ANION GAP SERPL CALC-SCNC: 10 MMOL/L
BUN SERPL-MCNC: 9 MG/DL
CALCIUM SERPL-MCNC: 9.4 MG/DL
CHLORIDE SERPL-SCNC: 105 MMOL/L
CO2 SERPL-SCNC: 25 MMOL/L
CREAT SERPL-MCNC: 0.8 MG/DL
EST. GFR  (AFRICAN AMERICAN): >60 ML/MIN/1.73 M^2
EST. GFR  (NON AFRICAN AMERICAN): >60 ML/MIN/1.73 M^2
GLUCOSE SERPL-MCNC: 90 MG/DL
POTASSIUM SERPL-SCNC: 4.1 MMOL/L
SODIUM SERPL-SCNC: 140 MMOL/L

## 2017-10-09 PROCEDURE — 80048 BASIC METABOLIC PNL TOTAL CA: CPT

## 2017-10-09 PROCEDURE — 99283 EMERGENCY DEPT VISIT LOW MDM: CPT

## 2017-10-10 NOTE — ED NOTES
Patient Dc'd home. No seizure activity during er visit. Patient ok to be Dc'd home per Md. Patient amb out in NAD.

## 2017-10-10 NOTE — ED PROVIDER NOTES
"Encounter Date: 10/9/2017       History     Chief Complaint   Patient presents with    Seizures     Pt states, "I had a seizure." Onset approx 1 hour pta. Witnessed by spouse. hx of seizures     The history is provided by the patient.   Seizures    This is a chronic problem. The current episode started just prior to arrival. The problem has been resolved. There was 1 seizure. The most recent episode lasted 30 to 120 seconds. Pertinent negatives include no chest pain. Characteristics include rhythmic jerking. The episode was witnessed. The seizures did not continue in the ED. The seizure(s) had no focality. Possible causes include missed seizure meds. Associated symptoms comments: Patient was mildly confused after the seizure but is at baseline at this time..     Review of patient's allergies indicates:   Allergen Reactions    Shellfish containing products Rash    Penicillins      Past Medical History:   Diagnosis Date    Hypertension     Obesity     Psoriasis     Seizures     Stroke      Past Surgical History:   Procedure Laterality Date    APPENDECTOMY      TOE AMPUTATION      Right great toe amputated due to infection from "hangnail"      Family History   Problem Relation Age of Onset    Cancer Neg Hx      Social History   Substance Use Topics    Smoking status: Never Smoker    Smokeless tobacco: Never Used    Alcohol use No     Review of Systems   HENT: Negative for drooling, trouble swallowing and voice change.    Respiratory: Positive for shortness of breath. Negative for chest tightness.    Cardiovascular: Negative for chest pain and palpitations.   Neurological: Positive for seizures. Negative for dizziness, syncope and light-headedness.   All other systems reviewed and are negative.      Physical Exam     Initial Vitals [10/09/17 2258]   BP Pulse Resp Temp SpO2   (!) 160/87 108 20 98.9 °F (37.2 °C) 100 %      MAP       111.33         Physical Exam    Nursing note and vitals " "reviewed.  Constitutional: She appears well-developed.   HENT:   Head: Normocephalic and atraumatic.   Eyes: EOM are normal. Pupils are equal, round, and reactive to light.   Neck: Normal range of motion. No tracheal deviation present. No JVD present.   Cardiovascular: Normal rate, regular rhythm, normal heart sounds and intact distal pulses. Exam reveals no friction rub.    No murmur heard.  Pulmonary/Chest: Breath sounds normal. No stridor. No respiratory distress. She has no wheezes. She has no rhonchi. She has no rales.   Abdominal: Soft. She exhibits no distension. There is no tenderness. There is no rebound and no guarding.   Musculoskeletal: Normal range of motion. She exhibits no edema or tenderness.   Neurological: She is alert and oriented to person, place, and time. She has normal strength. No cranial nerve deficit or sensory deficit.   Skin: Skin is warm and dry.   Psychiatric: She has a normal mood and affect. Her behavior is normal. Judgment and thought content normal.         ED Course   Procedures  Labs Reviewed   BASIC METABOLIC PANEL                               ED Course      ED ONGOING VITALS:  Vitals:    10/09/17 2258   BP: (!) 160/87   Pulse: 108   Resp: 20   Temp: 98.9 °F (37.2 °C)   TempSrc: Oral   SpO2: 100%   Weight: (!) 139.3 kg (307 lb)   Height: 5' 6" (1.676 m)         ABNORMAL LAB VALUES:  Labs Reviewed   BASIC METABOLIC PANEL         ALL LAB VALUES:  Results for orders placed or performed during the hospital encounter of 10/09/17   Basic metabolic panel   Result Value Ref Range    Sodium 140 136 - 145 mmol/L    Potassium 4.1 3.5 - 5.1 mmol/L    Chloride 105 95 - 110 mmol/L    CO2 25 23 - 29 mmol/L    Glucose 90 70 - 110 mg/dL    BUN, Bld 9 6 - 20 mg/dL    Creatinine 0.8 0.5 - 1.4 mg/dL    Calcium 9.4 8.7 - 10.5 mg/dL    Anion Gap 10 8 - 16 mmol/L    eGFR if African American >60.0 >60 mL/min/1.73 m^2    eGFR if non African American >60.0 >60 mL/min/1.73 m^2         RADIOLOGY " STUDIES:  Imaging Results    None           The above vital signs and test results have been reviewed by the emergency provider.       ED MEDICATIONS:  Medications - No data to display        ED EVENTS:      11:50 PM RE-EVALUATION & DISPOSITION:   Reassessment at the time of disposition demonstrates that the patient is resting comfortably in no acute distress.  She has remained hemodynamically stable throughout the entire ED visit and is without objective evidence for acute process requiring urgent intervention or hospitalization. I discussed test results and provided counseling to patient with regard to condition, the treatment plan, specific conditions for return, and the importance of follow up.  Answered questions at this time. The patient is stable for discharge. Patient has been instructed not to drive until follow up with PCP or neurologist.    New Prescriptions    No medications on file      Clinical Impression:       ICD-10-CM ICD-9-CM   1. Seizure R56.9 780.39     Disposition:   Disposition: Discharged  Condition: Stable                        Viet Curry MD  10/09/17 5371

## 2017-10-10 NOTE — ED NOTES
Patient to ER for a break through seizure. She has had them before and her neurologist changed her medication from Dilantin and he changed to Lamictal. She think she missed her medication. Patient at her normal.BUE  strong and equal. AAOX4. amb with steady gait. BBSCTA, + Eczema/ Psoriasis flakes noted to skin. Will con't to monitor.

## 2017-12-23 ENCOUNTER — HOSPITAL ENCOUNTER (EMERGENCY)
Facility: HOSPITAL | Age: 32
Discharge: HOME | End: 2017-12-23
Attending: EMERGENCY MEDICINE
Payer: COMMERCIAL

## 2017-12-23 VITALS
WEIGHT: 293 LBS | RESPIRATION RATE: 18 BRPM | HEART RATE: 87 BPM | HEIGHT: 66 IN | DIASTOLIC BLOOD PRESSURE: 87 MMHG | BODY MASS INDEX: 47.09 KG/M2 | SYSTOLIC BLOOD PRESSURE: 133 MMHG | OXYGEN SATURATION: 100 % | TEMPERATURE: 98 F

## 2017-12-23 DIAGNOSIS — K13.0 CELLULITIS OF LIP: ICD-10-CM

## 2017-12-23 DIAGNOSIS — B00.9 HERPES SIMPLEX: Primary | ICD-10-CM

## 2017-12-23 PROCEDURE — 25000003 PHARM REV CODE 250: Performed by: NURSE PRACTITIONER

## 2017-12-23 PROCEDURE — 99283 EMERGENCY DEPT VISIT LOW MDM: CPT

## 2017-12-23 RX ORDER — CLINDAMYCIN HYDROCHLORIDE 300 MG/1
300 CAPSULE ORAL EVERY 8 HOURS
Qty: 30 CAPSULE | Refills: 0 | Status: SHIPPED | OUTPATIENT
Start: 2017-12-23 | End: 2018-01-02

## 2017-12-23 RX ORDER — HYDROCODONE BITARTRATE AND ACETAMINOPHEN 10; 325 MG/1; MG/1
1 TABLET ORAL
Status: COMPLETED | OUTPATIENT
Start: 2017-12-23 | End: 2017-12-23

## 2017-12-23 RX ORDER — CLINDAMYCIN HYDROCHLORIDE 150 MG/1
300 CAPSULE ORAL
Status: COMPLETED | OUTPATIENT
Start: 2017-12-23 | End: 2017-12-23

## 2017-12-23 RX ORDER — MUPIROCIN 20 MG/G
OINTMENT TOPICAL
Qty: 22 G | Refills: 0 | Status: SHIPPED | OUTPATIENT
Start: 2017-12-23 | End: 2018-07-27

## 2017-12-23 RX ORDER — VALACYCLOVIR HYDROCHLORIDE 1 G/1
TABLET, FILM COATED ORAL
Qty: 30 TABLET | Refills: 0 | Status: SHIPPED | OUTPATIENT
Start: 2017-12-23 | End: 2018-07-23 | Stop reason: RX

## 2017-12-23 RX ORDER — ACETAMINOPHEN AND CODEINE PHOSPHATE 300; 30 MG/1; MG/1
1-2 TABLET ORAL EVERY 6 HOURS PRN
Qty: 15 TABLET | Refills: 0 | Status: SHIPPED | OUTPATIENT
Start: 2017-12-23 | End: 2018-01-02

## 2017-12-23 RX ADMIN — HYDROCODONE BITARTRATE AND ACETAMINOPHEN 1 TABLET: 10; 325 TABLET ORAL at 05:12

## 2017-12-23 RX ADMIN — CLINDAMYCIN HYDROCHLORIDE 300 MG: 150 CAPSULE ORAL at 05:12

## 2017-12-23 NOTE — ED NOTES
LOC: The patient is awake, alert and aware of environment with an appropriate affect, the patient is oriented x 3 and speaking appropriately.  APPEARANCE: Patient resting comfortably and in no acute distress, patient is clean and well groomed, patient's clothing is properly fastened.  HEENT: Brief WNL  SKIN: Brief WNL. Except abscess/wound noted to right lower lip  MUSCULOSKELETAL: Brief WNL  RESPIRATORY: Brief WNL  CARDIAC: Brief WNL  GASTRO: Brief WNL  : Brief WNL  Peripheral Vasc: Brief WNL  NEURO: Brief WNL  PSYCH: Brief WNL

## 2018-01-01 NOTE — ED PROVIDER NOTES
"Encounter Date: 12/23/2017       History     Chief Complaint   Patient presents with    Wound Check     right lower lip swelling, history of fever blisters, denies swelling, itching, tingling on toungue or mouth, denies SOB, reports drainage yellow from wound on lip       Abscess    This is a new problem. The current episode started two days ago. The problem has been rapidly worsening. The abscess is present on the lips (right lower lip). The pain is at a severity of 8/10. The abscess is characterized by burning, swelling, painfulness, redness and draining. Pertinent negatives include no anorexia, not drinking less, no fever, no diarrhea, no vomiting, no congestion, no rhinorrhea, no sore throat and no cough. Her past medical history does not include skin abscesses in family. There were no sick contacts. She has received no recent medical care.         PCP:   Kenji Evans MD        Review of patient's allergies indicates:   Allergen Reactions    Shellfish containing products Rash    Penicillins      Past Medical History:   Diagnosis Date    Hypertension     Obesity     Psoriasis     Seizures     Stroke      Past Surgical History:   Procedure Laterality Date    APPENDECTOMY      TOE AMPUTATION      Right great toe amputated due to infection from "hangnail"      Family History   Problem Relation Age of Onset    Cancer Neg Hx      Social History   Substance Use Topics    Smoking status: Never Smoker    Smokeless tobacco: Never Used    Alcohol use No     Review of Systems   Constitutional: Negative for fever.   HENT: Negative for congestion, rhinorrhea and sore throat.    Respiratory: Negative for cough and shortness of breath.    Cardiovascular: Negative for chest pain.   Gastrointestinal: Negative for anorexia, diarrhea, nausea and vomiting.   Genitourinary: Negative for dysuria.   Musculoskeletal: Negative for back pain.   Skin: Negative for rash.        Positive for fever blister and abscess of the " "right lower lip.    Neurological: Negative for weakness.   Hematological: Does not bruise/bleed easily.       Physical Exam     Initial Vitals [12/23/17 1504]   BP Pulse Resp Temp SpO2   (!) 147/86 93 18 99.1 °F (37.3 °C) 100 %      MAP       106.33         Physical Exam    Nursing note and vitals reviewed.  Constitutional: She appears well-developed and well-nourished. She is Obese . She is cooperative. She does not appear ill. No distress.   HENT:   Head: Normocephalic and atraumatic.   Nose: Nose normal.   Mouth/Throat: Uvula is midline, oropharynx is clear and moist and mucous membranes are normal.   Fever blister and erythema noted to the right lower lip with moderate swelling. Scant amount of drainage present from blister.  No induration or streaking erythema present.  Patient reports having a history of "fever blisters".  Airway patent.     Eyes: Conjunctivae, EOM and lids are normal. Pupils are equal, round, and reactive to light.   Neck: Trachea normal and normal range of motion. Neck supple.   Cardiovascular: Normal rate, regular rhythm, intact distal pulses and normal pulses.   Pulmonary/Chest: Effort normal and breath sounds normal. No respiratory distress. She has no wheezes. She has no rhonchi. She has no rales.   Abdominal: Soft. She exhibits no distension and no mass. There is no tenderness. There is no rebound and no guarding.   Musculoskeletal: Normal range of motion. She exhibits no edema or tenderness.   Neurological: She is alert and oriented to person, place, and time. She has normal strength. GCS eye subscore is 4. GCS verbal subscore is 5. GCS motor subscore is 6.   Skin: Skin is warm, dry and intact. Capillary refill takes less than 2 seconds. No rash noted.   Psychiatric: She has a normal mood and affect. Her speech is normal and behavior is normal. Thought content normal.         ED Course   Procedures    ED Medications:   Medications   clindamycin capsule 300 mg (300 mg Oral Given " "12/23/17 1730)   hydrocodone-acetaminophen 10-325mg per tablet 1 tablet (1 tablet Oral Given 12/23/17 1729)         ED Course Vitals  Vitals:    12/23/17 1504 12/23/17 1746   BP: (!) 147/86 133/87   BP Location: Left arm Right arm   Patient Position: Sitting Sitting   Pulse: 93 87   Resp: 18    Temp: 99.1 °F (37.3 °C) 98.2 °F (36.8 °C)   TempSrc: Oral    SpO2: 100% 100%   Weight: 134.4 kg (296 lb 3.2 oz)    Height: 5' 6" (1.676 m)          1730 HOURS RE-EVALUATION & DISPOSITION:   Reassessment at the time of disposition demonstrates that the patient is resting comfortably in no acute distress.  She has remained hemodynamically stable throughout the entire ED visit and is without objective evidence for acute process requiring urgent intervention or hospitalization. I provided counseling to patient with regard to condition, the treatment plan, specific conditions for return, and the importance of follow up.  Answered questions at this time. The patient is stable for discharge.                 Medical Decision Making:   History:   Old Records Summarized: records from clinic visits.  Other:   I have discussed this case with another health care provider.       <> Summary of the Discussion:   Attending Physician:  Nick Emanuel MD                     Clinical Impression:       ICD-10-CM ICD-9-CM   1. Herpes simplex B00.9 054.9   2. Cellulitis of lip K13.0 528.5         Disposition:   Disposition: Discharged  Condition: Stable  I discussed with patient that the evaluation in the emergency department does not suggest any emergent or life threatening medical condition requiring immediate intervention beyond what was provided in the ED, and I believe patient is safe for discharge.  Regardless, an unremarkable evaluation in the ED does not preclude the development or presence of a serious of life threatening condition. As such, patient was instructed to return immediately for any worsening or change in current symptoms. I " also discussed the results of my evaluation and diagnosis with patient and she concurs with the evaluation and management plan.  Detailed written and verbal instructions provided to patient and she expressed a verbal understanding of the discharge instructions and overall management plan. Reiterated the importance of medication administration and safety and advised patient to follow up with primary care provider in 3-5 days or sooner if needed.  Also advised patient to return to the ER for any complications.     For  CELLULITIS/ABSCESS, I advised patient to: keep area clean and dry; apply antibiotic ointment as prescribed; refrain from squeezing or irritating site; apply moist heat to help with pain and abscess drainage; and to take antibiotics as prescribed. Patient was instructed to follow up with primary care provider, urgent care facility, or the emergency room if symptoms worsen and they develop a fever greater than 102.5 °F, have pain unrelieved by OTC or prescription medications, and excessive swelling. Also instructed patient to follow up with provider in 24-48 hours for wound re-check and possible packing change.      Regarding HERPES SIMPLEX, I discussed use of medication with patient and advised patient of possible side effects including: fatigue, headache, nausea/vomiting, rash, and tremors.  I instructed patient to: NOT wear nylon or synthetic pantyhose, underwear, or pants; wear only loose-fitting cotton garments; cleanse area gently with soap and water; and take warm baths to help relieve pain. I also advised patient on prevention: avoid all sexual contact, including oral sex, during outbreak; use latex condoms during any and all sexual contact even if any sores or blisters are present.            Discharge Medication List as of 12/23/2017  5:39 PM      START taking these medications    Details   acetaminophen-codeine 300-30mg (TYLENOL #3) 300-30 mg Tab Take 1-2 tablets by mouth every 6 (six) hours as  needed., Starting Sat 12/23/2017, Until Tue 1/2/2018, Print      clindamycin (CLEOCIN) 300 MG capsule Take 1 capsule (300 mg total) by mouth every 8 (eight) hours., Starting Sat 12/23/2017, Until Tue 1/2/2018, Print      mupirocin (BACTROBAN) 2 % ointment Apply topically to affected area three times daily., Print      valACYclovir (VALTREX) 1000 MG tablet Take 2 grams by mouth q12 hours for one day.  Then take 1g every 8 hours until symptoms resolve., Print             Follow-up Information     Schedule an appointment as soon as possible for a visit  with Kenji Evans MD.    Specialty:  Family Medicine  Contact information:  [address] [facility]  [facility] [address]  [phone]                                Ankur Rodrigues, [place]IL[place]  01/01/18 2783

## 2018-03-08 ENCOUNTER — HOSPITAL ENCOUNTER (EMERGENCY)
Facility: HOSPITAL | Age: 33
Discharge: HOME | End: 2018-03-08
Attending: EMERGENCY MEDICINE
Payer: COMMERCIAL

## 2018-03-08 VITALS
HEART RATE: 92 BPM | DIASTOLIC BLOOD PRESSURE: 81 MMHG | SYSTOLIC BLOOD PRESSURE: 146 MMHG | BODY MASS INDEX: 48.26 KG/M2 | RESPIRATION RATE: 18 BRPM | WEIGHT: 293 LBS | OXYGEN SATURATION: 100 % | TEMPERATURE: 99 F

## 2018-03-08 DIAGNOSIS — N30.01 ACUTE CYSTITIS WITH HEMATURIA: Primary | ICD-10-CM

## 2018-03-08 DIAGNOSIS — R07.9 CHEST PAIN: ICD-10-CM

## 2018-03-08 DIAGNOSIS — M94.0 COSTOCHONDRITIS: ICD-10-CM

## 2018-03-08 DIAGNOSIS — J00 ACUTE NASOPHARYNGITIS: ICD-10-CM

## 2018-03-08 DIAGNOSIS — I10 ESSENTIAL HYPERTENSION: ICD-10-CM

## 2018-03-08 LAB
ALBUMIN SERPL BCP-MCNC: 3.4 G/DL
ALP SERPL-CCNC: 78 U/L
ALT SERPL W/O P-5'-P-CCNC: 12 U/L
ANION GAP SERPL CALC-SCNC: 8 MMOL/L
AST SERPL-CCNC: 11 U/L
B-HCG UR QL: NEGATIVE
BACTERIA #/AREA URNS AUTO: ABNORMAL /HPF
BASOPHILS # BLD AUTO: 0.01 K/UL
BASOPHILS NFR BLD: 0.1 %
BILIRUB SERPL-MCNC: 0.2 MG/DL
BILIRUB UR QL STRIP: NEGATIVE
BUN SERPL-MCNC: 11 MG/DL
CALCIUM SERPL-MCNC: 9.5 MG/DL
CHLORIDE SERPL-SCNC: 105 MMOL/L
CLARITY UR REFRACT.AUTO: CLEAR
CO2 SERPL-SCNC: 28 MMOL/L
COLOR UR AUTO: YELLOW
CREAT SERPL-MCNC: 0.8 MG/DL
DIFFERENTIAL METHOD: ABNORMAL
EOSINOPHIL # BLD AUTO: 0 K/UL
EOSINOPHIL NFR BLD: 0 %
ERYTHROCYTE [DISTWIDTH] IN BLOOD BY AUTOMATED COUNT: 18.9 %
EST. GFR  (AFRICAN AMERICAN): >60 ML/MIN/1.73 M^2
EST. GFR  (NON AFRICAN AMERICAN): >60 ML/MIN/1.73 M^2
GLUCOSE SERPL-MCNC: 102 MG/DL
GLUCOSE UR QL STRIP: NEGATIVE
HCT VFR BLD AUTO: 35.3 %
HGB BLD-MCNC: 10.3 G/DL
HGB UR QL STRIP: ABNORMAL
HYALINE CASTS UR QL AUTO: 0 /LPF
INR PPP: 1.1
KETONES UR QL STRIP: NEGATIVE
LEUKOCYTE ESTERASE UR QL STRIP: ABNORMAL
LYMPHOCYTES # BLD AUTO: 1 K/UL
LYMPHOCYTES NFR BLD: 9.2 %
MCH RBC QN AUTO: 19.4 PG
MCHC RBC AUTO-ENTMCNC: 29.2 G/DL
MCV RBC AUTO: 67 FL
MICROSCOPIC COMMENT: ABNORMAL
MONOCYTES # BLD AUTO: 0.3 K/UL
MONOCYTES NFR BLD: 2.7 %
NEUTROPHILS # BLD AUTO: 9.6 K/UL
NEUTROPHILS NFR BLD: 87.6 %
NITRITE UR QL STRIP: NEGATIVE
NON-SQ EPI CELLS #/AREA URNS AUTO: 0 /HPF
PH UR STRIP: 7 [PH] (ref 5–8)
PLATELET # BLD AUTO: 575 K/UL
PMV BLD AUTO: 10.2 FL
POTASSIUM SERPL-SCNC: 4.2 MMOL/L
PROT SERPL-MCNC: 9.6 G/DL
PROT UR QL STRIP: ABNORMAL
PROTHROMBIN TIME: 11.3 SEC
RBC # BLD AUTO: 5.31 M/UL
RBC #/AREA URNS AUTO: 10 /HPF (ref 0–4)
SODIUM SERPL-SCNC: 141 MMOL/L
SP GR UR STRIP: 1.02 (ref 1–1.03)
SQUAMOUS #/AREA URNS AUTO: 25 /HPF
TROPONIN I SERPL DL<=0.01 NG/ML-MCNC: <0.006 NG/ML
URN SPEC COLLECT METH UR: ABNORMAL
UROBILINOGEN UR STRIP-ACNC: NEGATIVE EU/DL
WBC # BLD AUTO: 10.96 K/UL
WBC #/AREA URNS AUTO: 50 /HPF (ref 0–5)
YEAST UR QL AUTO: ABNORMAL

## 2018-03-08 PROCEDURE — 93010 ELECTROCARDIOGRAM REPORT: CPT | Mod: ,,, | Performed by: INTERNAL MEDICINE

## 2018-03-08 PROCEDURE — 81025 URINE PREGNANCY TEST: CPT

## 2018-03-08 PROCEDURE — 80053 COMPREHEN METABOLIC PANEL: CPT

## 2018-03-08 PROCEDURE — 99900035 HC TECH TIME PER 15 MIN (STAT)

## 2018-03-08 PROCEDURE — 85610 PROTHROMBIN TIME: CPT

## 2018-03-08 PROCEDURE — 81000 URINALYSIS NONAUTO W/SCOPE: CPT

## 2018-03-08 PROCEDURE — 25000003 PHARM REV CODE 250: Performed by: NURSE PRACTITIONER

## 2018-03-08 PROCEDURE — 93005 ELECTROCARDIOGRAM TRACING: CPT

## 2018-03-08 PROCEDURE — 99284 EMERGENCY DEPT VISIT MOD MDM: CPT

## 2018-03-08 PROCEDURE — 85025 COMPLETE CBC W/AUTO DIFF WBC: CPT

## 2018-03-08 PROCEDURE — 84484 ASSAY OF TROPONIN QUANT: CPT

## 2018-03-08 RX ORDER — HYDROCODONE BITARTRATE AND ACETAMINOPHEN 10; 325 MG/1; MG/1
1 TABLET ORAL
Status: COMPLETED | OUTPATIENT
Start: 2018-03-08 | End: 2018-03-08

## 2018-03-08 RX ORDER — HYDROCODONE BITARTRATE AND ACETAMINOPHEN 10; 325 MG/1; MG/1
TABLET ORAL
Status: DISPENSED
Start: 2018-03-08 | End: 2018-03-09

## 2018-03-08 RX ORDER — NITROFURANTOIN 25; 75 MG/1; MG/1
100 CAPSULE ORAL 2 TIMES DAILY
Qty: 10 CAPSULE | Refills: 0 | Status: SHIPPED | OUTPATIENT
Start: 2018-03-08 | End: 2018-03-13

## 2018-03-08 RX ORDER — PROMETHAZINE HYDROCHLORIDE AND CODEINE PHOSPHATE 6.25; 1 MG/5ML; MG/5ML
5 SOLUTION ORAL EVERY 4 HOURS PRN
Qty: 180 ML | Refills: 0 | Status: SHIPPED | OUTPATIENT
Start: 2018-03-08 | End: 2018-03-18

## 2018-03-08 RX ORDER — IBUPROFEN 800 MG/1
800 TABLET ORAL EVERY 6 HOURS PRN
Qty: 30 TABLET | Refills: 0 | Status: SHIPPED | OUTPATIENT
Start: 2018-03-08 | End: 2018-07-23 | Stop reason: RX

## 2018-03-08 RX ADMIN — HYDROCODONE BITARTRATE AND ACETAMINOPHEN 1 TABLET: 10; 325 TABLET ORAL at 08:03

## 2018-03-09 NOTE — ED NOTES
Cardiac: s1s2 auscultated; no murmur noted  BBS-clear  Abdomen: soft; non-tender  Skin: multiple psoriatic plaques noted generalized body

## 2018-03-09 NOTE — ED PROVIDER NOTES
Encounter Date: 3/8/2018       History     Chief Complaint   Patient presents with    Chest Pain     reports constant chest pain since noon today; worsens when lying down; pain described as pressure when lying down; also reports non-productive cough     The history is provided by the patient.   Chest Pain   The current episode started today (began approximately six hours ago - around noon today). Chest pain occurs constantly. The chest pain is unchanged. The pain is associated with coughing (also worse with laying down). At its most intense, the chest pain is at 8/10. The chest pain is currently at 7/10. The quality of the pain is described as aching and pressure-like. The pain does not radiate. Chest pain is worsened by certain positions. Primary symptoms include fatigue and cough. Pertinent negatives for primary symptoms include no fever, no syncope, no shortness of breath, no wheezing, no palpitations, no abdominal pain, no nausea, no vomiting, no dizziness and no altered mental status.   The fatigue began today. The fatigue has been unchanged since its onset.   The cough began today. The cough is dry and non-productive.   Pertinent negatives for associated symptoms include no claudication, no diaphoresis, no lower extremity edema, no near-syncope, no numbness, no orthopnea, no paroxysmal nocturnal dyspnea and no weakness. She tried nothing for the symptoms. Risk factors include obesity and lack of exercise.   Her past medical history is significant for hypertension, seizures and strokes.   Pertinent negatives for past medical history include no cancer, no congenital heart disease, no MI and no mitral valve prolapse.   Her family medical history is significant for diabetes, heart disease, hyperlipidemia and hypertension.       PCP:    Kenji Evans MD        Review of patient's allergies indicates:   Allergen Reactions    Shellfish containing products Rash    Penicillins      Past Medical History:  "  Diagnosis Date    Anticoagulant long-term use     Hypertension     Obesity     Psoriasis     Seizures     Stroke      Past Surgical History:   Procedure Laterality Date    APPENDECTOMY      TOE AMPUTATION      Right great toe amputated due to infection from "hangnail"      Family History   Problem Relation Age of Onset    Cancer Neg Hx      Social History   Substance Use Topics    Smoking status: Never Smoker    Smokeless tobacco: Never Used    Alcohol use No     Review of Systems   Constitutional: Positive for fatigue. Negative for appetite change, chills, diaphoresis and fever.   HENT: Positive for congestion. Negative for drooling, ear discharge, ear pain, postnasal drip, sinus pain and sore throat.    Eyes: Negative for discharge, redness and visual disturbance.   Respiratory: Positive for cough and chest tightness. Negative for apnea, choking, shortness of breath, wheezing and stridor.    Cardiovascular: Positive for chest pain ("pressure-like" - also reproducible). Negative for palpitations, orthopnea, claudication, leg swelling, syncope and near-syncope.   Gastrointestinal: Negative for abdominal pain, diarrhea, nausea and vomiting.   Genitourinary: Negative for difficulty urinating, dysuria, flank pain, frequency, hematuria and urgency.   Musculoskeletal: Negative for back pain and myalgias.   Skin: Negative for rash.   Neurological: Positive for seizures. Negative for dizziness, facial asymmetry, speech difficulty, weakness, light-headedness, numbness and headaches.   Hematological: Does not bruise/bleed easily.       Physical Exam     Initial Vitals [03/08/18 1812]   BP Pulse Resp Temp SpO2   (!) 140/98 94 20 98.5 °F (36.9 °C) 100 %      MAP       112         Physical Exam    Nursing note and vitals reviewed.  Constitutional: She appears well-developed and well-nourished. She is Obese . She is cooperative. She does not appear ill. No distress.   HENT:   Head: Normocephalic and atraumatic. "   Right Ear: Hearing, tympanic membrane and ear canal normal.   Left Ear: Hearing, tympanic membrane and ear canal normal.   Nose: Mucosal edema present.   Mouth/Throat: Uvula is midline and mucous membranes are normal. Posterior oropharyngeal erythema present.   Eyes: Conjunctivae, EOM and lids are normal. Pupils are equal, round, and reactive to light.   Neck: Trachea normal and normal range of motion. Neck supple.   Cardiovascular: Normal rate, regular rhythm, intact distal pulses and normal pulses.   Pulmonary/Chest: Effort normal and breath sounds normal. No accessory muscle usage. No respiratory distress. She has no decreased breath sounds. She has no wheezes. She has no rhonchi. She has no rales. She exhibits tenderness (reproducible chest wall tenderness - no crepitus or deformity noted).   Abdominal: Soft. She exhibits no distension and no mass. There is no tenderness. There is no rigidity, no rebound, no guarding and no CVA tenderness.   Musculoskeletal: Normal range of motion. She exhibits no edema or tenderness.   Lymphadenopathy:     She has no cervical adenopathy.   Neurological: She is alert and oriented to person, place, and time. She has normal strength. Gait normal. GCS eye subscore is 4. GCS verbal subscore is 5. GCS motor subscore is 6.   Neurovascular intact to all extremities.    Skin: Skin is warm, dry and intact. Capillary refill takes less than 2 seconds. No rash noted.   Scaly plaques noted diffusely secondary to patient's history of psoriasis.  (She denies any complaints relating to her chronic psoriasis.)   Psychiatric: She has a normal mood and affect. Her speech is normal and behavior is normal. Thought content normal. Cognition and memory are normal.         ED Course   Procedures    ED ONGOING VITALS:  Vitals:    03/08/18 1811 03/08/18 1812 03/08/18 2043   BP:  (!) 140/98 (!) 146/81   Pulse:  94 92   Resp:  20 18   Temp:  98.5 °F (36.9 °C)    TempSrc:  Oral    SpO2:  100%    Weight:  135.6 kg (299 lb)           ABNORMAL LAB VALUES:  Labs Reviewed   CBC W/ AUTO DIFFERENTIAL - Abnormal; Notable for the following:        Result Value    Hemoglobin 10.3 (*)     Hematocrit 35.3 (*)     MCV 67 (*)     MCH 19.4 (*)     MCHC 29.2 (*)     RDW 18.9 (*)     Platelets 575 (*)     Gran # (ANC) 9.6 (*)     Gran% 87.6 (*)     Lymph% 9.2 (*)     Mono% 2.7 (*)     All other components within normal limits   URINALYSIS - Abnormal; Notable for the following:     Protein, UA 1+ (*)     Occult Blood UA 1+ (*)     Leukocytes, UA 1+ (*)     All other components within normal limits   COMPREHENSIVE METABOLIC PANEL - Abnormal; Notable for the following:     Total Protein 9.6 (*)     Albumin 3.4 (*)     All other components within normal limits   URINALYSIS MICROSCOPIC - Abnormal; Notable for the following:     RBC, UA 10 (*)     WBC, UA 50 (*)     Bacteria, UA Many (*)     All other components within normal limits   TROPONIN I   PREGNANCY TEST, URINE RAPID   PROTIME-INR         ALL LAB VALUES:  Results for orders placed or performed during the hospital encounter of 03/08/18   CBC auto differential   Result Value Ref Range    WBC 10.96 3.90 - 12.70 K/uL    RBC 5.31 4.00 - 5.40 M/uL    Hemoglobin 10.3 (L) 12.0 - 16.0 g/dL    Hematocrit 35.3 (L) 37.0 - 48.5 %    MCV 67 (L) 82 - 98 fL    MCH 19.4 (L) 27.0 - 31.0 pg    MCHC 29.2 (L) 32.0 - 36.0 g/dL    RDW 18.9 (H) 11.5 - 14.5 %    Platelets 575 (H) 150 - 350 K/uL    MPV 10.2 9.2 - 12.9 fL    Gran # (ANC) 9.6 (H) 1.8 - 7.7 K/uL    Lymph # 1.0 1.0 - 4.8 K/uL    Mono # 0.3 0.3 - 1.0 K/uL    Eos # 0.0 0.0 - 0.5 K/uL    Baso # 0.01 0.00 - 0.20 K/uL    Gran% 87.6 (H) 38.0 - 73.0 %    Lymph% 9.2 (L) 18.0 - 48.0 %    Mono% 2.7 (L) 4.0 - 15.0 %    Eosinophil% 0.0 0.0 - 8.0 %    Basophil% 0.1 0.0 - 1.9 %    Differential Method Automated    Urinalysis Clean Catch   Result Value Ref Range    Specimen UA Urine, Clean Catch     Color, UA Yellow Yellow, Straw, Ev    Appearance, UA  Clear Clear    pH, UA 7.0 5.0 - 8.0    Specific Gravity, UA 1.020 1.005 - 1.030    Protein, UA 1+ (A) Negative    Glucose, UA Negative Negative    Ketones, UA Negative Negative    Bilirubin (UA) Negative Negative    Occult Blood UA 1+ (A) Negative    Nitrite, UA Negative Negative    Urobilinogen, UA Negative <2.0 EU/dL    Leukocytes, UA 1+ (A) Negative   Troponin I   Result Value Ref Range    Troponin I <0.006 0.000 - 0.026 ng/mL   Comprehensive metabolic panel   Result Value Ref Range    Sodium 141 136 - 145 mmol/L    Potassium 4.2 3.5 - 5.1 mmol/L    Chloride 105 95 - 110 mmol/L    CO2 28 23 - 29 mmol/L    Glucose 102 70 - 110 mg/dL    BUN, Bld 11 6 - 20 mg/dL    Creatinine 0.8 0.5 - 1.4 mg/dL    Calcium 9.5 8.7 - 10.5 mg/dL    Total Protein 9.6 (H) 6.0 - 8.4 g/dL    Albumin 3.4 (L) 3.5 - 5.2 g/dL    Total Bilirubin 0.2 0.1 - 1.0 mg/dL    Alkaline Phosphatase 78 55 - 135 U/L    AST 11 10 - 40 U/L    ALT 12 10 - 44 U/L    Anion Gap 8 8 - 16 mmol/L    eGFR if African American >60.0 >60 mL/min/1.73 m^2    eGFR if non African American >60.0 >60 mL/min/1.73 m^2   Pregnancy, urine rapid (UPT)   Result Value Ref Range    Preg Test, Ur Negative    Protime-INR   Result Value Ref Range    Prothrombin Time 11.3 9.0 - 12.5 sec    INR 1.1 0.8 - 1.2   Urinalysis Microscopic   Result Value Ref Range    RBC, UA 10 (H) 0 - 4 /hpf    WBC, UA 50 (H) 0 - 5 /hpf    Bacteria, UA Many (A) None-Occ /hpf    Yeast, UA None None    Squam Epithel, UA 25 /hpf    Non-Squam Epith 0 <1/hpf /hpf    Hyaline Casts, UA 0 0-1/lpf /lpf    Microscopic Comment SEE COMMENT          The above vital signs and test results have been reviewed by the emergency provider.       ED MEDICATIONS:  Medications   hydrocodone-acetaminophen 10-325mg per tablet 1 tablet (1 tablet Oral Given 3/8/18 2043)           ED EVENTS:    2000 HOURS  RE-EVALUATION:  The patient is resting comfortably and is in no acute distress. Discussed test results and notified of pending labs.  Answered questions at this time.       2035 HOURS RE-EVALUATION & DISPOSITION:   Reassessment at the time of disposition demonstrates that the patient is resting comfortably in no acute distress.  She has remained hemodynamically stable throughout the entire ED visit and is without objective evidence for acute process requiring urgent intervention or hospitalization. I discussed test results and provided counseling to patient with regard to condition, the treatment plan, specific conditions for return, and the importance of follow up.  Answered questions at this time. The patient is stable for discharge.            EKG Readings: (Independently Interpreted)   Initial Reading: No STEMI. Previous EKG: Compared with most recent EKG Previous EKG Date: 09/03/2016. Rhythm: Normal Sinus Rhythm. Heart Rate: 91. Ectopy: No Ectopy. Conduction: Normal. ST Segments: Normal ST Segments. T Waves: Normal. Axis: Normal. Clinical Impression: Normal Sinus Rhythm          Medical Decision Making:   History:   Old Records Summarized: records from clinic visits.  Independently Interpreted Test(s):   I have ordered and independently interpreted EKG Reading(s) - see prior notes  Clinical Tests:   Lab Tests: Ordered and Reviewed  Medical Tests: Ordered and Reviewed                      Clinical Impression:       ICD-10-CM ICD-9-CM   1. Acute cystitis with hematuria N30.01 595.0   2. Non-Cardiac Chest Pain R07.9 786.50   3. Acute nasopharyngitis J00 460   4. Essential hypertension I10 401.9   5. Costochondritis M94.0 733.6         Disposition:   Disposition: Discharged  Condition: Stable  I discussed with patient that the evaluation in the emergency department does not suggest any emergent or life threatening medical condition requiring immediate intervention beyond what was provided in the ED, and I believe patient is safe for discharge.  Regardless, an unremarkable evaluation in the ED does not preclude the development or presence of a  serious of life threatening condition. As such, patient was instructed to return immediately for any worsening or change in current symptoms. I also discussed the results of my evaluation and diagnosis with patient and she concurs with the evaluation and management plan.  Detailed written and verbal instructions provided to patient and she expressed a verbal understanding of the discharge instructions and overall management plan. Reiterated the importance of medication administration and safety and advised patient to follow up with primary care provider in 3-5 days or sooner if needed.  Also advised patient to return to the ER for any complications.     Regarding CHEST PAIN, I advised the patient that chest pain can be caused by a range of conditions, from not serious to life-threatening such as: heart attack or a blood clot in your lungs, angina indicating poor blood flow to the heart; infection, inflammation, or a fracture in the bones or cartilage in chest wall; a digestive problem, such as acid reflux or a stomach ulcer; or even an anxiety attack.  Instructed patient to follow up with primary healthcare provider for reevaluation and possible diagnostic studies to find the actual cause of the chest pain. Patient was instructed to call 911 or go to the nearest emergency department if they develop any of the following signs of a heart attack: squeezing, pressure, or pain in the chest that lasts longer than five minutes or returns; discomfort or pain in the back, neck, jaw, stomach, or arm; trouble breathing; nausea or vomiting; lightheadedness;  or a sudden cold sweat, especially with chest pain or trouble breathing.  Also return to the emergency department the chest discomfort gets worse (even with medicine); cough or vomit blood; have bowel movements that are black or bloody; cannot stop vomiting; or develop difficulty swallowing.    Regarding UPPER RESPIRATORY ILLNESS, for treatment, I encouraged patient to:  drink plenty of fluids; get lots of rest; take medications as prescribed; use over-the-counter medications for symptomatic treatment;  and use a humidifier or steam in the bathroom to improve patency of upper airway.  Patient instructed to notify primary care provider, or return to the emergency department, if the they: have a cough most days or have a cough that returns frequently; begin coughing up blood; develop a high fever or shaking chills; have a low-grade fever for three or more days; develop thick, greenish mucus, especially if it has a bad smell; and experience shortness of breath or chest pain. For prevention, discussed with patient the importance of refraining from smoking (if applicable), getting annual influenza vaccines, reducing exposure to air pollution, and the need to frequently wash hands to avoid spread of infection.     Regarding HYPERTENSION, I advised patient to: keep a record of blood pressure results; take your blood pressure medication exactly as directed without skipping doses; avoid medications that contain heart stimulants, including over-the-counter drugs such as decongestants; maintain a healthy weight; cut back on sodium intake (i.e., limit canned, dried, packaged, and fast foods and dont add salt to food); follow the DASH (Dietary Approaches to Stop Hypertension) eating plan which recommends vegetables, fruits, whole gains, and other heart healthy foods; begin an exercise program that includes  aerobic exercise 3 to 4 times a week for an average of 40 minutes at a time (with approval of cardiologist or primary care provider); limit drinks that contain alcohol and caffeine; control levels of emotional stress; and seek emergency care for any shortness of breath, chest pain, difficulty speaking, confusion, or visual changes.               Discharge Medication List as of 3/8/2018  8:32 PM      START taking these medications    Details   ibuprofen (ADVIL,MOTRIN) 800 MG tablet Take 1  tablet (800 mg total) by mouth every 6 (six) hours as needed for Pain., Starting u 3/8/2018, Print      nitrofurantoin, macrocrystal-monohydrate, (MACROBID) 100 MG capsule Take 1 capsule (100 mg total) by mouth 2 (two) times daily., Starting Thu 3/8/2018, Until Tue 3/13/2018, Print      promethazine-codeine 6.25-10 mg/5 ml (PHENERGAN WITH CODEINE) 6.25-10 mg/5 mL syrup Take 5 mLs by mouth every 4 (four) hours as needed for Cough., Starting Thu 3/8/2018, Until Sun 3/18/2018, Print             Follow-up Information     Schedule an appointment as soon as possible for a visit  with Kenji Evans MD.    Specialty:  Family Medicine  Contact information:  [address] [facility]  [facility] [address]  [phone]                              Ankur Rodrigues, [place]ILIA  03/09/18 1142

## 2018-03-09 NOTE — DISCHARGE INSTRUCTIONS
Do not drive or operate heavy machinery after taking narcotics.  These medications may impair your judgment.     Follow up with primary care provider as needed.

## 2018-07-23 ENCOUNTER — HOSPITAL ENCOUNTER (EMERGENCY)
Facility: HOSPITAL | Age: 33
Discharge: HOME | End: 2018-07-23
Payer: COMMERCIAL

## 2018-07-23 VITALS
HEIGHT: 66 IN | RESPIRATION RATE: 18 BRPM | OXYGEN SATURATION: 98 % | HEART RATE: 98 BPM | SYSTOLIC BLOOD PRESSURE: 167 MMHG | WEIGHT: 293 LBS | BODY MASS INDEX: 47.09 KG/M2 | TEMPERATURE: 99 F | DIASTOLIC BLOOD PRESSURE: 87 MMHG

## 2018-07-23 DIAGNOSIS — H66.92 ACUTE OTITIS MEDIA, LEFT: ICD-10-CM

## 2018-07-23 DIAGNOSIS — L60.0 INGROWN LEFT GREATER TOENAIL: ICD-10-CM

## 2018-07-23 DIAGNOSIS — H60.501 ACUTE OTITIS EXTERNA OF RIGHT EAR, UNSPECIFIED TYPE: Primary | ICD-10-CM

## 2018-07-23 DIAGNOSIS — H66.91 ACUTE OTITIS MEDIA, RIGHT: ICD-10-CM

## 2018-07-23 PROCEDURE — 11730 AVULSION NAIL PLATE SIMPLE 1: CPT | Mod: TA

## 2018-07-23 PROCEDURE — 99283 EMERGENCY DEPT VISIT LOW MDM: CPT | Mod: 25

## 2018-07-23 PROCEDURE — S0020 INJECTION, BUPIVICAINE HYDRO: HCPCS | Performed by: NURSE PRACTITIONER

## 2018-07-23 PROCEDURE — 25000003 PHARM REV CODE 250: Performed by: NURSE PRACTITIONER

## 2018-07-23 RX ORDER — HYDROXYZINE HYDROCHLORIDE 25 MG/1
25 TABLET, FILM COATED ORAL
COMMUNITY
Start: 2018-06-26 | End: 2018-07-27

## 2018-07-23 RX ORDER — CLOBETASOL PROPIONATE 0.05 G/100ML
SHAMPOO TOPICAL
COMMUNITY
Start: 2018-04-24 | End: 2018-07-27

## 2018-07-23 RX ORDER — DESOXIMETASONE 0.5 MG/G
OINTMENT TOPICAL
COMMUNITY
Start: 2018-05-07 | End: 2018-07-27

## 2018-07-23 RX ORDER — LORATADINE 10 MG/1
10 TABLET ORAL
COMMUNITY
End: 2018-07-27

## 2018-07-23 RX ORDER — NAPROXEN 500 MG/1
500 TABLET ORAL 2 TIMES DAILY WITH MEALS
Qty: 14 TABLET | Refills: 0 | Status: SHIPPED | OUTPATIENT
Start: 2018-07-23 | End: 2018-07-23 | Stop reason: RX

## 2018-07-23 RX ORDER — BUPIVACAINE HYDROCHLORIDE 5 MG/ML
5 INJECTION, SOLUTION EPIDURAL; INTRACAUDAL
Status: COMPLETED | OUTPATIENT
Start: 2018-07-23 | End: 2018-07-23

## 2018-07-23 RX ORDER — CLINDAMYCIN HYDROCHLORIDE 150 MG/1
300 CAPSULE ORAL 4 TIMES DAILY
Qty: 56 CAPSULE | Refills: 0 | Status: SHIPPED | OUTPATIENT
Start: 2018-07-23 | End: 2018-07-30

## 2018-07-23 RX ORDER — FLUTICASONE PROPIONATE 50 MCG
1 SPRAY, SUSPENSION (ML) NASAL
COMMUNITY
End: 2020-01-22

## 2018-07-23 RX ORDER — NEOMYCIN SULFATE, POLYMYXIN B SULFATE AND HYDROCORTISONE 10; 3.5; 1 MG/ML; MG/ML; [USP'U]/ML
4 SUSPENSION/ DROPS AURICULAR (OTIC) 3 TIMES DAILY
Qty: 10 ML | Refills: 0 | Status: SHIPPED | OUTPATIENT
Start: 2018-07-23 | End: 2018-07-27

## 2018-07-23 RX ORDER — IBUPROFEN 400 MG/1
400 TABLET ORAL EVERY 6 HOURS PRN
Qty: 14 TABLET | Refills: 0 | OUTPATIENT
Start: 2018-07-23 | End: 2018-08-20

## 2018-07-23 RX ORDER — IBUPROFEN 200 MG
400 TABLET ORAL
Status: COMPLETED | OUTPATIENT
Start: 2018-07-23 | End: 2018-07-23

## 2018-07-23 RX ORDER — FLUOCINOLONE ACETONIDE 0.11 MG/ML
OIL TOPICAL
COMMUNITY
Start: 2018-03-01 | End: 2018-07-27

## 2018-07-23 RX ORDER — NAPROXEN 500 MG/1
500 TABLET ORAL
Status: DISCONTINUED | OUTPATIENT
Start: 2018-07-23 | End: 2018-07-23

## 2018-07-23 RX ADMIN — BACITRACIN, NEOMYCIN, POLYMYXIN B 1 EACH: 400; 3.5; 5 OINTMENT TOPICAL at 03:07

## 2018-07-23 RX ADMIN — BUPIVACAINE HYDROCHLORIDE 25 MG: 5 INJECTION, SOLUTION EPIDURAL; INTRACAUDAL; PERINEURAL at 03:07

## 2018-07-23 RX ADMIN — IBUPROFEN 400 MG: 200 TABLET, FILM COATED ORAL at 03:07

## 2018-07-23 NOTE — ED PROVIDER NOTES
"Encounter Date: 7/23/2018       History     Chief Complaint   Patient presents with    Otalgia     bilateral x 2 days, right ear draining    Foot Pain     left foot, great toe, pt reports "might have a infected toenail"  could not make appt with podiatrist until next week so wanted to be seen today      The history is provided by the patient.   Otalgia   This is a new (bilat ear pain with drianage from right ear ) problem. The current episode started two days ago. There is pain in both ears. The problem occurs intermittently. The problem has been waxing and waning. Pertinent negatives include no sore throat and no rash.   Foot Injury    The incident occurred at home. There was no injury mechanism (ingrown left great toe nail ). The incident occurred several weeks ago. The pain is present in the left toes. The quality of the pain is described as aching and throbbing. The pain has been intermittent since onset. Pertinent negatives include no numbness, no inability to bear weight, no loss of motion, no muscle weakness, no loss of sensation and no tingling. She reports no foreign bodies present. The symptoms are aggravated by bearing weight. She has tried nothing for the symptoms.     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   HENT: Positive for ear pain. Negative for " sore throat.    Respiratory: Negative for shortness of breath.    Cardiovascular: Negative for chest pain.   Gastrointestinal: Negative for nausea.   Genitourinary: Negative for dysuria.   Musculoskeletal: Negative for back pain.        Left great toe ingrown    Skin: Negative for rash.   Neurological: Negative for tingling, weakness and numbness.   Hematological: Does not bruise/bleed easily.       Physical Exam     Initial Vitals [07/23/18 1458]   BP Pulse Resp Temp SpO2   (!) 160/97 107 18 98.7 °F (37.1 °C) 100 %      MAP       --         Physical Exam    Nursing note and vitals reviewed.  Constitutional: She appears well-developed and well-nourished.   HENT:   Head: Normocephalic and atraumatic.   Right Ear: There is drainage, swelling and tenderness. Tympanic membrane is injected and erythematous.   Left Ear: Tympanic membrane is injected and erythematous.   Mouth/Throat: No oropharyngeal exudate.   Eyes: Conjunctivae, EOM and lids are normal. Pupils are equal, round, and reactive to light. Lids are everted and swept, no foreign bodies found.   Neck: Trachea normal, normal range of motion, full passive range of motion without pain and phonation normal. Neck supple.   Cardiovascular: Normal rate, regular rhythm, S1 normal, S2 normal, normal heart sounds, intact distal pulses and normal pulses.   Pulmonary/Chest: Effort normal and breath sounds normal. No respiratory distress.   Abdominal: Soft. Bowel sounds are normal.   Musculoskeletal:        Left foot: Left great toe: Exhibits swelling and tenderness (medial toenail ingrown with fluctuation ).   Lymphadenopathy:     She has no cervical adenopathy.     She has no axillary adenopathy.   Neurological: She is alert and oriented to person, place, and time. She has normal strength and normal reflexes. No cranial nerve deficit or sensory deficit. She displays a negative Romberg sign.   Skin: Skin is warm and dry. Capillary refill takes less than 2 seconds.          ED Course   I & D - Incision and Drainage  Date/Time: 7/23/2018 4:51 PM  Performed by: LEE VILLA  Authorized by: LEE VILLA   Indications for incision and drainage: ingrown toenail.  Location: left great toe   Anesthesia: local infiltration  Complications: No  Specimens: No  Implants: No  Patient tolerance: Patient tolerated the procedure well with no immediate complications  Comments: Left great toenail excised with small amount of pus noted         Labs Reviewed - No data to display       Imaging Results    None            Regarding OTITIS MEDIA, I recommended the use of ibuprofen and/or acetaminophen for management of pain or fever and the use of heat (utilizing a warm, moist washcloth on the ear to decrease pain for 15-20 minutes every 4 hours as needed) and/or ice (to help decrease swelling and pain utilizing an ice pack applied to the ear for 15-20 minutes every 4 hours as needed) to decrease pain.  Reiterated the importance of following up with primary care provider, especially if the pain gets worse or persist even after treatment; ear is tender or swollen; develop nausea, vomiting, or diarrhea; notice fluid draining from ear; or have any questions regarding the management and treatment of otitis media.  Also discussed importance of returning to the emergency department for febrile seizures, intractable fever, stiff neck, or difficulty breathing.          All historical, clinical, radiographic, and laboratory findings were reviewed with the patient in detail.  Findings are consistent with a diagnosis of ingrown toenail excised, otitis media, otitis externa.  All remaining questions and concerns were addressed at that time.  Patient has been counseled regarding the need for follow-up as well as the indication to return to the emergency room should new or worrisome developments occur.               Clinical Impression:   The primary encounter diagnosis was Acute otitis externa of right  ear, unspecified type. Diagnoses of Acute otitis media, right, Acute otitis media, left, and Ingrown left greater toenail were also pertinent to this visit.                             Kenji Robins NP  07/23/18 6524       Kenji Robins NP  08/04/18 0832

## 2018-07-27 ENCOUNTER — HOSPITAL ENCOUNTER (EMERGENCY)
Facility: HOSPITAL | Age: 33
Discharge: HOME | End: 2018-07-27
Attending: EMERGENCY MEDICINE
Payer: COMMERCIAL

## 2018-07-27 VITALS
TEMPERATURE: 99 F | SYSTOLIC BLOOD PRESSURE: 146 MMHG | HEIGHT: 66 IN | DIASTOLIC BLOOD PRESSURE: 86 MMHG | HEART RATE: 100 BPM | BODY MASS INDEX: 47.09 KG/M2 | RESPIRATION RATE: 20 BRPM | WEIGHT: 293 LBS | OXYGEN SATURATION: 100 %

## 2018-07-27 DIAGNOSIS — B37.9 ANTIBIOTIC-INDUCED YEAST INFECTION: ICD-10-CM

## 2018-07-27 DIAGNOSIS — L98.9 SKIN ERUPTION RESEMBLING PSORIASIS: Primary | ICD-10-CM

## 2018-07-27 DIAGNOSIS — T36.95XA ANTIBIOTIC-INDUCED YEAST INFECTION: ICD-10-CM

## 2018-07-27 PROCEDURE — 99283 EMERGENCY DEPT VISIT LOW MDM: CPT

## 2018-07-27 RX ORDER — PREDNISONE 20 MG/1
20 TABLET ORAL DAILY
Qty: 30 TABLET | Refills: 0 | Status: SHIPPED | OUTPATIENT
Start: 2018-07-27 | End: 2018-08-20

## 2018-07-27 RX ORDER — LOSARTAN POTASSIUM 25 MG/1
25 TABLET ORAL DAILY
COMMUNITY
End: 2020-01-02

## 2018-07-27 RX ORDER — BETAMETHASONE DIPROPIONATE 0.5 MG/G
LOTION TOPICAL 2 TIMES DAILY
Qty: 60 ML | Refills: 0 | Status: SHIPPED | OUTPATIENT
Start: 2018-07-27 | End: 2019-01-12

## 2018-07-27 RX ORDER — FLUCONAZOLE 150 MG/1
TABLET ORAL
Qty: 3 TABLET | Refills: 1 | Status: SHIPPED | OUTPATIENT
Start: 2018-07-27 | End: 2019-01-12

## 2018-07-27 RX ORDER — ACETAMINOPHEN AND CODEINE PHOSPHATE 300; 30 MG/1; MG/1
1-2 TABLET ORAL EVERY 6 HOURS PRN
Qty: 15 TABLET | Refills: 0 | Status: SHIPPED | OUTPATIENT
Start: 2018-07-27 | End: 2018-08-06

## 2018-07-27 NOTE — ED PROVIDER NOTES
"Encounter Date: 7/27/2018       History     Chief Complaint   Patient presents with    Rash     pt completed psoriasis flare up juju legs and arms since monday and 2 days feels has yeast infection.currently on antibotics     The history is provided by the patient.   Rash    This is a chronic problem. The current episode started several days ago ("got worse this past Monday"). The problem has been gradually worsening. Associated with: history of psoriasis - patient states that she "ran out of her prednisone and steroids" The rash is present on the right arm, left arm, right lower leg and left lower leg. The pain is at a severity of 8/10. Associated symptoms include blisters, itching, pain and weeping. She has tried anti-itch cream for the symptoms.   Female  Problem   Primary symptoms include genital itching.    Primary symptoms include no discharge, no fever, no genital lesions, no genital odor, no dysuria.   Primary symptoms comment: yeast infection secondary to antibiotic use. This is a recurrent problem. The current episode started two days ago. The problem has been unchanged. Pertinent negatives include no diaphoresis, no abdominal pain, no constipation, no diarrhea, no nausea, no vomiting, no frequency, no light-headedness and no dizziness. There is no concern regarding sexually transmitted diseases.   Patient was seen by her dermatologist, Dr. Elaine López, on 05/30/2018 and again on 06/26/2018.  Ms. Barron reports that she was unable to fill "the steroid prescription because her insurance didn't cover it". The patient was also treated in the emergency department on 07/23/2018 for an ear infection and was prescribed Clindamycin.  Mr. Barron denies any further complaints or concerns.       PCP:   Kenji Evans MD        Review of patient's allergies indicates:   Allergen Reactions    Shellfish containing products Rash    Penicillins      Past Medical History:   Diagnosis Date    Anticoagulant long-term use  " "   H/O cold sores     Hypertension     Obesity     Psoriasis     Seizures     Stroke      Past Surgical History:   Procedure Laterality Date    APPENDECTOMY      TOE AMPUTATION      Right great toe amputated due to infection from "hangnail"      Family History   Problem Relation Age of Onset    Cancer Neg Hx      Social History   Substance Use Topics    Smoking status: Never Smoker    Smokeless tobacco: Never Used    Alcohol use No     Review of Systems   Constitutional: Negative for diaphoresis and fever.   HENT: Negative for congestion and sore throat.    Respiratory: Negative for cough, chest tightness, shortness of breath and wheezing.    Cardiovascular: Negative for chest pain and palpitations.   Gastrointestinal: Negative for abdominal pain, constipation, diarrhea, nausea and vomiting.   Genitourinary: Negative for dysuria and frequency.        Positive for "yeast infection".   Musculoskeletal: Negative for back pain and neck pain.   Skin: Positive for itching and rash (psoriasis).   Neurological: Negative for dizziness, weakness, light-headedness and headaches.   Hematological: Does not bruise/bleed easily.   Psychiatric/Behavioral: Negative for confusion.       Physical Exam     Initial Vitals [07/27/18 1436]   BP Pulse Resp Temp SpO2   (!) 146/86 100 20 98.6 °F (37 °C) 100 %      MAP       --         Physical Exam    Nursing note and vitals reviewed.  Constitutional: She appears well-developed and well-nourished. She is Obese . She is cooperative. She does not appear ill. No distress.   HENT:   Head: Normocephalic and atraumatic.   Nose: Nose normal.   Mouth/Throat: Uvula is midline, oropharynx is clear and moist and mucous membranes are normal.   Eyes: Conjunctivae, EOM and lids are normal. Pupils are equal, round, and reactive to light.   Neck: Trachea normal and normal range of motion. Neck supple.   Cardiovascular: Normal rate, regular rhythm, intact distal pulses and normal pulses. " "  Pulmonary/Chest: Effort normal. No respiratory distress.   Genitourinary:   Genitourinary Comments: Exam deferred as patient reports she "experiences yeast infections every time she takes antibiotics".    Musculoskeletal: Normal range of motion. She exhibits no edema or tenderness.   Neurological: She is alert and oriented to person, place, and time. She has normal strength. Gait normal. GCS eye subscore is 4. GCS verbal subscore is 5. GCS motor subscore is 6.   Skin: Skin is warm and dry. Capillary refill takes less than 2 seconds. Rash noted. Rash is maculopapular and pustular. There is erythema.   Diffuse maculopapular and pustular lesions noted to upper and lower extremities  overlying a glossy homogeneous erythematous base. Weeping noted from pustular lesions on the lower bilateral extremities.  Patient states that this is consistent with her "psoriasis flare ups" and "knows it is because she ran out of steroids".     Psychiatric: She has a normal mood and affect. Her speech is normal and behavior is normal. Cognition and memory are normal.         ED Course   Procedures         1500 HOURS DISPOSITION: The patient is resting comfortably in no acute distress.  She is hemodynamically stable and is without objective evidence for acute process requiring urgent intervention or hospitalization. I provided counseling to patient with regard to condition, the treatment plan, specific conditions for return, and the importance of follow up. Detailed written and verbal instructions provided to patient and she expressed a verbal understanding of the discharge instructions and overall management plan. Reiterated the importance of medication administration and safety and advised patient to follow up with dermatologist and  primary care provider in 3-5 days or sooner if needed.  Answered questions at this time. The patient is stable for discharge.        Medical Decision Making:   History:   Old Records Summarized: records " from clinic visits.                      Clinical Impression:       ICD-10-CM ICD-9-CM   1. Skin eruption resembling psoriasis L98.9 696.8   2. Antibiotic-induced yeast infection B37.9 112.9    T36.95XA E930.9           Disposition:   Disposition: Discharged  Condition: Stable  I discussed with patient that the evaluation in the emergency department does not suggest any emergent or life threatening medical condition requiring immediate intervention beyond what was provided in the ED, and I believe patient is safe for discharge.  Regardless, an unremarkable evaluation in the ED does not preclude the development or presence of a serious of life threatening condition. As such, patient was instructed to return immediately for any worsening or change in current symptoms. I also discussed the results of my evaluation and diagnosis with patient and she concurs with the evaluation and management plan.  Detailed written and verbal instructions provided to patient and she expressed a verbal understanding of the discharge instructions and overall management plan. Reiterated the importance of medication administration and safety and advised patient to follow up with primary care provider in 3-5 days or sooner if needed.  Also advised patient to return to the ER for any complications.     Regarding VULVOVAGINITIS, I discussed importance of proper cleansing to help prevent irritation to vaginal area.  Encouraged patient to wear cotton underwear to increase air flow and decrease moisture to genital area.  I advised that if an STD is found in lab results, notification of sexual partners is imperative to help prevent recurrent infection. Instructed patient to: use a condom during sexual intercourse to help prevent most STDs and many cases of BVV; always wipe from the front to the back to avoid introducing bacteria from the rectum to the vaginal area; wash hands thoroughly before and after using bathroom; and to take all medications  as prescribed.  I reiterated the importance of annual GYN examinations for optimal women's health.         Discharge Medication List as of 7/27/2018  3:01 PM      START taking these medications    Details   acetaminophen-codeine 300-30mg (TYLENOL #3) 300-30 mg Tab Take 1-2 tablets by mouth every 6 (six) hours as needed (Pain)., Starting Fri 7/27/2018, Until Mon 8/6/2018, Print      betamethasone dipropionate (DIPROLENE) 0.05 % lotion Apply topically 2 (two) times daily. for 10 days, Starting Fri 7/27/2018, Until Mon 8/6/2018, Normal      fluconazole (DIFLUCAN) 150 MG Tab Take one tablet AFTER completing full course of antibiotics. You may repeat in 72 hours if no improvement., Normal      predniSONE (DELTASONE) 20 MG tablet Take 1 tablet (20 mg total) by mouth once daily., Starting Fri 7/27/2018, Normal             Follow-up Information     Schedule an appointment as soon as possible for a visit  with Kenji Evans MD.    Specialty:  Family Medicine  Contact information:  [address] [facility] [id number] [phone]             Schedule an appointment as soon as possible for a visit  with Ealine López MD.    Specialty:  Dermatology  Contact information:  [address] [place] [address] [phone]                                    Ankur Rodrigues NP  07/29/18 0223

## 2018-07-27 NOTE — ED NOTES
Aaox3, skin warm and dry- rash - psoriasis patches over arms , back and legs - flare up since off antibotics on Monday.amb with steady gait.

## 2018-07-27 NOTE — DISCHARGE INSTRUCTIONS
Your medications (betamethasone, prednisone, and fluconazole) have been sent electronically to [email or web address].      Do not drive or operate heavy machinery after taking pain medication.

## 2018-08-03 ENCOUNTER — HOSPITAL ENCOUNTER (EMERGENCY)
Facility: HOSPITAL | Age: 33
Discharge: HOME | End: 2018-08-03
Attending: EMERGENCY MEDICINE
Payer: COMMERCIAL

## 2018-08-03 VITALS
OXYGEN SATURATION: 100 % | BODY MASS INDEX: 47.09 KG/M2 | TEMPERATURE: 98 F | HEART RATE: 76 BPM | WEIGHT: 293 LBS | DIASTOLIC BLOOD PRESSURE: 84 MMHG | SYSTOLIC BLOOD PRESSURE: 178 MMHG | RESPIRATION RATE: 18 BRPM | HEIGHT: 66 IN

## 2018-08-03 DIAGNOSIS — M25.579 ANKLE PAIN: ICD-10-CM

## 2018-08-03 PROCEDURE — 99283 EMERGENCY DEPT VISIT LOW MDM: CPT | Mod: 25

## 2018-08-03 RX ORDER — NAPROXEN 375 MG/1
375 TABLET ORAL 2 TIMES DAILY WITH MEALS
Qty: 30 TABLET | Refills: 0 | Status: SHIPPED | OUTPATIENT
Start: 2018-08-03 | End: 2018-08-20

## 2018-08-03 NOTE — ED PROVIDER NOTES
"Encounter Date: 8/3/2018       History     Chief Complaint   Patient presents with    Ankle Pain     since Tuesday, pt states "twisted right ankle" pt reports seen here last week for ankle swelling and this episode has not increased previous swelling      The history is provided by the patient.   Ankle Pain   The current episode started 2 days ago. The problem occurs constantly. The problem has not changed since onset.Pertinent negatives include no chest pain, no abdominal pain, no headaches and no shortness of breath. The symptoms are aggravated by walking. The symptoms are relieved by rest and ice.     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   HENT: Negative for sore throat.    Respiratory: Negative for shortness of breath.    Cardiovascular: Negative for chest pain.   Gastrointestinal: Negative for abdominal pain and nausea.   Genitourinary: Negative for dysuria.   Musculoskeletal: Negative for back pain.   Skin: Negative for rash.   Neurological: Negative for weakness and headaches.   Hematological: Does not bruise/bleed easily.       Physical Exam     Initial Vitals [08/03/18 1022]   BP Pulse Resp Temp SpO2   (!) 178/84 76 18 98.1 °F (36.7 °C) 100 %      MAP       --         Physical Exam    Nursing note and vitals reviewed.  Constitutional: She appears " well-developed and well-nourished. No distress.   HENT:   Head: Normocephalic and atraumatic.   Mouth/Throat: Oropharynx is clear and moist.   Eyes: Conjunctivae and EOM are normal. Pupils are equal, round, and reactive to light.   Neck: Normal range of motion. Neck supple.   Cardiovascular: Normal rate, regular rhythm and normal heart sounds. Exam reveals no gallop and no friction rub.    No murmur heard.  Pulmonary/Chest: Breath sounds normal. No respiratory distress. She has no wheezes. She has no rhonchi. She has no rales.   Abdominal: Soft. Bowel sounds are normal. She exhibits no distension and no mass. There is no tenderness. There is no rebound and no guarding.   Musculoskeletal: Normal range of motion. She exhibits no edema or tenderness.        Right ankle: She exhibits swelling. She exhibits normal range of motion and no deformity.        Feet:    Neurological: She is alert and oriented to person, place, and time. She has normal strength.   Skin: Skin is warm and dry. Rash noted. Rash is urticarial (Diffuse).   Psychiatric: She has a normal mood and affect. Thought content normal.         ED Course   Procedures  Labs Reviewed - No data to display       Imaging Results          X-Ray Ankle Complete Right (Final result)  Result time 08/03/18 10:52:03    Final result by Milton Whitman MD (08/03/18 10:52:03)                 Impression:      No acute bony abnormality.      Electronically signed by: Milton Whitman MD  Date:    08/03/2018  Time:    10:52             Narrative:    EXAMINATION:  XR ANKLE COMPLETE 3 VIEW RIGHT    CLINICAL HISTORY:  Pain in unspecified ankle and joints of unspecified foot    TECHNIQUE:  Routine radiographs obtained.    COMPARISON:  05/19/2017    FINDINGS:  Negative for acute fracture or dislocation.    No significant arthritic changes.    Diffuse mild edema.                              ED Vital Signs:  Vitals:    08/03/18 1022   BP: (!) 178/84   Pulse: 76   Resp: 18   Temp: 98.1 °F  "(36.7 °C)   TempSrc: Oral   SpO2: 100%   Weight: (!) 138.2 kg (304 lb 10.8 oz)   Height: 5' 6" (1.676 m)         Abnormal Lab Results:  Labs Reviewed - No data to display       All Lab Results:        Imaging Results:  Imaging Results          X-Ray Ankle Complete Right (Final result)  Result time 08/03/18 10:52:03    Final result by Milton Whitman MD (08/03/18 10:52:03)                 Impression:      No acute bony abnormality.      Electronically signed by: Milton Whitman MD  Date:    08/03/2018  Time:    10:52             Narrative:    EXAMINATION:  XR ANKLE COMPLETE 3 VIEW RIGHT    CLINICAL HISTORY:  Pain in unspecified ankle and joints of unspecified foot    TECHNIQUE:  Routine radiographs obtained.    COMPARISON:  05/19/2017    FINDINGS:  Negative for acute fracture or dislocation.    No significant arthritic changes.    Diffuse mild edema.                                   The Emergency Provider reviewed the vital signs and test results, which are outlined above.    ED Discussions:  10:59 AM: Reassessed pt at this time.  Pt states her condition has improved at this time. Discussed with pt all pertinent ED information and results. Discussed pt dx of ankle sprain and plan of tx. Gave pt all f/u and return to the ED instructions. All questions and concerns were addressed at this time. Pt expresses understanding of information and instructions, and is comfortable with plan to discharge. Pt is stable for discharge.                                    Clinical Impression:       ICD-10-CM ICD-9-CM   1. Ankle pain M25.579 719.47           Disposition:   Disposition: Discharged  Condition: Stable                        Jaret House MD  08/03/18 1059    "

## 2018-08-20 ENCOUNTER — HOSPITAL ENCOUNTER (EMERGENCY)
Facility: HOSPITAL | Age: 33
Discharge: HOME | End: 2018-08-20
Attending: EMERGENCY MEDICINE
Payer: COMMERCIAL

## 2018-08-20 VITALS
WEIGHT: 291 LBS | TEMPERATURE: 99 F | BODY MASS INDEX: 46.77 KG/M2 | SYSTOLIC BLOOD PRESSURE: 164 MMHG | DIASTOLIC BLOOD PRESSURE: 91 MMHG | OXYGEN SATURATION: 99 % | RESPIRATION RATE: 18 BRPM | HEIGHT: 66 IN | HEART RATE: 85 BPM

## 2018-08-20 DIAGNOSIS — S93.491D SPRAIN OF ANTERIOR TALOFIBULAR LIGAMENT OF RIGHT ANKLE, SUBSEQUENT ENCOUNTER: Primary | ICD-10-CM

## 2018-08-20 DIAGNOSIS — I10 ESSENTIAL HYPERTENSION: ICD-10-CM

## 2018-08-20 DIAGNOSIS — Z91.81 HISTORY OF RECENT FALL: ICD-10-CM

## 2018-08-20 DIAGNOSIS — M79.671 RIGHT FOOT PAIN: ICD-10-CM

## 2018-08-20 PROBLEM — L30.9 ECZEMA: Status: ACTIVE | Noted: 2018-05-16

## 2018-08-20 PROCEDURE — 29515 APPLICATION SHORT LEG SPLINT: CPT

## 2018-08-20 PROCEDURE — 99283 EMERGENCY DEPT VISIT LOW MDM: CPT | Mod: 25

## 2018-08-20 PROCEDURE — 25000003 PHARM REV CODE 250: Performed by: NURSE PRACTITIONER

## 2018-08-20 RX ORDER — KETOROLAC TROMETHAMINE 10 MG/1
10 TABLET, FILM COATED ORAL
Status: COMPLETED | OUTPATIENT
Start: 2018-08-20 | End: 2018-08-20

## 2018-08-20 RX ORDER — ACETAMINOPHEN AND CODEINE PHOSPHATE 300; 30 MG/1; MG/1
1-2 TABLET ORAL EVERY 6 HOURS PRN
Qty: 15 TABLET | Refills: 0 | Status: SHIPPED | OUTPATIENT
Start: 2018-08-20 | End: 2018-08-30

## 2018-08-20 RX ORDER — NABUMETONE 500 MG/1
500 TABLET, FILM COATED ORAL 2 TIMES DAILY PRN
Qty: 30 TABLET | Refills: 0 | Status: SHIPPED | OUTPATIENT
Start: 2018-08-20 | End: 2019-01-12

## 2018-08-20 RX ORDER — CLONIDINE HYDROCHLORIDE 0.2 MG/1
0.2 TABLET ORAL
Status: COMPLETED | OUTPATIENT
Start: 2018-08-20 | End: 2018-08-20

## 2018-08-20 RX ADMIN — CLONIDINE HYDROCHLORIDE 0.2 MG: 0.2 TABLET ORAL at 08:08

## 2018-08-20 RX ADMIN — KETOROLAC TROMETHAMINE 10 MG: 10 TABLET, FILM COATED ORAL at 08:08

## 2018-08-21 NOTE — ED PROVIDER NOTES
"Encounter Date: 8/20/2018       History     Chief Complaint   Patient presents with    Foot Pain     right foot pain x 3 weeks. Pt was seen in ER on Friday with no Fx found on X-ray per pt.     The history is provided by the patient.   Foot Injury    The injury mechanism was a fall (three weeks ago). The incident occurred several weeks ago (approximately three weeks ago). The pain is present in the right foot. The quality of the pain is described as aching and throbbing. The pain is at a severity of 10/10. Pertinent negatives include no numbness, no inability to bear weight, no loss of motion, no muscle weakness, no loss of sensation and no tingling. The symptoms are aggravated by bearing weight, activity and palpation. She has tried rest, ice and heat for the symptoms. The treatment provided no relief.   Patient was seen in the emergency department at [facility] on 08/03/2018 for similar complaint.  She states that she was also seen at [facility] in [place] on Friday and reports that more radiographs of her right ankle were completed but no fractures or abnormalities were noted. Patient states that her pain is "mostly in her foot".  She denies any new trauma or any further complaints or concerns.       PCP:    Kenji Evans MD        Review of patient's allergies indicates:   Allergen Reactions    Shellfish containing products Rash    Penicillins      Past Medical History:   Diagnosis Date    Anticoagulant long-term use     H/O cold sores     Hypertension     Obesity     Psoriasis     Seizures     Stroke      Past Surgical History:   Procedure Laterality Date    APPENDECTOMY      TOE AMPUTATION      Right great toe amputated due to infection from "hangnail"      Family History   Problem Relation Age of Onset    Cancer Neg Hx      Social History     Tobacco Use    Smoking status: Never Smoker    Smokeless tobacco: Never Used   Substance Use Topics    Alcohol use: No    Drug use: No     Review of " Systems   Constitutional: Negative for chills and fever.   HENT: Negative for congestion and sore throat.    Respiratory: Negative for cough, chest tightness, shortness of breath and wheezing.    Cardiovascular: Negative for chest pain.   Gastrointestinal: Negative for abdominal pain and nausea.   Genitourinary: Negative for dysuria.   Musculoskeletal: Negative for back pain and neck pain.        Positive for right foot pain.    Skin: Negative for rash.   Neurological: Negative for tingling, weakness, numbness and headaches.   Hematological: Does not bruise/bleed easily.   Psychiatric/Behavioral: Negative for confusion.       Physical Exam     Initial Vitals [08/20/18 1920]   BP Pulse Resp Temp SpO2   (!) 184/112 94 18 98 °F (36.7 °C) 100 %      MAP       --         Physical Exam    Nursing note and vitals reviewed.  Constitutional: She appears well-developed and well-nourished. She is Obese . She is cooperative. She does not appear ill. No distress.   HENT:   Head: Normocephalic and atraumatic.   Nose: Nose normal.   Mouth/Throat: Uvula is midline, oropharynx is clear and moist and mucous membranes are normal.   Eyes: Conjunctivae, EOM and lids are normal. Pupils are equal, round, and reactive to light.   Neck: Trachea normal and normal range of motion. Neck supple.   Cardiovascular: Normal rate, regular rhythm, intact distal pulses and normal pulses.   Pulmonary/Chest: Effort normal and breath sounds normal. No accessory muscle usage. No respiratory distress. She has no wheezes. She has no rhonchi. She has no rales.   Musculoskeletal: She exhibits no edema.        Right foot: There is decreased range of motion (secondary to pain) and tenderness (reproducible tenderness noted to dorsal aspect of right foot along the anterior talofibular ligament - no crepitus or obvious deformity noted - neurovascular intact distally). There is normal capillary refill, no crepitus and no deformity.        Feet:    Amputation of  "right great toe noted on exam.   Neurological: She is alert and oriented to person, place, and time. She has normal strength. Gait normal. GCS eye subscore is 4. GCS verbal subscore is 5. GCS motor subscore is 6.   Neurovascular intact to all extremities.    Skin: Skin is warm, dry and intact. Capillary refill takes less than 2 seconds. No rash noted.   Psoriasis and eczema noted to bilateral lower extremities.    Psychiatric: She has a normal mood and affect. Her speech is normal and behavior is normal. Cognition and memory are normal.         ED Course   Splint Application  Date/Time: 2018 9:10 PM  Performed by: Ankur Rodrigues NP  Authorized by: Ankur Rodrigues NP   Consent Done: Yes  Consent: Verbal consent obtained.  Risks and benefits: risks, benefits and alternatives were discussed  Consent given by: patient  Patient understanding: patient states understanding of the procedure being performed  Site marked: the operative site was marked  Imaging studies: imaging studies available  Patient identity confirmed: , MRN, name and verbally with patient  Time out: Immediately prior to procedure a "time out" was called to verify the correct patient, procedure, equipment, support staff and site/side marked as required.  Location details: right ankle (and foot)  Splint type: walking boot.  Supplies used: walking boot.  Post-procedure: The splinted body part was neurovascularly unchanged following the procedure.  Patient tolerance: Patient tolerated the procedure well with no immediate complications             ED Imaging Results:   Imaging Results          X-Ray Foot Complete Right (Final result)  Result time 18 20:31:20    Final result by Jermaine Waite MD (18 20:31:20)                 Impression:      1. Negative for acute fracture dislocation.  2. Status post great toe amputation.      Electronically signed by: Jermaine Waite MD  Date:    2018  Time:    20:31          " "   Narrative:    EXAMINATION:  XR FOOT COMPLETE 3 VIEW RIGHT    CLINICAL HISTORY:  . Right foot pain.    COMPARISON:  None    FINDINGS:  Status post amputation great toe.  No fracture.  No suspicious osseous lesion.  Joint spaces are well preserved.  Soft tissues are normal.                                ED Medications:   Medications   cloNIDine tablet 0.2 mg (0.2 mg Oral Given 8/20/18 2021)   ketorolac tablet 10 mg (10 mg Oral Given 8/20/18 2021)         ED Course Vitals  Vitals:    08/20/18 1920 08/20/18 2111   BP: (!) 184/112 (!) 164/91   BP Location: Left arm Right arm   Patient Position: Sitting Sitting   Pulse: 94 85   Resp: 18 18   Temp: 98 °F (36.7 °C) 98.5 °F (36.9 °C)   TempSrc: Oral Oral   SpO2: 100% 99%   Weight: 132 kg (291 lb 0.1 oz)    Height: 5' 6" (1.676 m)        2115 HOURS RE-EVALUATION & DISPOSITION:   Reassessment at the time of disposition demonstrates that the patient is resting comfortably in no acute distress. Blood pressure improved as noted above following administration of Clonidine & Toradol.  She has remained hemodynamically stable throughout the entire ED visit and is without objective evidence for acute process requiring urgent intervention or hospitalization. I discussed test results and provided counseling to patient with regard to condition, the treatment plan, specific conditions for return, and the importance of follow up.  Answered questions at this time. The patient is stable for discharge.       X-Rays:   Independently Interpreted Readings:   Other Readings:  Radiographs of the right foot reveal no acute findings.     Medical Decision Making:   History:   Old Records Summarized: records from clinic visits.  Clinical Tests:   Radiological Study: Ordered and Reviewed    Additional MDM:   Hypertension: The patient has hypertensive urgency (systolic BP > 180 and/or diastolic BP > 110 with no end organ damage). Medications given: Catapres PO. Response: The patient's BP was " controlled using oral medications. Other - Toxicology: Not done. The patient's condition was felt to be stable.                    Clinical Impression:       ICD-10-CM ICD-9-CM   1. Sprain of anterior talofibular ligament of right ankle, subsequent encounter S93.491D V58.89     845.09   2. Right foot pain M79.671 729.5   3. History of recent fall Z91.81 V15.88   4. Essential hypertension I10 401.9           Disposition:   Disposition: Discharged  Condition: Stable  I discussed with patient that the evaluation in the emergency department does not suggest any emergent or life threatening medical condition requiring immediate intervention beyond what was provided in the ED, and I believe patient is safe for discharge.  Regardless, an unremarkable evaluation in the ED does not preclude the development or presence of a serious of life threatening condition. As such, patient was instructed to return immediately for any worsening or change in current symptoms. I also discussed the results of my evaluation and diagnosis with patient and she concurs with the evaluation and management plan.  Detailed written and verbal instructions provided to patient and she expressed a verbal understanding of the discharge instructions and overall management plan. Reiterated the importance of medication administration and safety and advised patient to follow up with primary care provider in 3-5 days or sooner if needed.  Also advised patient to return to the ER for any complications.     Regarding FOOT PAIN, for treatment, patient was instructed to: rest ankle for several days without applying weight on foot; use an ACE bandage or brace; consider using crutches or a cane to help take the weight off a sore or unsteady foot; keep foot elevated; apply ice to area immediately and for 10-15 minutes every hour for the first day, then, apply ice every 3-4 hours for 2 more days; and try OTC Tylenol or Ibuprofen for pain and swelling. Patient  advised to notify primary care provider or return to ER if swelling does not decrease within 2-3 days or notice s/s of infection (redness, increased pain, fever, and warmth around foot); or if they notice a popping sound and have immediate trouble using or moving ankle or foot. For prevention, patient advised to obtain/maintain healthy weight; warm up before exercising; avoid sports and activities in which proper conditioning has not been achieved; ensure that shoes fit properly; use support braces, work on balance, and do agility exercises.    Regarding HYPERTENSION, I advised patient to: keep a record of blood pressure results; take your blood pressure medication exactly as directed without skipping doses; avoid medications that contain heart stimulants, including over-the-counter drugs such as decongestants; maintain a healthy weight; cut back on sodium intake (i.e., limit canned, dried, packaged, and fast foods and dont add salt to food); follow the DASH (Dietary Approaches to Stop Hypertension) eating plan which recommends vegetables, fruits, whole gains, and other heart healthy foods; begin an exercise program that includes  aerobic exercise 3 to 4 times a week for an average of 40 minutes at a time (with approval of cardiologist or primary care provider); limit drinks that contain alcohol and caffeine; control levels of emotional stress; and seek emergency care for any shortness of breath, chest pain, difficulty speaking, confusion, or visual changes.           Discharge Medication List as of 8/20/2018  9:14 PM      START taking these medications    Details   acetaminophen-codeine 300-30mg (TYLENOL #3) 300-30 mg Tab Take 1-2 tablets by mouth every 6 (six) hours as needed (Pain)., Starting Mon 8/20/2018, Until Thu 8/30/2018, Print      nabumetone (RELAFEN) 500 MG tablet Take 1 tablet (500 mg total) by mouth 2 (two) times daily as needed for Pain., Starting Mon 8/20/2018, Normal               Follow-up  Information     Schedule an appointment as soon as possible for a visit  with Kenji Evans MD.    Specialty:  Family Medicine  Contact information:  [address] [facility]  [facility] [id number] [phone]                                  Ankur Rodriuges NP  08/20/18 2530

## 2018-09-05 ENCOUNTER — HOSPITAL ENCOUNTER (EMERGENCY)
Facility: HOSPITAL | Age: 33
Discharge: HOME | End: 2018-09-05
Attending: EMERGENCY MEDICINE
Payer: COMMERCIAL

## 2018-09-05 VITALS
OXYGEN SATURATION: 99 % | HEART RATE: 65 BPM | TEMPERATURE: 99 F | HEIGHT: 66 IN | RESPIRATION RATE: 18 BRPM | SYSTOLIC BLOOD PRESSURE: 183 MMHG | DIASTOLIC BLOOD PRESSURE: 83 MMHG | WEIGHT: 293 LBS | BODY MASS INDEX: 47.09 KG/M2
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
## 2018-09-05 DIAGNOSIS — M25.561 KNEE PAIN, RIGHT: ICD-10-CM

## 2018-09-05 DIAGNOSIS — M79.604 ACUTE LEG PAIN, RIGHT: ICD-10-CM

## 2018-09-05 DIAGNOSIS — I10 HYPERTENSION, UNSPECIFIED TYPE: ICD-10-CM

## 2018-09-05 DIAGNOSIS — R51.9 HEADACHE: ICD-10-CM

## 2018-09-05 PROCEDURE — 25000003 PHARM REV CODE 250: Performed by: EMERGENCY MEDICINE

## 2018-09-05 PROCEDURE — 99284 EMERGENCY DEPT VISIT MOD MDM: CPT

## 2018-09-05 RX ORDER — PREDNISONE 20 MG/1
60 TABLET ORAL
COMMUNITY
Start: 2018-09-04 | End: 2018-09-09

## 2018-09-05 RX ORDER — HYDROXYZINE HYDROCHLORIDE 25 MG/1
25 TABLET, FILM COATED ORAL
COMMUNITY
Start: 2018-08-29 | End: 2018-09-08

## 2018-09-05 RX ORDER — NAPROXEN 375 MG/1
375 TABLET ORAL
COMMUNITY
End: 2019-03-28

## 2018-09-05 RX ORDER — HYDROCHLOROTHIAZIDE 12.5 MG/1
12.5 TABLET ORAL
COMMUNITY
End: 2019-11-13

## 2018-09-05 RX ORDER — AMLODIPINE BESYLATE 10 MG/1
10 TABLET ORAL
COMMUNITY
End: 2020-03-30

## 2018-09-05 RX ORDER — CEPHALEXIN 250 MG/1
250 CAPSULE ORAL
COMMUNITY
Start: 2018-09-04 | End: 2018-09-11

## 2018-09-05 RX ORDER — TRAMADOL HYDROCHLORIDE 50 MG/1
50 TABLET ORAL
Status: COMPLETED | OUTPATIENT
Start: 2018-09-05 | End: 2018-09-05

## 2018-09-05 RX ADMIN — TRAMADOL HYDROCHLORIDE 50 MG: 50 TABLET, FILM COATED ORAL at 05:09

## 2018-09-05 NOTE — ED PROVIDER NOTES
"Encounter Date: 9/5/2018       History     Chief Complaint   Patient presents with    Fall     pt states recent d/c from hospital and elevated BP, state fall this am with right side leg pain and headache      The history is provided by the patient.   Fall   The accident occurred today. The fall occurred while walking. She fell from a height of 1 to 2 ft. She landed on carpet. The pain is present in the right knee and right hip. The pain is at a severity of 5/10. She was ambulatory at the scene. There was no entrapment after the fall. There was no drug use involved in the accident. There was no alcohol use involved in the accident. Associated symptoms include headaches. Pertinent negatives include no neck pain, no back pain, no paresthesias, no paralysis, no visual change, no fever, no numbness, no abdominal pain, no bowel incontinence, no nausea, no vomiting, no hematuria, no hearing loss, no loss of consciousness and no tingling. She has tried nothing for the symptoms. The treatment provided no relief.     Review of patient's allergies indicates:   Allergen Reactions    Shellfish containing products Rash    Penicillins      Past Medical History:   Diagnosis Date    Anticoagulant long-term use     H/O cold sores     Hypertension     Obesity     Psoriasis     Seizures     Stroke      Past Surgical History:   Procedure Laterality Date    APPENDECTOMY      TOE AMPUTATION      Right great toe amputated due to infection from "hangnail"      Family History   Problem Relation Age of Onset    Cancer Neg Hx      Social History     Tobacco Use    Smoking status: Never Smoker    Smokeless tobacco: Never Used   Substance Use Topics    Alcohol use: No    Drug use: No     Review of Systems   Constitutional: Negative for fever.   Gastrointestinal: Negative for abdominal pain, bowel incontinence, nausea and vomiting.   Genitourinary: Negative for hematuria.   Musculoskeletal: Negative for back pain and neck pain. " "  Neurological: Positive for headaches. Negative for tingling, loss of consciousness, numbness and paresthesias.   All other systems reviewed and are negative.      Physical Exam     Initial Vitals [09/05/18 1553]   BP Pulse Resp Temp SpO2   (!) 179/85 64 20 98.7 °F (37.1 °C) 100 %      MAP       --         Physical Exam    Nursing note and vitals reviewed.  Constitutional: She appears well-developed and well-nourished. No distress.   Morbid Obesity   HENT:   Head: Normocephalic and atraumatic.   Mouth/Throat: Oropharynx is clear and moist.   Eyes: Conjunctivae and EOM are normal. Pupils are equal, round, and reactive to light.   Neck: Normal range of motion. Neck supple.   Cardiovascular: Normal rate and regular rhythm.   Pulmonary/Chest: Breath sounds normal. No respiratory distress.   Abdominal: Soft. Bowel sounds are normal. She exhibits no distension. There is no tenderness.   Musculoskeletal: Normal range of motion.   Neurological: She is alert and oriented to person, place, and time. She has normal strength.   Skin: Skin is warm and dry.   Psychiatric: She has a normal mood and affect. Thought content normal.         ED Course   Procedures  Labs Reviewed - No data to display       Imaging Results    None     4:39 PM I am obtaining CT HEAD secondary to "severe" HA with elevated blood pressure, taking Eliquis, events are unclear and pt is recently discharged from hospital with high risk for complications.      5:22 PM - Counseling: Spoke with the patient and discussed todays findings, in addition to providing specific details for the plan of care and counseling regarding the diagnosis and prognosis. Questions are answered at this time. GCS 15.    Trauma precautions were discussed with patient and/or family/caretaker; I do not specifically detect any abdominal, thoracic, CNS, orthopedic, or other emergent or life threatening condition and that patient is safe to be discharged.  It was also discussed that " despite an unrevealing examination and negative radiographic examination for serious or life threatening injury, these conditions may still exist.  As such, patient should return to ED immediately should they experience, severe or worsening pain, shortness of breath, abdominal pain, headache, vomiting, or any other concern.  It was also discussed that not infrequently, injuries may not be diagnosed during the initial ED visit (such as fractures) and that if the patient discovers a new area of concern, a new area of injury that was not evaluated in the ED, they should return for evaluation as they may have an injury that requires treatment.    Pre-hypertension/Hypertension: The pt has been informed that they may have pre-hypertension or hypertension based on a blood pressure reading in the ED. I recommend that the pt call the PCP listed on their discharge instructions or a physician of their choice this week to arrange f/u for further evaluation of possible pre-hypertension or hypertension.                              Clinical Impression:   The primary encounter diagnosis was Fall, initial encounter. Diagnoses of Headache, Knee pain, right, Acute leg pain, right, and Hypertension, unspecified type were also pertinent to this visit.      Disposition:   Disposition: Discharged  Condition: Stable                        Harry Ochoa MD  09/05/18 9609

## 2018-09-05 NOTE — ED NOTES
Pt complains of fall this am. C/o pain to right LE. Denies any neuro deficits. Hx of stroke 2 years ago. Right sided facial droop noted.   Level of Consciousness: The patient is  awake, alert, and oriented to person, place and time. Pts affect is appropriate, speech is appropriate.   Appearance: Pt is resting in stretcher, no distress is noted. Clothing and hygiene are appropriate for age and situation.   Skin: Skin is W/D/I. Normal skin turgor. Mucous membranes are moist. Skin color normal.  No deformities noted.  Musculoskeletal: Moves all extremities with no difficulty. full range of motion. No obvious deformities noted. Pt ambulates independently.   Respiratory: Airway open and patent. Respiration rate even and unlabored. No use of accessory muscles noted. Breath sounds clear to ausculation.   Cardiac: Denies chest pain at this time. No peripheral edema noted. Peripheral pulses palpated. Capillary refill brisk. +htn. History of htn. Seen last night at ER for same thing.   Abdomen: No abdominal distension noted. Abdomin soft and non-tender to palpation. Active bowel sounds in all four quadrants.   Neurologic: Left sided facial droop noted in face. Hand grasps equal bilaterally. Normal sensation reported in all extremities. No obvious neurological deficits noted from fall this am.   Psychosocial: Family at bedside.     Pt made aware of plan of care, verbalizes understanding and denies any questions at this time. Bed low and locked, side rails upx2. Call light in reach. Fall risk bracelet applied. Will continue to monitor patient.

## 2018-11-13 ENCOUNTER — HOSPITAL ENCOUNTER (EMERGENCY)
Facility: HOSPITAL | Age: 33
Discharge: HOME | End: 2018-11-13
Attending: EMERGENCY MEDICINE
Payer: COMMERCIAL

## 2018-11-13 VITALS
WEIGHT: 284.06 LBS | RESPIRATION RATE: 20 BRPM | SYSTOLIC BLOOD PRESSURE: 137 MMHG | TEMPERATURE: 99 F | HEIGHT: 66 IN | DIASTOLIC BLOOD PRESSURE: 90 MMHG | BODY MASS INDEX: 45.65 KG/M2 | HEART RATE: 94 BPM | OXYGEN SATURATION: 100 %

## 2018-11-13 DIAGNOSIS — H10.32 ACUTE CONJUNCTIVITIS OF LEFT EYE, UNSPECIFIED ACUTE CONJUNCTIVITIS TYPE: ICD-10-CM

## 2018-11-13 DIAGNOSIS — L30.8 PRURITIC DERMATITIS: Primary | ICD-10-CM

## 2018-11-13 PROCEDURE — 99283 EMERGENCY DEPT VISIT LOW MDM: CPT

## 2018-11-13 RX ORDER — PREDNISONE 50 MG/1
50 TABLET ORAL DAILY
Qty: 5 TABLET | Refills: 0 | Status: SHIPPED | OUTPATIENT
Start: 2018-11-13 | End: 2018-11-18

## 2018-11-13 RX ORDER — ERYTHROMYCIN 5 MG/G
OINTMENT OPHTHALMIC EVERY 4 HOURS
Qty: 1 TUBE | Refills: 0 | Status: SHIPPED | OUTPATIENT
Start: 2018-11-13 | End: 2019-01-12

## 2018-11-13 NOTE — ED NOTES
Aaox3, skin warm and dry, resp unlabored and even, amb with steady gait and grover well and freely. Itchy rash around ears and neck and watery left eye since yest.

## 2018-11-13 NOTE — ED PROVIDER NOTES
"Encounter Date: 11/13/2018       History     Chief Complaint   Patient presents with    Rash     left eye with watery drainage and rash on neck and around ears since yest.    Eye Problem     The history is provided by the patient.   Rash    This is a recurrent problem. The current episode started several days ago. The problem has been unchanged. The problem is associated with new medications. The rash is present on the face and neck. The pain has been constant since onset. Associated symptoms include itching. She has tried nothing for the symptoms.   Eye Problem   Pertinent negatives include no chest pain and no shortness of breath.     Review of patient's allergies indicates:   Allergen Reactions    Shellfish containing products Rash    Penicillins      Past Medical History:   Diagnosis Date    Anticoagulant long-term use     H/O cold sores     Hypertension     Obesity     Psoriasis     Seizures     Stroke      Past Surgical History:   Procedure Laterality Date    APPENDECTOMY      APPENDECTOMY-LAPAROSCOPIC N/A 12/14/2016    Performed by Steven Guevara MD at [facility] OR    TOE AMPUTATION      Right great toe amputated due to infection from "hangnail"     TOE AMPUTATION      right great     Family History   Problem Relation Age of Onset    Cancer Neg Hx      Social History     Tobacco Use    Smoking status: Never Smoker    Smokeless tobacco: Never Used   Substance Use Topics    Alcohol use: No    Drug use: No     Review of Systems   Constitutional: Negative for fever.   HENT: Negative for sore throat.    Respiratory: Negative for shortness of breath.    Cardiovascular: Negative for chest pain.   Gastrointestinal: Negative for nausea.   Genitourinary: Negative for dysuria.   Musculoskeletal: Negative for back pain.   Skin: Positive for itching and rash.   Neurological: Negative for weakness.   Hematological: Does not bruise/bleed easily.       Physical Exam     Initial Vitals [11/13/18 0703]   BP " Pulse Resp Temp SpO2   (!) 137/90 94 20 98.6 °F (37 °C) 100 %      MAP       --         Physical Exam    Nursing note and vitals reviewed.  Constitutional: She appears well-developed and well-nourished. No distress.   HENT:   Head: Normocephalic and atraumatic.   Mouth/Throat: Oropharynx is clear and moist.   Right sided bell's palsy   Eyes: Conjunctivae and EOM are normal. Pupils are equal, round, and reactive to light. Left eye exhibits discharge.   Neck: Normal range of motion. Neck supple.   Cardiovascular: Normal rate, regular rhythm and normal heart sounds. Exam reveals no gallop and no friction rub.    No murmur heard.  Pulmonary/Chest: Breath sounds normal. No respiratory distress. She has no wheezes. She has no rhonchi. She has no rales.   Abdominal: Soft. Bowel sounds are normal. She exhibits no distension and no mass. There is no tenderness. There is no rebound and no guarding.   Musculoskeletal: Normal range of motion. She exhibits no edema or tenderness.   Neurological: She is alert and oriented to person, place, and time. She has normal strength.   Skin: Skin is warm and dry. Rash noted. Rash is urticarial (To the neck and bilateral ears.).   Psychiatric: She has a normal mood and affect. Thought content normal.         ED Course   Procedures  Labs Reviewed - No data to display       Imaging Results    None                               Clinical Impression:       ICD-10-CM ICD-9-CM   1. Pruritic dermatitis L29.9 698.9   2. Acute conjunctivitis of left eye, unspecified acute conjunctivitis type H10.32 372.00           Disposition:   Disposition: Discharged  Condition: Stable                        Jaret House MD  11/13/18 0729

## 2018-11-26 ENCOUNTER — HOSPITAL ENCOUNTER (EMERGENCY)
Facility: HOSPITAL | Age: 33
Discharge: HOME | End: 2018-11-26
Attending: EMERGENCY MEDICINE
Payer: COMMERCIAL

## 2018-11-26 VITALS
HEIGHT: 66 IN | TEMPERATURE: 99 F | OXYGEN SATURATION: 100 % | BODY MASS INDEX: 47.09 KG/M2 | DIASTOLIC BLOOD PRESSURE: 76 MMHG | SYSTOLIC BLOOD PRESSURE: 156 MMHG | RESPIRATION RATE: 20 BRPM | HEART RATE: 85 BPM | WEIGHT: 293 LBS

## 2018-11-26 DIAGNOSIS — H60.63 CHRONIC OTITIS EXTERNA OF BOTH EARS, UNSPECIFIED TYPE: Primary | ICD-10-CM

## 2018-11-26 PROCEDURE — 99283 EMERGENCY DEPT VISIT LOW MDM: CPT

## 2018-11-26 RX ORDER — CEFDINIR 300 MG/1
300 CAPSULE ORAL 2 TIMES DAILY
Qty: 20 CAPSULE | Refills: 0 | Status: SHIPPED | OUTPATIENT
Start: 2018-11-26 | End: 2018-12-06

## 2018-11-26 RX ORDER — CIPROFLOXACIN AND DEXAMETHASONE 3; 1 MG/ML; MG/ML
4 SUSPENSION/ DROPS AURICULAR (OTIC) 2 TIMES DAILY
Qty: 7.5 ML | Refills: 0 | Status: SHIPPED | OUTPATIENT
Start: 2018-11-26 | End: 2019-01-12

## 2018-11-26 NOTE — ED PROVIDER NOTES
"Encounter Date: 11/26/2018       History     Chief Complaint   Patient presents with    Otalgia     2 days harish ear pain      The history is provided by the patient.   Otalgia   This is a recurrent problem. The current episode started two days ago. There is pain in both ears. The problem occurs constantly. The problem has been unchanged. The pain is at a severity of 4/10. Associated symptoms include ear discharge and rash. Pertinent negatives include no headaches, no hearing loss, no rhinorrhea, no sore throat, no abdominal pain, no diarrhea, no vomiting, no neck pain and no cough. Her past medical history is significant for chronic ear infection.     Review of patient's allergies indicates:   Allergen Reactions    Shellfish containing products Rash    Penicillins      Past Medical History:   Diagnosis Date    Anticoagulant long-term use     H/O cold sores     Hypertension     Obesity     Psoriasis     Seizures     Stroke      Past Surgical History:   Procedure Laterality Date    APPENDECTOMY      APPENDECTOMY-LAPAROSCOPIC N/A 12/14/2016    Performed by [facility]a [facility] at [facility]    TOE AMPUTATION      Right great toe amputated due to infection from "hangnail"     TOE AMPUTATION      right great     Family History   Problem Relation Age of Onset    Cancer Neg Hx      Social History     Tobacco Use    Smoking status: Never Smoker    Smokeless tobacco: Never Used   Substance Use Topics    Alcohol use: No    Drug use: No     Review of Systems   HENT: Positive for ear discharge and ear pain. Negative for hearing loss, rhinorrhea and sore throat.    Respiratory: Negative for cough.    Gastrointestinal: Negative for abdominal pain, diarrhea and vomiting.   Musculoskeletal: Negative for neck pain.   Skin: Positive for rash.   Neurological: Negative for headaches.   All other systems reviewed and are negative.      Physical Exam     Initial Vitals [11/26/18 1142]   BP Pulse Resp Temp SpO2   (!) 156/76 " 85 20 98.5 °F (36.9 °C) 100 %      MAP       --         Physical Exam    Nursing note and vitals reviewed.  Constitutional: She appears well-developed and well-nourished. No distress.   HENT:   Head: Normocephalic and atraumatic.   Right Ear: There is drainage. No foreign bodies. No mastoid tenderness. Tympanic membrane is not erythematous.   Left Ear: There is drainage. No foreign bodies. No mastoid tenderness. Tympanic membrane is not erythematous.   Dry scaly rash bilateral ears   Eyes: Conjunctivae and EOM are normal. Pupils are equal, round, and reactive to light.   Neck: Normal range of motion. Neck supple.   Cardiovascular: Normal rate.   Pulmonary/Chest: Breath sounds normal.   Abdominal: Soft. Bowel sounds are normal.   Musculoskeletal: Normal range of motion.   Neurological: She is alert. She has normal strength.   Skin: Skin is warm and dry. Rash noted.   Psychiatric: She has a normal mood and affect. Thought content normal.         ED Course   Procedures  Labs Reviewed - No data to display       Imaging Results    None     12:13 PM - Counseling: Spoke with the patient and discussed todays findings, in addition to providing specific details for the plan of care and counseling regarding the diagnosis and prognosis. Questions are answered at this time. Reviewed old chart, previous tx c Keflex and Ciprodex. Discussed need for ENT follow up pt agrees c plan.                            Clinical Impression:   The encounter diagnosis was Chronic otitis externa of both ears, unspecified type.      Disposition:   Disposition: Discharged  Condition: Stable                        Harry Ochoa MD  11/26/18 4067

## 2018-11-26 NOTE — ED NOTES
Aaox3, skin warm and dry, resp unlabored and even. amb with steady gait and grover well . C/o harish ear pain for 2 days.

## 2019-01-12 ENCOUNTER — HOSPITAL ENCOUNTER (EMERGENCY)
Facility: HOSPITAL | Age: 34
Discharge: HOME | End: 2019-01-12
Attending: EMERGENCY MEDICINE
Payer: COMMERCIAL

## 2019-01-12 VITALS
SYSTOLIC BLOOD PRESSURE: 136 MMHG | RESPIRATION RATE: 18 BRPM | OXYGEN SATURATION: 100 % | HEART RATE: 100 BPM | TEMPERATURE: 98 F | HEIGHT: 66 IN | BODY MASS INDEX: 47.09 KG/M2 | DIASTOLIC BLOOD PRESSURE: 78 MMHG | WEIGHT: 293 LBS

## 2019-01-12 DIAGNOSIS — L40.9 PSORIASIS: Primary | ICD-10-CM

## 2019-01-12 PROBLEM — R76.0 ANTIPHOSPHOLIPID ANTIBODY POSITIVE: Status: ACTIVE | Noted: 2017-03-18

## 2019-01-12 PROCEDURE — 96372 THER/PROPH/DIAG INJ SC/IM: CPT | Mod: ER

## 2019-01-12 PROCEDURE — 63600175 PHARM REV CODE 636 W HCPCS: Mod: ER | Performed by: NURSE PRACTITIONER

## 2019-01-12 PROCEDURE — 99284 EMERGENCY DEPT VISIT MOD MDM: CPT | Mod: 25,ER

## 2019-01-12 PROCEDURE — 25000003 PHARM REV CODE 250: Mod: ER | Performed by: NURSE PRACTITIONER

## 2019-01-12 RX ORDER — DEXAMETHASONE SODIUM PHOSPHATE 4 MG/ML
8 INJECTION, SOLUTION INTRA-ARTICULAR; INTRALESIONAL; INTRAMUSCULAR; INTRAVENOUS; SOFT TISSUE
Status: COMPLETED | OUTPATIENT
Start: 2019-01-12 | End: 2019-01-12

## 2019-01-12 RX ORDER — HYDROXYZINE PAMOATE 50 MG/1
50 CAPSULE ORAL EVERY 6 HOURS PRN
Qty: 20 CAPSULE | Refills: 0 | Status: SHIPPED | OUTPATIENT
Start: 2019-01-12 | End: 2019-03-28

## 2019-01-12 RX ORDER — PREDNISONE 20 MG/1
TABLET ORAL
Qty: 18 TABLET | Refills: 0 | Status: SHIPPED | OUTPATIENT
Start: 2019-01-12 | End: 2019-03-28

## 2019-01-12 RX ORDER — DIPHENHYDRAMINE HCL 25 MG
50 CAPSULE ORAL
Status: COMPLETED | OUTPATIENT
Start: 2019-01-12 | End: 2019-01-12

## 2019-01-12 RX ORDER — BETAMETHASONE DIPROPIONATE 0.5 MG/G
OINTMENT TOPICAL 2 TIMES DAILY
Qty: 45 G | Refills: 0 | Status: SHIPPED | OUTPATIENT
Start: 2019-01-12 | End: 2019-03-28

## 2019-01-12 RX ORDER — HYDROCODONE BITARTRATE AND ACETAMINOPHEN 10; 325 MG/1; MG/1
1 TABLET ORAL
Status: COMPLETED | OUTPATIENT
Start: 2019-01-12 | End: 2019-01-12

## 2019-01-12 RX ADMIN — DIPHENHYDRAMINE HYDROCHLORIDE 50 MG: 25 CAPSULE ORAL at 09:01

## 2019-01-12 RX ADMIN — DEXAMETHASONE SODIUM PHOSPHATE 8 MG: 4 INJECTION, SOLUTION INTRA-ARTICULAR; INTRALESIONAL; INTRAMUSCULAR; INTRAVENOUS; SOFT TISSUE at 09:01

## 2019-01-12 RX ADMIN — HYDROCODONE BITARTRATE AND ACETAMINOPHEN 1 TABLET: 10; 325 TABLET ORAL at 09:01

## 2019-01-13 NOTE — ED PROVIDER NOTES
"Encounter Date: 1/12/2019       History     Chief Complaint   Patient presents with    Psoriasis     Patient reports she is having a flare for 2 days.      The history is provided by the patient.   Rash    This is a chronic problem. The current episode started yesterday. The problem has been gradually worsening. Associated with: psoriasis. Affected Location: generalized. The pain is at a severity of 8/10. The pain has been constant since onset. Associated symptoms include itching and pain. She has tried antihistamines and anti-itch cream for the symptoms. The treatment provided no relief.       PCP:    Kenji Evans MD        Review of patient's allergies indicates:   Allergen Reactions    Shellfish containing products Rash    Penicillins      Past Medical History:   Diagnosis Date    Anticoagulant long-term use     H/O cold sores     Hypertension     Obesity     Psoriasis     Seizures     Stroke      Past Surgical History:   Procedure Laterality Date    APPENDECTOMY      APPENDECTOMY-LAPAROSCOPIC N/A 12/14/2016    Performed by [facility] at [facility]    TOE AMPUTATION      Right great toe amputated due to infection from "hangnail"     TOE AMPUTATION      right great     Family History   Problem Relation Age of Onset    Cancer Neg Hx      Social History     Tobacco Use    Smoking status: Never Smoker    Smokeless tobacco: Never Used   Substance Use Topics    Alcohol use: No    Drug use: No     Review of Systems   Constitutional: Negative for chills and fever.   HENT: Negative for congestion and sore throat.    Respiratory: Negative for cough, chest tightness, shortness of breath and wheezing.    Cardiovascular: Negative for chest pain and palpitations.   Gastrointestinal: Negative for abdominal pain, diarrhea, nausea and vomiting.   Genitourinary: Negative for dysuria.   Musculoskeletal: Negative for back pain and neck pain.   Skin: Positive for itching and rash (psoriasis). "   Neurological: Negative for dizziness, weakness, light-headedness and headaches.   Hematological: Does not bruise/bleed easily.       Physical Exam     Initial Vitals [01/12/19 2016]   BP Pulse Resp Temp SpO2   136/85 100 20 98.4 °F (36.9 °C) 99 %      MAP       --         Physical Exam    Nursing note and vitals reviewed.  Constitutional: She appears well-developed and well-nourished. She is Obese . She is cooperative. She does not appear ill. No distress.   HENT:   Head: Normocephalic and atraumatic.   Right Ear: Hearing and external ear normal.   Left Ear: Hearing and external ear normal.   Nose: Nose normal.   Mouth/Throat: Uvula is midline, oropharynx is clear and moist and mucous membranes are normal.   Eyes: Conjunctivae, EOM and lids are normal. Pupils are equal, round, and reactive to light.   Neck: Trachea normal and normal range of motion. Neck supple.   Cardiovascular: Normal rate, regular rhythm, intact distal pulses and normal pulses.   Pulmonary/Chest: Effort normal. No respiratory distress.   Musculoskeletal: Normal range of motion. She exhibits no edema or tenderness.   Lymphadenopathy:     She has no cervical adenopathy.   Neurological: She is alert and oriented to person, place, and time. She has normal strength. No sensory deficit. Gait normal. GCS eye subscore is 4. GCS verbal subscore is 5. GCS motor subscore is 6.   Neurovascular intact to all extremities.    Skin: Skin is warm and dry. Capillary refill takes less than 2 seconds. Rash (psoriasis - located diffusely) noted. Rash is maculopapular and pustular. There is erythema.   Psychiatric: She has a normal mood and affect. Her speech is normal and behavior is normal. Cognition and memory are normal.         ED Course   Procedures    ED Medications:   Medications   dexamethasone injection 8 mg (8 mg Intramuscular Given 1/12/19 2132)   HYDROcodone-acetaminophen  mg per tablet 1 tablet (1 tablet Oral Given 1/12/19 2132)   diphenhydrAMINE  "capsule 50 mg (50 mg Oral Given 1/12/19 2133)       ED Course Vitals  Vitals:    01/12/19 2016 01/12/19 2205   BP: 136/85 136/78   BP Location: Right arm Right arm   Patient Position: Sitting Sitting   Pulse: 100 100   Resp: 20 18   Temp: 98.4 °F (36.9 °C) 98.1 °F (36.7 °C)   TempSrc: Oral Oral   SpO2: 99% 100%   Weight: 135.1 kg (297 lb 13.5 oz)    Height: 5' 6" (1.676 m)          2145 HOURS RE-EVALUATION & DISPOSITION:   Reassessment at the time of disposition demonstrates that the patient is resting comfortably in no acute distress.  She has remained hemodynamically stable throughout the entire ED visit and is without objective evidence for acute process requiring urgent intervention or hospitalization. I provided counseling to patient with regard to condition, the treatment plan, specific conditions for return, and the importance of follow up.  Answered questions at this time. The patient is stable for discharge.                   Medical Decision Making:   History:   Old Records Summarized: records from clinic visits.                      Clinical Impression:       ICD-10-CM ICD-9-CM   1. Psoriasis L40.9 696.1           Disposition:   Disposition: Discharged  Condition: Stable  I discussed with patient that the evaluation in the emergency department does not suggest any emergent or life threatening medical condition requiring immediate intervention beyond what was provided in the ED, and I believe patient is safe for discharge.  Regardless, an unremarkable evaluation in the ED does not preclude the development or presence of a serious of life threatening condition. As such, patient was instructed to return immediately for any worsening or change in current symptoms. I also discussed the results of my evaluation and diagnosis with patient and she concurs with the evaluation and management plan.  Detailed written and verbal instructions provided to patient and she expressed a verbal understanding of the discharge " instructions and overall management plan. Reiterated the importance of medication administration and safety and advised patient to follow up with primary care provider in 3-5 days or sooner if needed.  Also advised patient to return to the ER for any complications.     Regarding PSORIASIS, patient and/or caregiver was instructed to avoid scratching or rubbing the rash, keep skin moist, protect hands (washing hands regularly and using hand lotion), avoid extreme temperatures or other substances that irritate the skin, and use any medications prescribed especially for the treatment of atopic dermatitis. Advised patient to avoid frequent bathes to prevent drying out skin and recommended patient use sponge baths between regular baths or showers to maintain proper hygiene. Explained to patient that when bathing, they should soak in bath tub with warm water (without bubbles or scented soaps) for 15 minutes allows skin to absorb moisture and pat dry skin with towel and then apply prescribed lotion following bath.  Recommended that the patient and/or caregiver to contact primary care provider for fever, worsening skin rash, or extreme itching.           Follow-up Information     Schedule an appointment as soon as possible for a visit  with Kenji Evans MD.    Specialty:  Family Medicine  Contact information:  [address] [facility] [address]  [phone]             Dermatologist On 1/15/2019.    Why:  As scheduled                   Discharge Medication List as of 1/12/2019  9:33 PM      START taking these medications    Details   betamethasone dipropionate (DIPROLENE) 0.05 % ointment Apply topically 2 (two) times daily., Starting Sat 1/12/2019, Print      hydrOXYzine pamoate (VISTARIL) 50 MG Cap Take 1 capsule (50 mg total) by mouth every 6 (six) hours as needed., Starting Sat 1/12/2019, Print      predniSONE (DELTASONE) 20 MG tablet Take 1 tablet 3 times per day for 3 days, then  Take 1  tablet 2 times per day for 3 days, then   Take 1 tablet daily for 3 days, Print           Ankur Rodrigues, BASILIA  01/12/19 2436

## 2019-03-07 ENCOUNTER — HOSPITAL ENCOUNTER (EMERGENCY)
Facility: HOSPITAL | Age: 34
Discharge: HOME | End: 2019-03-07
Attending: EMERGENCY MEDICINE
Payer: COMMERCIAL

## 2019-03-07 VITALS
DIASTOLIC BLOOD PRESSURE: 90 MMHG | OXYGEN SATURATION: 100 % | BODY MASS INDEX: 47.09 KG/M2 | TEMPERATURE: 98 F | SYSTOLIC BLOOD PRESSURE: 145 MMHG | WEIGHT: 293 LBS | HEART RATE: 78 BPM | HEIGHT: 66 IN | RESPIRATION RATE: 20 BRPM

## 2019-03-07 DIAGNOSIS — H60.513 ACUTE ACTINIC OTITIS EXTERNA, BILATERAL: ICD-10-CM

## 2019-03-07 DIAGNOSIS — H66.92 ACUTE OTITIS MEDIA, LEFT: Primary | ICD-10-CM

## 2019-03-07 PROCEDURE — 99284 EMERGENCY DEPT VISIT MOD MDM: CPT | Mod: ER

## 2019-03-07 RX ORDER — NEOMYCIN SULFATE, POLYMYXIN B SULFATE AND HYDROCORTISONE 10; 3.5; 1 MG/ML; MG/ML; [USP'U]/ML
4 SUSPENSION/ DROPS AURICULAR (OTIC) 3 TIMES DAILY
Qty: 10 ML | Refills: 1 | Status: SHIPPED | OUTPATIENT
Start: 2019-03-07 | End: 2019-03-17

## 2019-03-07 RX ORDER — CLARITHROMYCIN 500 MG/1
500 TABLET, FILM COATED ORAL 2 TIMES DAILY
Qty: 14 TABLET | Refills: 0 | Status: SHIPPED | OUTPATIENT
Start: 2019-03-07 | End: 2019-03-17

## 2019-03-07 NOTE — ED PROVIDER NOTES
"Encounter Date: 3/7/2019       History     Chief Complaint   Patient presents with    Otalgia     harish ear pain since sat  and sore throat     Bilateral ear pain for a few days with slight drainage, mild sore throat, no fever.  Used a few applications of an unknown ear drop.  No recent antibiotics.  Denies any chance of pregnancy.  No other complaints. Has not seen an ear nose and throat doctor.  Apparently does have some recurring ear problems, details not clear.      The history is provided by the patient. No  was used.     Review of patient's allergies indicates:   Allergen Reactions    Shellfish containing products Rash    Penicillins      Past Medical History:   Diagnosis Date    Anticoagulant long-term use     H/O cold sores     Hypertension     Obesity     Psoriasis     Seizures     Stroke      Past Surgical History:   Procedure Laterality Date    APPENDECTOMY      APPENDECTOMY-LAPAROSCOPIC N/A 12/14/2016    Performed by Steven Gue[facility]a MD at [facility] [place]    TOE AMPUTATION      Right great toe amputated due to infection from "hangnail"     TOE AMPUTATION      right great     Family History   Problem Relation Age of Onset    Cancer Neg Hx      Social History     Tobacco Use    Smoking status: Never Smoker    Smokeless tobacco: Never Used   Substance Use Topics    Alcohol use: No    Drug use: No     Review of Systems   Constitutional: Negative for activity change, fatigue and fever.   HENT: Positive for ear discharge, ear pain and sore throat. Negative for congestion, facial swelling, nosebleeds and sinus pressure.    Eyes: Negative for pain, discharge, redness and visual disturbance.   Respiratory: Negative for cough, choking, chest tightness, shortness of breath and wheezing.    Cardiovascular: Negative for chest pain, palpitations and leg swelling.   Gastrointestinal: Negative for abdominal distention, abdominal pain, nausea and vomiting.   Endocrine: Negative for heat " intolerance, polydipsia and polyuria.   Genitourinary: Negative for difficulty urinating, dysuria, flank pain, hematuria and urgency.   Musculoskeletal: Negative for back pain, gait problem, joint swelling and myalgias.   Skin: Negative for color change and rash.   Allergic/Immunologic: Negative for environmental allergies and food allergies.   Neurological: Negative for dizziness, weakness, numbness and headaches.   Hematological: Negative for adenopathy. Does not bruise/bleed easily.   Psychiatric/Behavioral: Negative for agitation and behavioral problems. The patient is not nervous/anxious.    All other systems reviewed and are negative.      Physical Exam     Initial Vitals [03/07/19 0839]   BP Pulse Resp Temp SpO2   (!) 145/90 78 20 98 °F (36.7 °C) 100 %      MAP       --         Physical Exam    Nursing note and vitals reviewed.  Constitutional: She appears well-developed and well-nourished. She is not diaphoretic. No distress.   HENT:   Head: Normocephalic and atraumatic.   Mouth/Throat: No oropharyngeal exudate.   Moderate bilateral otitis externa with minimal canal swelling, dry and cracked skin particularly around the auditory meatus on the left.  Right tympanic membrane is normal.  Left tympanic membrane has thick serous effusion without rupture.  No other findings.   Eyes: Conjunctivae and EOM are normal. Pupils are equal, round, and reactive to light. Right eye exhibits no discharge. Left eye exhibits no discharge. No scleral icterus.   Neck: Normal range of motion. Neck supple. No thyromegaly present. No tracheal deviation present. No JVD present.   Cardiovascular: Normal rate, regular rhythm, normal heart sounds and intact distal pulses. Exam reveals no gallop and no friction rub.    No murmur heard.  Pulmonary/Chest: Breath sounds normal. No stridor. No respiratory distress. She has no wheezes. She has no rhonchi. She has no rales. She exhibits no tenderness.   Abdominal: Soft. Bowel sounds are  normal. She exhibits no distension and no mass. There is no tenderness. There is no rebound and no guarding.   Musculoskeletal: Normal range of motion. She exhibits no edema or tenderness.   Neurological: She is alert and oriented to person, place, and time. She has normal strength.   Skin: Skin is warm and dry. No rash and no abscess noted. No erythema.   Psychiatric: She has a normal mood and affect. Her behavior is normal. Judgment and thought content normal.         ED Course   Procedures  Labs Reviewed - No data to display       Imaging Results    None                               Clinical Impression:       1. Acute otitis media, left    2. Acute actinic otitis externa, bilateral           Disposition:   Disposition: Discharged  Condition: Stable                        Álvaro Elliott MD  03/07/19 4403

## 2019-03-07 NOTE — DISCHARGE INSTRUCTIONS
________________      You have middle ear infection on the left, and outer ear (ear canal) infection on both sides.  Take the antibiotics by mouth and use the antibiotic ear drops as prescribed.  Talk to your doctor soon about a referral to see an ear nose and throat physician for a recheck soon.    ______________

## 2019-03-07 NOTE — ED NOTES
Aaox3, skin warm and dry, resp unlabored and even. amb with steady gait and grover well. Pt c/o harish ear pain- chronic but this episode last few days, cold symptoms and sore throat also.

## 2019-03-28 ENCOUNTER — HOSPITAL ENCOUNTER (EMERGENCY)
Facility: HOSPITAL | Age: 34
Discharge: HOME | End: 2019-03-28
Attending: EMERGENCY MEDICINE
Payer: COMMERCIAL

## 2019-03-28 VITALS
BODY MASS INDEX: 48.22 KG/M2 | HEART RATE: 85 BPM | OXYGEN SATURATION: 100 % | TEMPERATURE: 98 F | WEIGHT: 293 LBS | SYSTOLIC BLOOD PRESSURE: 151 MMHG | RESPIRATION RATE: 18 BRPM | DIASTOLIC BLOOD PRESSURE: 84 MMHG

## 2019-03-28 DIAGNOSIS — M54.2 NECK PAIN: Primary | ICD-10-CM

## 2019-03-28 DIAGNOSIS — I10 HYPERTENSION, UNSPECIFIED TYPE: ICD-10-CM

## 2019-03-28 PROCEDURE — 99284 EMERGENCY DEPT VISIT MOD MDM: CPT | Mod: ER

## 2019-03-28 RX ORDER — CYCLOBENZAPRINE HCL 10 MG
10 TABLET ORAL 3 TIMES DAILY PRN
Qty: 15 TABLET | Refills: 0 | Status: SHIPPED | OUTPATIENT
Start: 2019-03-28 | End: 2019-04-02

## 2019-03-28 RX ORDER — TRAMADOL HYDROCHLORIDE 50 MG/1
50 TABLET ORAL EVERY 6 HOURS PRN
Qty: 20 TABLET | Refills: 0 | Status: SHIPPED | OUTPATIENT
Start: 2019-03-28 | End: 2019-04-07

## 2019-03-28 NOTE — ED PROVIDER NOTES
"Encounter Date: 3/28/2019       History     Chief Complaint   Patient presents with    Neck Pain     for a few days, denies injury     The history is provided by the patient.   Neck Pain    This is a new problem. The current episode started several days ago. The problem occurs daily. The problem has been unchanged. The pain is associated with an unknown factor. The pain is present in the generalized neck. The quality of the pain is described as aching. The pain is at a severity of 6/10. The symptoms are aggravated by twisting. The pain is the same all the time. Pertinent negatives include no photophobia, no visual change, no chest pain, no syncope, no numbness, no weight loss, no headaches, no bowel incontinence, no bladder incontinence, no leg pain, no paresis, no tingling and no weakness. She has tried nothing for the symptoms. The treatment provided no relief.     Review of patient's allergies indicates:   Allergen Reactions    Shellfish containing products Rash    Penicillins      Past Medical History:   Diagnosis Date    Anticoagulant long-term use     H/O cold sores     Hypertension     Obesity     Psoriasis     Seizures     Stroke      Past Surgical History:   Procedure Laterality Date    APPENDECTOMY      APPENDECTOMY-LAPAROSCOPIC N/A 12/14/2016    Performed by [facility] at [facility] OR    TOE AMPUTATION      Right great toe amputated due to infection from "hangnail"     TOE AMPUTATION      right great     Family History   Problem Relation Age of Onset    Cancer Neg Hx      Social History     Tobacco Use    Smoking status: Never Smoker    Smokeless tobacco: Never Used   Substance Use Topics    Alcohol use: No    Drug use: No     Review of Systems   Constitutional: Negative for weight loss.   Eyes: Negative for photophobia.   Cardiovascular: Negative for chest pain and syncope.   Gastrointestinal: Negative for bowel incontinence.   Genitourinary: Negative for bladder incontinence. "   Musculoskeletal: Positive for neck pain.   Neurological: Negative for tingling, weakness, numbness and headaches.   All other systems reviewed and are negative.      Physical Exam     Initial Vitals [03/28/19 1054]   BP Pulse Resp Temp SpO2   (!) 151/84 85 18 98.4 °F (36.9 °C) 100 %      MAP       --         Physical Exam    Nursing note and vitals reviewed.  Constitutional: She appears well-developed and well-nourished. No distress.   HENT:   Head: Normocephalic and atraumatic.   Mouth/Throat: Oropharynx is clear and moist.   Eyes: Conjunctivae and EOM are normal. Pupils are equal, round, and reactive to light.   Neck: Normal range of motion. Neck supple.   Cardiovascular: Normal rate and regular rhythm.   Pulmonary/Chest: Breath sounds normal.   Abdominal: Soft. Bowel sounds are normal.   Musculoskeletal: Normal range of motion.        Cervical back: Normal.        Thoracic back: Normal.        Lumbar back: Normal.   Neurological: She is alert and oriented to person, place, and time. She has normal strength.   Skin: Skin is warm and dry.   Psychiatric: She has a normal mood and affect. Thought content normal.         ED Course   Procedures  Labs Reviewed - No data to display       Imaging Results    None     11:14 AM - Counseling: Spoke with the patient and discussed todays findings, in addition to providing specific details for the plan of care and counseling regarding the diagnosis and prognosis. Questions are answered at this time. GCS 15 N/V intact.                            Clinical Impression:       ICD-10-CM ICD-9-CM   1. Neck pain M54.2 723.1   2. Hypertension, unspecified type I10 401.9         Disposition:   Disposition: Discharged  Condition: Stable                        Harry Ochoa MD  03/28/19 1116

## 2019-04-13 ENCOUNTER — HOSPITAL ENCOUNTER (EMERGENCY)
Facility: HOSPITAL | Age: 34
Discharge: HOME | End: 2019-04-13
Attending: EMERGENCY MEDICINE
Payer: COMMERCIAL

## 2019-04-13 VITALS
DIASTOLIC BLOOD PRESSURE: 88 MMHG | OXYGEN SATURATION: 100 % | TEMPERATURE: 98 F | WEIGHT: 293 LBS | SYSTOLIC BLOOD PRESSURE: 134 MMHG | BODY MASS INDEX: 47.09 KG/M2 | HEART RATE: 86 BPM | HEIGHT: 66 IN | RESPIRATION RATE: 20 BRPM

## 2019-04-13 DIAGNOSIS — L30.9 ECZEMA, UNSPECIFIED TYPE: Primary | ICD-10-CM

## 2019-04-13 PROCEDURE — 99284 EMERGENCY DEPT VISIT MOD MDM: CPT | Mod: ER

## 2019-04-13 RX ORDER — PREDNISONE 20 MG/1
40 TABLET ORAL DAILY
Qty: 10 TABLET | Refills: 0 | Status: SHIPPED | OUTPATIENT
Start: 2019-04-13 | End: 2019-04-18

## 2019-04-13 RX ORDER — BETAMETHASONE DIPROPIONATE 0.5 MG/G
OINTMENT TOPICAL 2 TIMES DAILY
Qty: 45 G | Refills: 0 | Status: SHIPPED | OUTPATIENT
Start: 2019-04-13 | End: 2020-03-30

## 2019-04-13 NOTE — ED PROVIDER NOTES
"Encounter Date: 4/13/2019       History     Chief Complaint   Patient presents with    Rash     flare up since yest hx eczema     The history is provided by the patient.   Rash    This is a recurrent problem. The current episode started yesterday. The problem has been unchanged. Associated with: being in the sun more. The rash is present on the trunk, right arm, right hand, right wrist, right fingers, left arm, left hand, left wrist and left fingers. Pain scale: mild. The pain has been constant (pruritis) since onset. Associated symptoms include itching. Pertinent negatives include no blisters, no pain and no weeping. She has tried steriods and anti-itch cream for the symptoms. The treatment provided mild relief. Risk factors include new environmental exposures.     Review of patient's allergies indicates:   Allergen Reactions    Shellfish containing products Rash    Penicillins      Past Medical History:   Diagnosis Date    Anticoagulant long-term use     Eczema     H/O cold sores     Hypertension     Obesity     Psoriasis     Seizures     Stroke      Past Surgical History:   Procedure Laterality Date    APPENDECTOMY      APPENDECTOMY-LAPAROSCOPIC N/A 12/14/2016    Performed by [facility] at [facility] OR    TOE AMPUTATION      Right great toe amputated due to infection from "hangnail"     TOE AMPUTATION      right great     Family History   Problem Relation Age of Onset    Cancer Neg Hx      Social History     Tobacco Use    Smoking status: Never Smoker    Smokeless tobacco: Never Used   Substance Use Topics    Alcohol use: No    Drug use: No     Review of Systems   Constitutional: Negative for fever.   HENT: Negative for sore throat.    Respiratory: Negative for shortness of breath.    Cardiovascular: Negative for chest pain.   Gastrointestinal: Negative for nausea.   Genitourinary: Negative for dysuria.   Musculoskeletal: Negative for back pain.   Skin: Positive for itching and rash. "   Neurological: Negative for weakness.   Hematological: Does not bruise/bleed easily.   All other systems reviewed and are negative.      Physical Exam     Initial Vitals [04/13/19 0933]   BP Pulse Resp Temp SpO2   134/88 86 20 97.6 °F (36.4 °C) 100 %      MAP       --         Physical Exam    Nursing note and vitals reviewed.  Constitutional: She appears well-developed and well-nourished.   HENT:   Head: Normocephalic and atraumatic.   Right Ear: Hearing normal.   Left Ear: Hearing normal.   Mouth/Throat: Uvula is midline, oropharynx is clear and moist and mucous membranes are normal. No oropharyngeal exudate.   Airway intact.  Able to manage oral secretions.   Eyes: Conjunctivae and EOM are normal. Pupils are equal, round, and reactive to light.   Neck: Normal range of motion. Neck supple. No thyromegaly present.   Cardiovascular: Normal rate, regular rhythm, normal heart sounds and intact distal pulses. Exam reveals no gallop and no friction rub.    No murmur heard.  Pulmonary/Chest: Breath sounds normal. No respiratory distress. She has no wheezes. She has no rhonchi. She exhibits no tenderness.   Abdominal: Soft. Bowel sounds are normal. She exhibits no distension. There is no tenderness. There is no rebound and no guarding.   Musculoskeletal: Normal range of motion. She exhibits no edema or tenderness.   Lymphadenopathy:     She has no cervical adenopathy.   Neurological: She is alert and oriented to person, place, and time. She has normal strength. No cranial nerve deficit or sensory deficit.   Skin: Skin is warm and dry. Capillary refill takes less than 2 seconds. Rash noted. Rash is maculopapular (consistent with chronic eczema flareup).        Psychiatric: She has a normal mood and affect. Her behavior is normal. Judgment and thought content normal.         ED Course   Procedures  Labs Reviewed - No data to display       Imaging Results    None             Vitals:    04/13/19 0933   BP: 134/88   Pulse: 86  "  Resp: 20   Temp: 97.6 °F (36.4 °C)   TempSrc: Oral   SpO2: 100%   Weight: 134.8 kg (297 lb 4.6 oz)   Height: 5' 6" (1.676 m)           Imaging Results    None         Medications - No data to display    10:13 AM - Re-evaluation: The patient is resting comfortably and is in no acute distress. She states that her symptoms have improved after treatment within ER. Discussed test results, shared treatment plan, specific conditions for return, and importance of follow up with patient and family.  She understands and agrees with the plan as discussed. Answered  her questions at this time. She has remained hemodynamically stable throughout the ED course and is appropriate for discharge home.     Regarding ECZEMA, patient and/or caregiver was instructed to avoid scratching or rubbing the rash, keep skin moist, protect hands (washing hands regularly and using hand lotion), avoid extreme temperatures or other substances that irritate the skin, and use any medications prescribed especially for the treatment of atopic dermatitis. Advised patient to avoid frequent bathes to prevent drying out skin and recommended patient use sponge baths between regular baths or showers to maintain proper hygiene. Explained to patient that when bathing, they should soak in bath tub with warm water (without bubbles or scented soaps) for 15 minutes allows skin to absorb moisture and pat dry skin with towel and then apply prescribed lotion following bath.  Recommended that the patient and/or caregiver to contact primary care provider for fever, worsening skin rash, or extreme itching.      Pre-hypertension/Hypertension: The pt has been informed that they may have pre-hypertension or hypertension based on a blood pressure reading in the ED. I recommend that the pt call the PCP listed on their discharge instructions or a physician of their choice this week to arrange f/u for further evaluation of possible pre-hypertension or hypertension. "     Denise Barron was given a handout which discussed their disease process, precautions, and instructions for follow-up and therapy.    Follow-up Information     Kenji Evans MD. Schedule an appointment as soon as possible for a visit in 1 week.    Specialty:  Family Medicine  Contact information:  [address] [facility]  [facility] [address]  [phone]             [facility].    Specialty:  Emergency Medicine  Why:  As needed, If symptoms worsen  Contact information:  [address] [address] [address] [phone]                    Medication List      START taking these medications    betamethasone dipropionate 0.05 % ointment  Commonly known as:  DIPROLENE  Apply topically 2 (two) times daily.     predniSONE 20 MG tablet  Commonly known as:  DELTASONE  Take 2 tablets (40 mg total) by mouth once daily. for 5 days        ASK your doctor about these medications    amLODIPine 10 MG tablet  Commonly known as:  NORVASC     ELIQUIS 2.5 mg Tab  Generic drug:  apixaban     fluticasone 50 mcg/actuation nasal spray  Commonly known as:  FLONASE     hydroCHLOROthiazide 12.5 MG Tab  Commonly known as:  HYDRODIURIL     lamoTRIgine 200 MG tablet  Commonly known as:  LAMICTAL     losartan 25 MG tablet  Commonly known as:  COZAAR           Where to Get Your Medications      You can get these medications from any pharmacy    Bring a paper prescription for each of these medications  · betamethasone dipropionate 0.05 % ointment  · predniSONE 20 MG tablet        Current Discharge Medication List            ED Diagnosis  1. Eczema, unspecified type                             Clinical Impression:       ICD-10-CM ICD-9-CM   1. Eczema, unspecified type L30.9 692.9         Disposition:   Disposition: Discharged  Condition: Stable                        Kevyn Phillips Jr., MD  04/13/19 8006

## 2019-04-13 NOTE — DISCHARGE INSTRUCTIONS
Regarding ECZEMA, patient and/or caregiver was instructed to avoid scratching or rubbing the rash, keep skin moist, protect hands (washing hands regularly and using hand lotion), avoid extreme temperatures or other substances that irritate the skin, and use any medications prescribed especially for the treatment of atopic dermatitis. Advised patient to avoid frequent bathes to prevent drying out skin and recommended patient use sponge baths between regular baths or showers to maintain proper hygiene. Explained to patient that when bathing, they should soak in bath tub with warm water (without bubbles or scented soaps) for 15 minutes allows skin to absorb moisture and pat dry skin with towel and then apply prescribed lotion following bath.  Recommended that the patient and/or caregiver to contact primary care provider for fever, worsening skin rash, or extreme itching.

## 2019-04-13 NOTE — ED NOTES
Aaox3, skin warm and dry, resp unlabored and even. amb with steady gait and grover well. C/o itching rash to arms and truck.

## 2019-07-18 ENCOUNTER — HOSPITAL ENCOUNTER (EMERGENCY)
Facility: HOSPITAL | Age: 34
Discharge: HOME | End: 2019-07-18
Attending: EMERGENCY MEDICINE
Payer: COMMERCIAL

## 2019-07-18 VITALS
BODY MASS INDEX: 49.35 KG/M2 | TEMPERATURE: 99 F | HEART RATE: 91 BPM | OXYGEN SATURATION: 100 % | SYSTOLIC BLOOD PRESSURE: 134 MMHG | DIASTOLIC BLOOD PRESSURE: 87 MMHG | RESPIRATION RATE: 18 BRPM | WEIGHT: 293 LBS

## 2019-07-18 DIAGNOSIS — R09.81 NASAL CONGESTION: ICD-10-CM

## 2019-07-18 DIAGNOSIS — H60.393 OTITIS, EXTERNA, INFECTIVE, BILATERAL: Primary | ICD-10-CM

## 2019-07-18 PROCEDURE — 99284 EMERGENCY DEPT VISIT MOD MDM: CPT | Mod: ER

## 2019-07-18 RX ORDER — NEOMYCIN SULFATE, POLYMYXIN B SULFATE AND HYDROCORTISONE 10; 3.5; 1 MG/ML; MG/ML; [USP'U]/ML
4 SUSPENSION/ DROPS AURICULAR (OTIC) 3 TIMES DAILY
Qty: 6 ML | Refills: 0 | Status: SHIPPED | OUTPATIENT
Start: 2019-07-18 | End: 2019-07-25

## 2019-07-18 RX ORDER — FLUTICASONE PROPIONATE 50 MCG
2 SPRAY, SUSPENSION (ML) NASAL DAILY
Qty: 15 G | Refills: 0 | Status: SHIPPED | OUTPATIENT
Start: 2019-07-18 | End: 2020-03-30 | Stop reason: SDUPTHER

## 2019-07-18 NOTE — ED PROVIDER NOTES
"   History      Chief Complaint   Patient presents with    Ear Drainage     left ear drainage x2 days       Review of patient's allergies indicates:   Allergen Reactions    Shellfish containing products Rash    Penicillins         HPI   HPI    7/18/2019, 12:33 PM   History obtained from the mom      History of Present Illness: Denise Barron is a 34 y.o. female patient who presents to the Emergency Department for bilateral ear pain with drainage for 2 days.  She says she gets this often due to her psoriasis and ear drops fix. She also requests refill of flonase. No fever or vomiting.  No further complaints or concerns at this time.           PCP: Kenji Evans MD       Past Medical History:  Past Medical History:   Diagnosis Date    Anticoagulant long-term use     Eczema     H/O cold sores     Hypertension     Obesity     Psoriasis     Seizures     Stroke          Past Surgical History:  Past Surgical History:   Procedure Laterality Date    APPENDECTOMY      APPENDECTOMY-LAPAROSCOPIC N/A 12/14/2016    Per[facility] at [facility] OR    TOE AMPUTATION      Right great toe amputated due to infection from "hangnail"     TOE AMPUTATION      right great           Family History:  Family History   Problem Relation Age of Onset    Cancer Neg Hx            Social History:  Social History     Tobacco Use    Smoking status: Never Smoker    Smokeless tobacco: Never Used   Substance and Sexual Activity    Alcohol use: No    Drug use: No    Sexual activity: Yes     Partners: Male       ROS   Review of Systems   Constitutional: Negative for appetite change and diaphoresis.   HENT: Positive for ear pain. Negative for facial swelling and trouble swallowing.    Eyes: Negative for discharge and redness.   Respiratory: Negative for choking and stridor.    Cardiovascular: Negative for chest pain and leg swelling.   Gastrointestinal: Negative for diarrhea and vomiting.   Endocrine: Negative for " polydipsia and polyuria.   Genitourinary: Negative for decreased urine volume and difficulty urinating.   Musculoskeletal: Negative for gait problem and neck stiffness.   Skin: Negative for pallor and wound.   Neurological: Negative for syncope and facial asymmetry.   Hematological: Does not bruise/bleed easily.   All other systems reviewed and are negative.  Review of Systems    Physical Exam      Initial Vitals [07/18/19 1224]   BP Pulse Resp Temp SpO2   134/87 91 18 98.7 °F (37.1 °C) 100 %      MAP       --         Physical Exam  Vital signs and nursing notes reviewed.  Constitutional: Patient is in NAD. Not toxic.  Awake and alert. Well-developed and well-nourished.  Head: Atraumatic. Normocephalic.  Eyes: PERRL. EOM intact. Conjunctivae nl. No scleral icterus.  ENT: Mucous membranes are moist. Oropharynx is clear.  Bilateral ears:  Canal ttp and drainage. TMs intact with no erythema.  No mastoid ttp.  Neck: Supple. No JVD. No lymphadenopathy.  No meningismus  Cardiovascular: Regular rate and rhythm. No murmurs, rubs, or gallops. Distal pulses are 2+ and symmetric.  Brisk cap refill, less than 2 sec  Pulmonary/Chest: No respiratory distress. Clear to auscultation bilaterally. No wheezing, rales, or rhonchi.  Abdominal: Soft. Non-distended. No TTP. No rebound, guarding, or rigidity. Good bowel sounds.  Genitourinary: No CVA tenderness  Musculoskeletal: Moves all extremities. No edema.   Skin: Warm and dry.  Neurological: Awake and alert. No acute focal neurological deficits are appreciated.  Psychiatric: Good eye contact.       ED Course          Procedures  ED Vital Signs:  Vitals:    07/18/19 1224   BP: 134/87   Pulse: 91   Resp: 18   Temp: 98.7 °F (37.1 °C)   TempSrc: Oral   SpO2: 100%   Weight: (!) 138.7 kg (305 lb 12.5 oz)                 Imaging Results:  Imaging Results    None            The Emergency Provider reviewed the vital signs and test results, which are outlined above.    ED Discussion              Medication(s) given in the ER:  Medications - No data to display        Follow-up Information     Kenji Evans MD In 2 days.    Specialty:  Family Medicine  Contact information:  [address] [facility]  [place] [address]  [phone]                      Medication List      START taking these medications    neomycin-polymyxin-hydrocortisone 3.5-10,000-1 mg/mL-unit/mL-% otic suspension  Commonly known as:  CORTISPORIN  Place 4 drops into both ears 3 (three) times daily. for 7 days        CHANGE how you take these medications    * fluticasone propionate 50 mcg/actuation nasal spray  Commonly known as:  FLONASE  What changed:  Another medication with the same name was added. Make sure you understand how and when to take each.     * fluticasone propionate 50 mcg/actuation nasal spray  Commonly known as:  FLONASE  2 sprays (100 mcg total) by Each Nare route once daily.  What changed:  You were already taking a medication with the same name, and this prescription was added. Make sure you understand how and when to take each.         * This list has 2 medication(s) that are the same as other medications prescribed for you. Read the directions carefully, and ask your doctor or other care provider to review them with you.            ASK your doctor about these medications    amLODIPine 10 MG tablet  Commonly known as:  NORVASC     betamethasone dipropionate 0.05 % ointment  Commonly known as:  DIPROLENE  Apply topically 2 (two) times daily.     ELIQUIS 2.5 mg Tab  Generic drug:  apixaban     hydroCHLOROthiazide 12.5 MG Tab  Commonly known as:  HYDRODIURIL     lamoTRIgine 200 MG tablet  Commonly known as:  LAMICTAL     losartan 25 MG tablet  Commonly known as:  COZAAR           Where to Get Your Medications      You can get these medications from any pharmacy    Bring a paper prescription for each of these medications  · fluticasone propionate 50 mcg/actuation nasal  spray  · neomycin-polymyxin-hydrocortisone 3.5-10,000-1 mg/mL-unit/mL-% otic suspension           New Prescriptions    FLUTICASONE PROPIONATE (FLONASE) 50 MCG/ACTUATION NASAL SPRAY    2 sprays (100 mcg total) by Each Nare route once daily.    NEOMYCIN-POLYMYXIN-HYDROCORTISONE (CORTISPORIN) 3.5-10,000-1 MG/ML-UNIT/ML-% OTIC SUSPENSION    Place 4 drops into both ears 3 (three) times daily. for 7 days              Medical Decision Making        All findings were reviewed with the family in detail.   All remaining questions and concerns were addressed at that time.  Family has been counseled regarding the need for follow-up as well as the indication to return to the emergency room should new or worrisome developments occur.        MDM           Medication List      START taking these medications    neomycin-polymyxin-hydrocortisone 3.5-10,000-1 mg/mL-unit/mL-% otic suspension  Commonly known as:  CORTISPORIN  Place 4 drops into both ears 3 (three) times daily. for 7 days        CHANGE how you take these medications    * fluticasone propionate 50 mcg/actuation nasal spray  Commonly known as:  FLONASE  What changed:  Another medication with the same name was added. Make sure you understand how and when to take each.     * fluticasone propionate 50 mcg/actuation nasal spray  Commonly known as:  FLONASE  2 sprays (100 mcg total) by Each Nare route once daily.  What changed:  You were already taking a medication with the same name, and this prescription was added. Make sure you understand how and when to take each.         * This list has 2 medication(s) that are the same as other medications prescribed for you. Read the directions carefully, and ask your doctor or other care provider to review them with you.            ASK your doctor about these medications    amLODIPine 10 MG tablet  Commonly known as:  NORVASC     betamethasone dipropionate 0.05 % ointment  Commonly known as:  DIPROLENE  Apply topically 2 (two) times  daily.     ELIQUIS 2.5 mg Tab  Generic drug:  apixaban     hydroCHLOROthiazide 12.5 MG Tab  Commonly known as:  HYDRODIURIL     lamoTRIgine 200 MG tablet  Commonly known as:  LAMICTAL     losartan 25 MG tablet  Commonly known as:  COZAAR           Where to Get Your Medications      You can get these medications from any pharmacy    Bring a paper prescription for each of these medications  · fluticasone propionate 50 mcg/actuation nasal spray  · neomycin-polymyxin-hydrocortisone 3.5-10,000-1 mg/mL-unit/mL-% otic suspension                Clinical Impression:        ICD-10-CM ICD-9-CM   1. Otitis, externa, infective, bilateral H60.393 380.10   2. Nasal congestion R09.81 478.19               Yvrose Montelongo PA-C  07/18/19 1258

## 2019-11-13 ENCOUNTER — HOSPITAL ENCOUNTER (EMERGENCY)
Facility: HOSPITAL | Age: 34
Discharge: HOME | End: 2019-11-13
Attending: EMERGENCY MEDICINE
Payer: COMMERCIAL

## 2019-11-13 VITALS
TEMPERATURE: 98 F | WEIGHT: 293 LBS | SYSTOLIC BLOOD PRESSURE: 140 MMHG | HEART RATE: 88 BPM | DIASTOLIC BLOOD PRESSURE: 98 MMHG | HEIGHT: 66 IN | BODY MASS INDEX: 47.09 KG/M2 | RESPIRATION RATE: 18 BRPM | OXYGEN SATURATION: 99 %

## 2019-11-13 DIAGNOSIS — J06.9 UPPER RESPIRATORY TRACT INFECTION, UNSPECIFIED TYPE: ICD-10-CM

## 2019-11-13 DIAGNOSIS — H10.9 CONJUNCTIVITIS OF BOTH EYES, UNSPECIFIED CONJUNCTIVITIS TYPE: ICD-10-CM

## 2019-11-13 DIAGNOSIS — H60.93 OTITIS EXTERNA OF BOTH EARS, UNSPECIFIED CHRONICITY, UNSPECIFIED TYPE: Primary | ICD-10-CM

## 2019-11-13 PROCEDURE — 99284 EMERGENCY DEPT VISIT MOD MDM: CPT | Mod: ER

## 2019-11-13 RX ORDER — CLOBETASOL PROPIONATE 0.05 G/100ML
SHAMPOO TOPICAL
COMMUNITY
Start: 2019-09-27 | End: 2020-01-22

## 2019-11-13 RX ORDER — CIPROFLOXACIN AND DEXAMETHASONE 3; 1 MG/ML; MG/ML
4 SUSPENSION/ DROPS AURICULAR (OTIC) 2 TIMES DAILY
Qty: 7.5 ML | Refills: 0 | Status: SHIPPED | OUTPATIENT
Start: 2019-11-13 | End: 2020-01-02 | Stop reason: SDUPTHER

## 2019-11-13 RX ORDER — ERYTHROMYCIN 5 MG/G
OINTMENT OPHTHALMIC
Qty: 1 TUBE | Refills: 0 | Status: SHIPPED | OUTPATIENT
Start: 2019-11-13

## 2019-11-13 NOTE — ED PROVIDER NOTES
"Encounter Date: 11/13/2019       History     Chief Complaint   Patient presents with    Ear Drainage     Bilat ear drainage with bilat eye swelling and drainage.     Patient currently presents with concern regarding upper respiratory symptoms. She describes bilateral ear discomfort and drainage along with sinus congestion and rhinorrhea.  There is also swelling in the right eye with drainage noted from both eyes.  Patient notes the rhinorrhea and congestion started about 4-5 days with the remaining complaints starting yesterday.  She is not currently taking any type of medication at home.  Patient denies fever or chills. She denies myalgia nor did she report any vomiting or diarrhea.        Review of patient's allergies indicates:   Allergen Reactions    Shellfish containing products Rash    Neomycin     Penicillins      Past Medical History:   Diagnosis Date    Anticoagulant long-term use     Eczema     H/O cold sores     Hypertension     Obesity     Psoriasis     Seizures     Stroke      Past Surgical History:   Procedure Laterality Date    APPENDECTOMY      TOE AMPUTATION      Right great toe amputated due to infection from "hangnail"     TOE AMPUTATION      right great     Family History   Problem Relation Age of Onset    Cancer Neg Hx      Social History     Tobacco Use    Smoking status: Never Smoker    Smokeless tobacco: Never Used   Substance Use Topics    Alcohol use: No    Drug use: No     Review of Systems   Constitutional: Negative for chills and fever.   HENT: Positive for congestion, ear pain, postnasal drip and rhinorrhea. Negative for sore throat.    Eyes: Positive for discharge, redness and itching. Negative for photophobia, pain and visual disturbance.   Respiratory: Negative for cough, chest tightness, shortness of breath and wheezing.    Cardiovascular: Negative for chest pain, palpitations and leg swelling.   Gastrointestinal: Negative for abdominal pain, constipation, " diarrhea, nausea and vomiting.   Genitourinary: Negative for dysuria, frequency, urgency, vaginal bleeding and vaginal discharge.   Musculoskeletal: Negative for back pain.   Skin: Negative for color change and rash.   Allergic/Immunologic: Negative for immunocompromised state.   Neurological: Negative for dizziness, weakness and numbness.   Hematological: Negative for adenopathy. Does not bruise/bleed easily.   All other systems reviewed and are negative.      Physical Exam     Initial Vitals [11/13/19 0708]   BP Pulse Resp Temp SpO2   (!) 140/98 88 18 98 °F (36.7 °C) 99 %      MAP       --           Physical Exam    Nursing note and vitals reviewed.  Constitutional: She appears well-developed and well-nourished. She is not diaphoretic. No distress.   HENT:   Head: Normocephalic and atraumatic.   Right Ear: Hearing and tympanic membrane normal. There is drainage and tenderness. Tympanic membrane is not injected. Tympanic membrane mobility is normal.   Left Ear: Hearing and tympanic membrane normal. There is drainage and tenderness. Tympanic membrane is not injected. Tympanic membrane mobility is normal.   Eyes: EOM and lids are normal. Pupils are equal, round, and reactive to light. Right eye exhibits exudate. Right eye exhibits no chemosis and no discharge. Left eye exhibits no chemosis, no discharge and no exudate. Right conjunctiva is injected. Left conjunctiva is injected.   Please see RN notes for acuity   Cardiovascular: Normal rate, regular rhythm, normal heart sounds and intact distal pulses.   Pulmonary/Chest: Breath sounds normal. No respiratory distress.   Neurological: She is alert and oriented to person, place, and time. She has normal strength.   Skin: Skin is warm and dry.         ED Course   Procedures  Labs Reviewed - No data to display       Imaging Results    None          Medical Decision Making:   ED Management:  All findings were reviewed with the patient/family in detail along with the  diagnoses of URI with bilateral EO and conjunctivitis.  I see no indication of an emergent process beyond that addressed during our encounter but have duly counseled the patient/family regarding the need for prompt follow-up as well as the indications that should prompt immediate return to the emergency room should new or worrisome developments occur.  The patient has additionally been provided with printed information regarding diagnosis as well as instructions regarding follow up and any medications intended to manage the patient's aforementioned conditions.  The patient/family communicates understanding of all this information and all remaining questions and concerns were addressed at this time.                                       Clinical Impression:       ICD-10-CM ICD-9-CM   1. Otitis externa of both ears, unspecified chronicity, unspecified type H60.93 380.10   2. Conjunctivitis of both eyes, unspecified conjunctivitis type H10.9 372.30   3. Upper respiratory tract infection, unspecified type J06.9 465.9                             Nick Emanuel MD  11/13/19 0804

## 2019-11-25 ENCOUNTER — HOSPITAL ENCOUNTER (EMERGENCY)
Facility: HOSPITAL | Age: 34
Discharge: HOME | End: 2019-11-25
Attending: EMERGENCY MEDICINE
Payer: COMMERCIAL

## 2019-11-25 VITALS
RESPIRATION RATE: 25 BRPM | HEART RATE: 72 BPM | DIASTOLIC BLOOD PRESSURE: 73 MMHG | WEIGHT: 280 LBS | SYSTOLIC BLOOD PRESSURE: 128 MMHG | OXYGEN SATURATION: 100 % | BODY MASS INDEX: 45.19 KG/M2 | TEMPERATURE: 98 F

## 2019-11-25 DIAGNOSIS — Z86.73 HISTORY OF STROKE: ICD-10-CM

## 2019-11-25 DIAGNOSIS — Z87.898 HISTORY OF SEIZURES: Primary | ICD-10-CM

## 2019-11-25 DIAGNOSIS — R20.0 RIGHT SIDED NUMBNESS: ICD-10-CM

## 2019-11-25 DIAGNOSIS — N30.00 ACUTE CYSTITIS WITHOUT HEMATURIA: ICD-10-CM

## 2019-11-25 LAB
ALBUMIN SERPL BCP-MCNC: 3.2 G/DL (ref 3.5–5.2)
ALP SERPL-CCNC: 85 U/L (ref 55–135)
ALT SERPL W/O P-5'-P-CCNC: 15 U/L (ref 10–44)
AMPHET+METHAMPHET UR QL: NEGATIVE
ANION GAP SERPL CALC-SCNC: 7 MMOL/L (ref 8–16)
AST SERPL-CCNC: 11 U/L (ref 10–40)
BACTERIA #/AREA URNS AUTO: ABNORMAL /HPF
BARBITURATES UR QL SCN>200 NG/ML: NEGATIVE
BASOPHILS # BLD AUTO: 0.02 K/UL (ref 0–0.2)
BASOPHILS NFR BLD: 0.2 % (ref 0–1.9)
BENZODIAZ UR QL SCN>200 NG/ML: NEGATIVE
BILIRUB SERPL-MCNC: 0.3 MG/DL (ref 0.1–1)
BILIRUB UR QL STRIP: NEGATIVE
BUN SERPL-MCNC: 15 MG/DL (ref 6–20)
BZE UR QL SCN: NEGATIVE
CALCIUM SERPL-MCNC: 8.7 MG/DL (ref 8.7–10.5)
CANNABINOIDS UR QL SCN: NEGATIVE
CHLORIDE SERPL-SCNC: 107 MMOL/L (ref 95–110)
CLARITY UR REFRACT.AUTO: CLEAR
CO2 SERPL-SCNC: 29 MMOL/L (ref 23–29)
COLOR UR AUTO: ABNORMAL
CREAT SERPL-MCNC: 0.8 MG/DL (ref 0.5–1.4)
CREAT UR-MCNC: 197.4 MG/DL (ref 15–325)
DIFFERENTIAL METHOD: ABNORMAL
EOSINOPHIL # BLD AUTO: 0.1 K/UL (ref 0–0.5)
EOSINOPHIL NFR BLD: 0.4 % (ref 0–8)
ERYTHROCYTE [DISTWIDTH] IN BLOOD BY AUTOMATED COUNT: 17.5 % (ref 11.5–14.5)
EST. GFR  (AFRICAN AMERICAN): >60 ML/MIN/1.73 M^2
EST. GFR  (NON AFRICAN AMERICAN): >60 ML/MIN/1.73 M^2
ETHANOL SERPL-MCNC: <10 MG/DL
GLUCOSE SERPL-MCNC: 91 MG/DL (ref 70–110)
GLUCOSE UR QL STRIP: NEGATIVE
HCT VFR BLD AUTO: 36.7 % (ref 37–48.5)
HGB BLD-MCNC: 10.5 G/DL (ref 12–16)
HGB UR QL STRIP: ABNORMAL
IMM GRANULOCYTES # BLD AUTO: 0.04 K/UL (ref 0–0.04)
IMM GRANULOCYTES NFR BLD AUTO: 0.4 % (ref 0–0.5)
KETONES UR QL STRIP: NEGATIVE
LEUKOCYTE ESTERASE UR QL STRIP: ABNORMAL
LYMPHOCYTES # BLD AUTO: 2.5 K/UL (ref 1–4.8)
LYMPHOCYTES NFR BLD: 21.9 % (ref 18–48)
MCH RBC QN AUTO: 20.4 PG (ref 27–31)
MCHC RBC AUTO-ENTMCNC: 28.6 G/DL (ref 32–36)
MCV RBC AUTO: 71 FL (ref 82–98)
METHADONE UR QL SCN>300 NG/ML: NEGATIVE
MICROSCOPIC COMMENT: ABNORMAL
MONOCYTES # BLD AUTO: 0.9 K/UL (ref 0.3–1)
MONOCYTES NFR BLD: 7.8 % (ref 4–15)
NEUTROPHILS # BLD AUTO: 7.9 K/UL (ref 1.8–7.7)
NEUTROPHILS NFR BLD: 69.3 % (ref 38–73)
NITRITE UR QL STRIP: NEGATIVE
NRBC BLD-RTO: 0 /100 WBC
OPIATES UR QL SCN: NEGATIVE
PCP UR QL SCN>25 NG/ML: NEGATIVE
PH UR STRIP: 6 [PH] (ref 5–8)
PLATELET # BLD AUTO: 491 K/UL (ref 150–350)
PMV BLD AUTO: 10.7 FL (ref 9.2–12.9)
POTASSIUM SERPL-SCNC: 3.9 MMOL/L (ref 3.5–5.1)
PROT SERPL-MCNC: 7.2 G/DL (ref 6–8.4)
PROT UR QL STRIP: NEGATIVE
RBC # BLD AUTO: 5.15 M/UL (ref 4–5.4)
RBC #/AREA URNS AUTO: 1 /HPF (ref 0–4)
SODIUM SERPL-SCNC: 143 MMOL/L (ref 136–145)
SP GR UR STRIP: >=1.03 (ref 1–1.03)
TOXICOLOGY INFORMATION: NORMAL
TROPONIN I SERPL DL<=0.01 NG/ML-MCNC: <0.006 NG/ML (ref 0–0.03)
URN SPEC COLLECT METH UR: ABNORMAL
UROBILINOGEN UR STRIP-ACNC: NEGATIVE EU/DL
WBC # BLD AUTO: 11.37 K/UL (ref 3.9–12.7)
WBC #/AREA URNS AUTO: 35 /HPF (ref 0–5)

## 2019-11-25 PROCEDURE — 85025 COMPLETE CBC W/AUTO DIFF WBC: CPT | Mod: ER

## 2019-11-25 PROCEDURE — 84484 ASSAY OF TROPONIN QUANT: CPT | Mod: ER

## 2019-11-25 PROCEDURE — 25000003 PHARM REV CODE 250: Mod: ER | Performed by: EMERGENCY MEDICINE

## 2019-11-25 PROCEDURE — 81000 URINALYSIS NONAUTO W/SCOPE: CPT | Mod: 59,ER

## 2019-11-25 PROCEDURE — 80053 COMPREHEN METABOLIC PANEL: CPT | Mod: ER

## 2019-11-25 PROCEDURE — 80307 DRUG TEST PRSMV CHEM ANLYZR: CPT | Mod: ER

## 2019-11-25 PROCEDURE — 93010 EKG 12-LEAD: ICD-10-PCS | Mod: ,,, | Performed by: NUCLEAR MEDICINE

## 2019-11-25 PROCEDURE — 80320 DRUG SCREEN QUANTALCOHOLS: CPT | Mod: ER

## 2019-11-25 PROCEDURE — 99284 EMERGENCY DEPT VISIT MOD MDM: CPT | Mod: 25,ER

## 2019-11-25 PROCEDURE — 93005 ELECTROCARDIOGRAM TRACING: CPT | Mod: ER

## 2019-11-25 PROCEDURE — 93010 ELECTROCARDIOGRAM REPORT: CPT | Mod: ,,, | Performed by: NUCLEAR MEDICINE

## 2019-11-25 PROCEDURE — 87086 URINE CULTURE/COLONY COUNT: CPT

## 2019-11-25 RX ORDER — SULFAMETHOXAZOLE AND TRIMETHOPRIM 800; 160 MG/1; MG/1
1 TABLET ORAL 2 TIMES DAILY
Qty: 6 TABLET | Refills: 0 | Status: SHIPPED | OUTPATIENT
Start: 2019-11-25 | End: 2019-11-28

## 2019-11-25 RX ORDER — SULFAMETHOXAZOLE AND TRIMETHOPRIM 800; 160 MG/1; MG/1
1 TABLET ORAL
Status: COMPLETED | OUTPATIENT
Start: 2019-11-25 | End: 2019-11-25

## 2019-11-25 RX ADMIN — SULFAMETHOXAZOLE AND TRIMETHOPRIM 1 TABLET: 800; 160 TABLET ORAL at 02:11

## 2019-11-25 NOTE — ED NOTES
Informed patient to notify when need assistance to go to restroom due to pending urine specimen needed. Pt states not able to go at this moment, but will call when ready.

## 2019-11-25 NOTE — ED PROVIDER NOTES
"Encounter Date: 11/25/2019       History     Chief Complaint   Patient presents with    Seizures     pt states she feels like she is going to have a seizure. Gets right sided numbness prior to seizure      Patient currently presents with concern regarding R-sided numbness.  This onset just PTA and is localized to the RUE and RLE.  Symptoms have since resolved after a duration of about 15-20 min.  Patient reports that these symptoms are typically present just prior to one of her seizures though on this occasion she had no seizure.  Last seizure activity was about 2 weeks ago.  PMH additionally notable for prior CVA leaving the patient with mild RUE and RLE weakness, R sided lower facial weakness, and L sided upper facial weakness.  None of these deficits appear to have been affected by this event.          Review of patient's allergies indicates:   Allergen Reactions    Shellfish containing products Rash    Neomycin     Penicillins      Past Medical History:   Diagnosis Date    Anticoagulant long-term use     Eczema     H/O cold sores     Hypertension     Obesity     Psoriasis     Seizures     Stroke      Past Surgical History:   Procedure Laterality Date    APPENDECTOMY      TOE AMPUTATION      Right great toe amputated due to infection from "hangnail"     TOE AMPUTATION      right great     Family History   Problem Relation Age of Onset    Cancer Neg Hx      Social History     Tobacco Use    Smoking status: Never Smoker    Smokeless tobacco: Never Used   Substance Use Topics    Alcohol use: No    Drug use: No     Review of Systems   Constitutional: Negative for chills and fever.   HENT: Negative for congestion and rhinorrhea.    Respiratory: Negative for cough, chest tightness, shortness of breath and wheezing.    Cardiovascular: Negative for chest pain, palpitations and leg swelling.   Gastrointestinal: Negative for abdominal pain, constipation, diarrhea, nausea and vomiting.   Genitourinary: " Negative for dysuria, frequency, urgency, vaginal bleeding and vaginal discharge.   Skin: Negative for color change and rash.   Allergic/Immunologic: Negative for immunocompromised state.   Neurological: Positive for numbness. Negative for dizziness, speech difficulty, weakness and headaches.   Hematological: Negative for adenopathy. Does not bruise/bleed easily.   All other systems reviewed and are negative.    Physical Exam     Initial Vitals   BP Pulse Resp Temp SpO2   11/25/19 0011 11/25/19 0011 11/25/19 0011 11/25/19 0013 11/25/19 0011   122/64 80 (!) 30 98.4 °F (36.9 °C) 99 %      MAP       --                Vitals:    11/25/19 0011 11/25/19 0013 11/25/19 0017 11/25/19 0032   BP: 122/64 122/74  123/69   Pulse: 80 76 84 80   Resp: (!) 30 20  (!) 27   Temp:  98.4 °F (36.9 °C)     TempSrc:  Oral     SpO2: 99% 99%  99%   Weight:  127 kg (280 lb)      11/25/19 0054 11/25/19 0120   BP: (!) 117/58 125/69   Pulse: 73 79   Resp: (!) 27 (!) 30   Temp:     TempSrc:     SpO2: 100% 100%   Weight:       Physical Exam    Nursing note and vitals reviewed.  Constitutional: She appears well-developed and well-nourished. She is not diaphoretic. No distress.   HENT:   Head: Normocephalic and atraumatic.   Right Ear: External ear normal.   Left Ear: External ear normal.   Nose: Nose normal.   Mouth/Throat: Oropharynx is clear and moist.   Eyes: Conjunctivae and EOM are normal. Pupils are equal, round, and reactive to light. No scleral icterus.   Neck: Neck supple. No tracheal deviation present. No JVD present.   Cardiovascular: Normal rate, regular rhythm, normal heart sounds and intact distal pulses. Exam reveals no gallop and no friction rub.    No murmur heard.  Pulmonary/Chest: Breath sounds normal. No respiratory distress. She has no wheezes. She has no rhonchi. She has no rales.   Abdominal: Soft. Bowel sounds are normal. She exhibits no distension. There is no tenderness.   Musculoskeletal: Normal range of motion. She  exhibits no edema.   Neurological: She is alert and oriented to person, place, and time. GCS score is 15. GCS eye subscore is 4. GCS verbal subscore is 5. GCS motor subscore is 6.   Chronic upper left facial weakness  Chronic lower right facial weakness  Chronic mild 4/5 RUE and RLE weakness  B finger to nose normal     Skin: Skin is warm and dry. No rash noted.   Psychiatric: She has a normal mood and affect. Her behavior is normal.       ED Course   Procedures  Labs Reviewed   CBC W/ AUTO DIFFERENTIAL - Abnormal; Notable for the following components:       Result Value    Hemoglobin 10.5 (*)     Hematocrit 36.7 (*)     Mean Corpuscular Volume 71 (*)     Mean Corpuscular Hemoglobin 20.4 (*)     Mean Corpuscular Hemoglobin Conc 28.6 (*)     RDW 17.5 (*)     Platelets 491 (*)     Gran # (ANC) 7.9 (*)     All other components within normal limits   COMPREHENSIVE METABOLIC PANEL - Abnormal; Notable for the following components:    Albumin 3.2 (*)     Anion Gap 7 (*)     All other components within normal limits   URINALYSIS, REFLEX TO URINE CULTURE - Abnormal; Notable for the following components:    Specific Gravity, UA >=1.030 (*)     Occult Blood UA Trace (*)     Leukocytes, UA 1+ (*)     All other components within normal limits    Narrative:     Preferred Collection Type->Urine, Clean Catch   URINALYSIS MICROSCOPIC - Abnormal; Notable for the following components:    WBC, UA 35 (*)     Bacteria Many (*)     All other components within normal limits    Narrative:     Preferred Collection Type->Urine, Clean Catch   CULTURE, URINE   ALCOHOL,MEDICAL (ETHANOL)   DRUG SCREEN PANEL, URINE EMERGENCY    Narrative:     Preferred Collection Type->Urine, Clean Catch   TROPONIN I     EKG Readings: (Independently Interpreted)   Initial Reading: No STEMI. Rhythm: Normal Sinus Rhythm. Heart Rate: 79. Ectopy: No Ectopy. Conduction: Normal. Axis: Normal.       Imaging Results          CT Head Without Contrast (In process)  Result  time 11/25/19 00:56:04               X-Ray Chest AP Portable (In process)               X-Rays: Other Radiology Reports: CT Head:  No acute intracranial hemorrhage or cortical ischemia.  Left frontal/parietal mild chronic encephalomalacia   Independently Interpreted Readings:   Chest X-Ray: Normal heart size.  No infiltrates.  No acute abnormalities.     Medical Decision Making:   ED Management:  All findings were reviewed with the patient/family in detail.  Symptoms remain gone throughout the ED encounter.  Symptoms appear to be chronically recurring.  I see no indication of an emergent process beyond that addressed during our encounter but have duly counseled the patient/family regarding the need for prompt follow-up as well as the indications that should prompt immediate return to the emergency room should new or worrisome developments occur.  The patient has additionally been provided with printed information regarding diagnosis as well as instructions regarding follow up and any medications intended to manage the patient's aforementioned conditions.  The patient/family communicates understanding of all this information and all remaining questions and concerns were addressed at this time.                                       Clinical Impression:       ICD-10-CM ICD-9-CM   1. History of seizures Z87.898 V12.49   2. Right sided numbness R20.0 782.0   3. History of stroke Z86.73 V12.54   4. Acute cystitis without hematuria N30.00 595.0                             Nikc Emanuel MD  11/25/19 020

## 2019-11-25 NOTE — ED NOTES
Patient ambulatory to restroom from bed w/o any assistance. Steady gait observed. No weakness noted or stated at this time.

## 2019-11-27 LAB
BACTERIA UR CULT: NORMAL
BACTERIA UR CULT: NORMAL

## 2020-01-04 PROBLEM — H60.313 DIFFUSE OTITIS EXTERNA OF BOTH EARS: Status: ACTIVE | Noted: 2020-01-04

## 2020-01-19 ENCOUNTER — HOSPITAL ENCOUNTER (EMERGENCY)
Facility: HOSPITAL | Age: 35
Discharge: HOME | End: 2020-01-19
Attending: FAMILY MEDICINE
Payer: COMMERCIAL

## 2020-01-19 VITALS
HEART RATE: 101 BPM | BODY MASS INDEX: 50.39 KG/M2 | SYSTOLIC BLOOD PRESSURE: 169 MMHG | OXYGEN SATURATION: 98 % | TEMPERATURE: 98 F | RESPIRATION RATE: 18 BRPM | WEIGHT: 293 LBS | DIASTOLIC BLOOD PRESSURE: 85 MMHG

## 2020-01-19 DIAGNOSIS — L40.9 PSORIASIS: Primary | ICD-10-CM

## 2020-01-19 PROCEDURE — 96372 THER/PROPH/DIAG INJ SC/IM: CPT | Mod: ER

## 2020-01-19 PROCEDURE — 63600175 PHARM REV CODE 636 W HCPCS: Mod: ER | Performed by: FAMILY MEDICINE

## 2020-01-19 PROCEDURE — 99284 EMERGENCY DEPT VISIT MOD MDM: CPT | Mod: 25,ER

## 2020-01-19 RX ORDER — KETOROLAC TROMETHAMINE 30 MG/ML
30 INJECTION, SOLUTION INTRAMUSCULAR; INTRAVENOUS
Status: COMPLETED | OUTPATIENT
Start: 2020-01-19 | End: 2020-01-19

## 2020-01-19 RX ADMIN — KETOROLAC TROMETHAMINE 30 MG: 30 INJECTION, SOLUTION INTRAMUSCULAR at 10:01

## 2020-01-20 ENCOUNTER — HOSPITAL ENCOUNTER (EMERGENCY)
Facility: HOSPITAL | Age: 35
Discharge: HOME | End: 2020-01-20
Attending: FAMILY MEDICINE
Payer: COMMERCIAL

## 2020-01-20 VITALS
RESPIRATION RATE: 20 BRPM | OXYGEN SATURATION: 98 % | BODY MASS INDEX: 50.44 KG/M2 | DIASTOLIC BLOOD PRESSURE: 96 MMHG | SYSTOLIC BLOOD PRESSURE: 141 MMHG | WEIGHT: 293 LBS | TEMPERATURE: 98 F | HEART RATE: 102 BPM
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
## 2020-01-20 DIAGNOSIS — S29.011A MUSCLE STRAIN OF CHEST WALL, INITIAL ENCOUNTER: Primary | ICD-10-CM

## 2020-01-20 PROCEDURE — 99284 EMERGENCY DEPT VISIT MOD MDM: CPT | Mod: ER

## 2020-01-20 PROCEDURE — 25000003 PHARM REV CODE 250: Mod: ER | Performed by: PHYSICIAN ASSISTANT

## 2020-01-20 RX ORDER — DICLOFENAC SODIUM 50 MG/1
50 TABLET, DELAYED RELEASE ORAL 2 TIMES DAILY
Qty: 20 TABLET | Refills: 0 | Status: SHIPPED | OUTPATIENT
Start: 2020-01-20

## 2020-01-20 RX ORDER — METHOCARBAMOL 500 MG/1
500 TABLET, FILM COATED ORAL
Status: COMPLETED | OUTPATIENT
Start: 2020-01-20 | End: 2020-01-20

## 2020-01-20 RX ORDER — METHOCARBAMOL 500 MG/1
1000 TABLET, FILM COATED ORAL 3 TIMES DAILY
Qty: 30 TABLET | Refills: 0 | Status: SHIPPED | OUTPATIENT
Start: 2020-01-20 | End: 2020-01-25 | Stop reason: SDUPTHER

## 2020-01-20 RX ADMIN — METHOCARBAMOL TABLETS 500 MG: 500 TABLET, COATED ORAL at 09:01

## 2020-01-20 NOTE — DISCHARGE INSTRUCTIONS
Please apply barrier cream and her triamcinolone cream that is prescribed for psoriasis.  I would reschedule your appointment and try to call tomorrow for her primary care physician.

## 2020-01-20 NOTE — ED PROVIDER NOTES
"Encounter Date: 1/19/2020       History     Chief Complaint   Patient presents with    Psoriasis     Dx with psoriasis, pt reports exaserbation of symptoms to bilateral arms and legs     This is a 34-year-old  female with past medical history of eczema, psoriasis who presents to the emergency department for psoriasis flare.  Patient states for the last 2-3 days the back of her right knee has been drier than usual and starting to hurt.  States that "the skin is cracking."  States she has been taking her triamcinolone cream without relief.  Denies any moisturizer cream or barrier cream use.  Denies fevers chills. Denies itching.  Has not gone to primary care.  Has not taken anything for pain.        Review of patient's allergies indicates:   Allergen Reactions    Shellfish containing products Rash    Neomycin     Penicillins      Past Medical History:   Diagnosis Date    Anticoagulant long-term use     Eczema     H/O cold sores     Hypertension     Obesity     Psoriasis     Seizures     Stroke      Past Surgical History:   Procedure Laterality Date    APPENDECTOMY      TOE AMPUTATION      Right great toe amputated due to infection from "hangnail"     TOE AMPUTATION      right great     Family History   Problem Relation Age of Onset    Cancer Neg Hx      Social History     Tobacco Use    Smoking status: Never Smoker    Smokeless tobacco: Never Used   Substance Use Topics    Alcohol use: No    Drug use: No     Review of Systems   Constitutional: Negative for chills, diaphoresis and fever.   HENT: Negative for congestion, postnasal drip, rhinorrhea, sneezing and sore throat.    Eyes: Negative for visual disturbance.   Respiratory: Negative for cough, chest tightness and shortness of breath.    Cardiovascular: Negative for chest pain, palpitations and leg swelling.   Gastrointestinal: Negative for abdominal pain, constipation, diarrhea, nausea and vomiting.   Genitourinary: Negative for " dysuria, frequency, hematuria and urgency.   Musculoskeletal: Negative for back pain, gait problem and myalgias.   Skin: Positive for rash.   Neurological: Negative for weakness, light-headedness, numbness and headaches.   Hematological: Does not bruise/bleed easily.   All other systems reviewed and are negative.      Physical Exam     Initial Vitals [01/19/20 2158]   BP Pulse Resp Temp SpO2   (!) 169/85 101 18 97.6 °F (36.4 °C) 98 %      MAP       --         Physical Exam    Nursing note and vitals reviewed.  Constitutional: She appears well-developed and well-nourished. She is not diaphoretic. No distress.   HENT:   Head: Normocephalic.   Right Ear: External ear normal.   Left Ear: External ear normal.   Mouth/Throat: Oropharynx is clear and moist.   Eyes: Conjunctivae and EOM are normal. Pupils are equal, round, and reactive to light.   Neck: Normal range of motion. Neck supple. No JVD present.   Cardiovascular: Normal rate, regular rhythm and normal heart sounds.   Pulmonary/Chest: Breath sounds normal. No respiratory distress. She has no wheezes.   Abdominal: Soft. Bowel sounds are normal. She exhibits no distension. There is no tenderness. There is no rebound and no guarding.   Musculoskeletal: Normal range of motion. She exhibits no edema or tenderness.   Neurological: She is alert and oriented to person, place, and time. She has normal strength. No cranial nerve deficit or sensory deficit.   Skin: Skin is warm and dry. Capillary refill takes less than 2 seconds. Rash noted.   Right popliteal region:  Dry skin with desquamification.  No silver scaling scaling.  Plus two popliteal pulse and sensation is intact    In general, patient's skin is dry with excoriations noted bilateral popliteal region and upper extremities. No purulent discharge or erythema   Psychiatric: She has a normal mood and affect. Thought content normal.         ED Course   Procedures  Labs Reviewed - No data to display       Imaging  "Results    None                             .  Medications   ketorolac injection 30 mg (30 mg Intramuscular Given 1/19/20 2209)     Orders Placed This Encounter   Procedures    Nursing communication- barrier/moisture cream                  Clinical Impression:       ICD-10-CM ICD-9-CM   1. Psoriasis L40.9 696.1     Follow-up Information     Kenji Evans MD. Schedule an appointment as soon as possible for a visit in 1 day.    Specialty:  Family Medicine  Contact information:  [address] [facility]  [place] [address] [phone]                     Disposition:   Disposition: Discharged  Condition: Stable    Portions of this note may have been created with voice recognition software. Occasional "wrong-word" or "sound-a-like" substitutions may have occurred due to the inherent limitations of voice recognition software. Please, read the note carefully and recognize, using context, where substitutions have occurred.                      Mayra Scott MD  01/20/20 0155    "
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
## 2020-07-13 ENCOUNTER — HOSPITAL ENCOUNTER (EMERGENCY)
Facility: HOSPITAL | Age: 35
Discharge: HOME | End: 2020-07-13
Attending: EMERGENCY MEDICINE
Payer: COMMERCIAL

## 2020-07-13 VITALS
HEART RATE: 82 BPM | SYSTOLIC BLOOD PRESSURE: 142 MMHG | RESPIRATION RATE: 18 BRPM | WEIGHT: 293 LBS | OXYGEN SATURATION: 98 % | TEMPERATURE: 98 F | HEIGHT: 66 IN | DIASTOLIC BLOOD PRESSURE: 87 MMHG | BODY MASS INDEX: 47.09 KG/M2

## 2020-07-13 DIAGNOSIS — K52.9 GASTROENTERITIS: Primary | ICD-10-CM

## 2020-07-13 DIAGNOSIS — H60.93 OTITIS EXTERNA OF BOTH EARS, UNSPECIFIED CHRONICITY, UNSPECIFIED TYPE: ICD-10-CM

## 2020-07-13 LAB
ALBUMIN SERPL BCP-MCNC: 3.5 G/DL (ref 3.5–5.2)
ALP SERPL-CCNC: 76 U/L (ref 55–135)
ALT SERPL W/O P-5'-P-CCNC: 13 U/L (ref 10–44)
ANION GAP SERPL CALC-SCNC: 11 MMOL/L (ref 8–16)
AST SERPL-CCNC: 13 U/L (ref 10–40)
B-HCG UR QL: NEGATIVE
BACTERIA #/AREA URNS AUTO: ABNORMAL /HPF
BASOPHILS # BLD AUTO: 0.02 K/UL (ref 0–0.2)
BASOPHILS NFR BLD: 0.2 % (ref 0–1.9)
BILIRUB SERPL-MCNC: 0.3 MG/DL (ref 0.1–1)
BILIRUB UR QL STRIP: NEGATIVE
BUN SERPL-MCNC: 9 MG/DL (ref 6–20)
CALCIUM SERPL-MCNC: 9.1 MG/DL (ref 8.7–10.5)
CHLORIDE SERPL-SCNC: 107 MMOL/L (ref 95–110)
CLARITY UR REFRACT.AUTO: CLEAR
CO2 SERPL-SCNC: 19 MMOL/L (ref 23–29)
COLOR UR AUTO: YELLOW
CREAT SERPL-MCNC: 0.8 MG/DL (ref 0.5–1.4)
DIFFERENTIAL METHOD: ABNORMAL
EOSINOPHIL # BLD AUTO: 0.1 K/UL (ref 0–0.5)
EOSINOPHIL NFR BLD: 0.5 % (ref 0–8)
ERYTHROCYTE [DISTWIDTH] IN BLOOD BY AUTOMATED COUNT: 17 % (ref 11.5–14.5)
EST. GFR  (AFRICAN AMERICAN): >60 ML/MIN/1.73 M^2
EST. GFR  (NON AFRICAN AMERICAN): >60 ML/MIN/1.73 M^2
GLUCOSE SERPL-MCNC: 100 MG/DL (ref 70–110)
GLUCOSE UR QL STRIP: NEGATIVE
HCT VFR BLD AUTO: 38.9 % (ref 37–48.5)
HGB BLD-MCNC: 11.1 G/DL (ref 12–16)
HGB UR QL STRIP: ABNORMAL
IMM GRANULOCYTES # BLD AUTO: 0.04 K/UL (ref 0–0.04)
IMM GRANULOCYTES NFR BLD AUTO: 0.3 % (ref 0–0.5)
KETONES UR QL STRIP: NEGATIVE
LEUKOCYTE ESTERASE UR QL STRIP: ABNORMAL
LIPASE SERPL-CCNC: 30 U/L (ref 4–60)
LYMPHOCYTES # BLD AUTO: 1.5 K/UL (ref 1–4.8)
LYMPHOCYTES NFR BLD: 12.5 % (ref 18–48)
MCH RBC QN AUTO: 20.3 PG (ref 27–31)
MCHC RBC AUTO-ENTMCNC: 28.5 G/DL (ref 32–36)
MCV RBC AUTO: 71 FL (ref 82–98)
MICROSCOPIC COMMENT: ABNORMAL
MONOCYTES # BLD AUTO: 0.6 K/UL (ref 0.3–1)
MONOCYTES NFR BLD: 5.1 % (ref 4–15)
NEUTROPHILS # BLD AUTO: 9.5 K/UL (ref 1.8–7.7)
NEUTROPHILS NFR BLD: 81.4 % (ref 38–73)
NITRITE UR QL STRIP: NEGATIVE
NRBC BLD-RTO: 0 /100 WBC
PH UR STRIP: 6 [PH] (ref 5–8)
PLATELET # BLD AUTO: 553 K/UL (ref 150–350)
PMV BLD AUTO: 10.3 FL (ref 9.2–12.9)
POTASSIUM SERPL-SCNC: 3.9 MMOL/L (ref 3.5–5.1)
PROT SERPL-MCNC: 8.7 G/DL (ref 6–8.4)
PROT UR QL STRIP: NEGATIVE
RBC # BLD AUTO: 5.48 M/UL (ref 4–5.4)
RBC #/AREA URNS AUTO: 1 /HPF (ref 0–4)
SARS-COV-2 RDRP RESP QL NAA+PROBE: NEGATIVE
SODIUM SERPL-SCNC: 137 MMOL/L (ref 136–145)
SP GR UR STRIP: >=1.03 (ref 1–1.03)
SQUAMOUS #/AREA URNS AUTO: 1 /HPF
URN SPEC COLLECT METH UR: ABNORMAL
UROBILINOGEN UR STRIP-ACNC: NEGATIVE EU/DL
WBC # BLD AUTO: 11.68 K/UL (ref 3.9–12.7)
WBC #/AREA URNS AUTO: 24 /HPF (ref 0–5)
WBC CLUMPS UR QL AUTO: ABNORMAL

## 2020-07-13 PROCEDURE — 81000 URINALYSIS NONAUTO W/SCOPE: CPT | Mod: ER

## 2020-07-13 PROCEDURE — 81025 URINE PREGNANCY TEST: CPT | Mod: ER

## 2020-07-13 PROCEDURE — U0002 COVID-19 LAB TEST NON-CDC: HCPCS | Mod: ER

## 2020-07-13 PROCEDURE — 96374 THER/PROPH/DIAG INJ IV PUSH: CPT | Mod: ER

## 2020-07-13 PROCEDURE — 87077 CULTURE AEROBIC IDENTIFY: CPT

## 2020-07-13 PROCEDURE — 99284 EMERGENCY DEPT VISIT MOD MDM: CPT | Mod: 25,ER

## 2020-07-13 PROCEDURE — 80053 COMPREHEN METABOLIC PANEL: CPT | Mod: ER

## 2020-07-13 PROCEDURE — 87186 SC STD MICRODIL/AGAR DIL: CPT

## 2020-07-13 PROCEDURE — 25000003 PHARM REV CODE 250: Mod: ER | Performed by: PHYSICIAN ASSISTANT

## 2020-07-13 PROCEDURE — 87088 URINE BACTERIA CULTURE: CPT

## 2020-07-13 PROCEDURE — 87086 URINE CULTURE/COLONY COUNT: CPT

## 2020-07-13 PROCEDURE — 96361 HYDRATE IV INFUSION ADD-ON: CPT | Mod: ER

## 2020-07-13 PROCEDURE — 85025 COMPLETE CBC W/AUTO DIFF WBC: CPT | Mod: ER

## 2020-07-13 PROCEDURE — 63600175 PHARM REV CODE 636 W HCPCS: Mod: ER | Performed by: PHYSICIAN ASSISTANT

## 2020-07-13 PROCEDURE — 83690 ASSAY OF LIPASE: CPT | Mod: ER

## 2020-07-13 RX ORDER — ONDANSETRON 2 MG/ML
8 INJECTION INTRAMUSCULAR; INTRAVENOUS
Status: COMPLETED | OUTPATIENT
Start: 2020-07-13 | End: 2020-07-13

## 2020-07-13 RX ORDER — HYOSCYAMINE SULFATE 0.12 MG/1
0.12 TABLET SUBLINGUAL EVERY 4 HOURS PRN
Qty: 12 TABLET | Refills: 0 | Status: SHIPPED | OUTPATIENT
Start: 2020-07-13

## 2020-07-13 RX ORDER — CIPROFLOXACIN AND DEXAMETHASONE 3; 1 MG/ML; MG/ML
4 SUSPENSION/ DROPS AURICULAR (OTIC) 2 TIMES DAILY
Qty: 5 ML | Refills: 1 | Status: SHIPPED | OUTPATIENT
Start: 2020-07-13 | End: 2021-01-12 | Stop reason: SDUPTHER

## 2020-07-13 RX ORDER — ONDANSETRON 4 MG/1
4 TABLET, FILM COATED ORAL EVERY 8 HOURS PRN
Qty: 12 TABLET | Refills: 0 | Status: SHIPPED | OUTPATIENT
Start: 2020-07-13

## 2020-07-13 RX ADMIN — ONDANSETRON 8 MG: 2 INJECTION INTRAMUSCULAR; INTRAVENOUS at 08:07

## 2020-07-13 RX ADMIN — SODIUM CHLORIDE 1000 ML: 0.9 INJECTION, SOLUTION INTRAVENOUS at 08:07

## 2020-07-14 NOTE — ED PROVIDER NOTES
"   History      Chief Complaint   Patient presents with    Abdominal Pain     c/o lower abd pain with diarrhea and vomiting       Review of patient's allergies indicates:   Allergen Reactions    Iodine and iodide containing products     Shellfish containing products Rash    Neomycin     Penicillins         HPI   Chief complaint: Abdominal pain with vomiting and diarrhea          7/13/2020, 8:03 PM   History obtained from the patient      History of Present Illness: Denise Barron is a 35 y.o. female patient who presents to the Emergency Department for periumbilical pain with diarrhea and vomiting for 1 day.  Denies fever, cough, dysuria. Symptoms are moderate in severity.     No further complaints or concerns at this time.           PCP: Kenji Evans MD       Past Medical History:  Past Medical History:   Diagnosis Date    Anticoagulant long-term use     Eczema     H/O cold sores     Hypertension     Obesity     Psoriasis     Seizures     Stroke          Past Surgical History:  Past Surgical History:   Procedure Laterality Date    APPENDECTOMY      TOE AMPUTATION      Right great toe amputated due to infection from "hangnail"     TOE AMPUTATION      right great           Family History:  Family History   Problem Relation Age of Onset    Cancer Neg Hx            Social History:  Social History     Tobacco Use    Smoking status: Never Smoker    Smokeless tobacco: Never Used   Substance and Sexual Activity    Alcohol use: No    Drug use: No    Sexual activity: Yes     Partners: Male       ROS     Review of Systems   Constitutional: Negative for chills and fever.   HENT: Negative for facial swelling and trouble swallowing.    Eyes: Negative for discharge, redness and visual disturbance.   Respiratory: Negative for chest tightness and shortness of breath.    Cardiovascular: Negative for chest pain and leg swelling.   Gastrointestinal: Positive for abdominal pain, diarrhea and vomiting. " "  Genitourinary: Negative for decreased urine volume and dysuria.   Musculoskeletal: Negative for joint swelling and neck stiffness.   Skin: Negative for rash and wound.   Neurological: Negative for syncope and facial asymmetry.   All other systems reviewed and are negative.      Physical Exam      Initial Vitals [07/13/20 1955]   BP Pulse Resp Temp SpO2   (!) 169/100 102 20 98.4 °F (36.9 °C) 99 %      MAP       --         Physical Exam  Vital signs and nursing notes reviewed.  Constitutional: Patient is in NAD.  Obese. Awake and alert. Well-developed and well-nourished.  Head: Atraumatic. Normocephalic.  Eyes: PERRL. EOM intact. Conjunctivae nl. No scleral icterus.  ENT: Mucous membranes are moist. Oropharynx is clear.  Neck: Supple. No JVD. No lymphadenopathy.  No meningismus  Cardiovascular: Regular rate and rhythm. No murmurs, rubs, or gallops. Distal pulses are 2+ and symmetric.  Pulmonary/Chest: No respiratory distress. Clear to auscultation bilaterally. No wheezing, rales, or rhonchi.  Abdominal: Soft. Non-distended. Periumbilical ttp with mild guarding. No palpable masses. No rebound or rigidity. Good bowel sounds.  Genitourinary: No CVA tenderness  Musculoskeletal: Moves all extremities. No edema.   Skin: Warm and dry.  Neurological: Awake and alert. No acute focal neurological deficits are appreciated.  Psychiatric: Normal affect. Good eye contact. Appropriate in content.      ED Course          Procedures  ED Vital Signs:  Vitals:    07/13/20 1955   BP: (!) 169/100   Pulse: 102   Resp: 20   Temp: 98.4 °F (36.9 °C)   TempSrc: Oral   SpO2: 99%   Weight: (!) 137.4 kg (302 lb 14.6 oz)   Height: 5' 6" (1.676 m)             Imaging Results:  Imaging Results    None            The Emergency Provider reviewed the vital signs and test results, which are outlined above.    ED Discussion             Medication(s) given in the ER:  Medications   sodium chloride 0.9% bolus 1,000 mL (has no administration in time " range)                      Medication List      ASK your doctor about these medications    betamethasone dipropionate 0.05 % ointment  Commonly known as: DIPROLENE     ciprofloxacin-dexamethasone 0.3-0.1% 0.3-0.1 % Drps  Commonly known as: CIPRODEX  Place 4 drops into both ears 2 (two) times daily.     * clobetasoL 0.05 % cream  Commonly known as: TEMOVATE     * clobetasoL 0.05 % shampoo  Commonly known as: CLOBEX     diclofenac 50 MG EC tablet  Commonly known as: VOLTAREN  Take 1 tablet (50 mg total) by mouth 2 (two) times daily.     ELIQUIS 2.5 mg Tab  Generic drug: apixaban     erythromycin ophthalmic ointment  Commonly known as: ROMYCIN  Place a 1/2 inch ribbon of ointment into the lower eyelid of both eyes 4 times daily for 1 week     fluconazole 150 MG Tab  Commonly known as: DIFLUCAN  TAKE ONE TABLET BY MOUTH EVERY 3 DAYS     fluocinolone and shower cap 0.01 % Oil     fluticasone propionate 50 mcg/actuation nasal spray  Commonly known as: FLONASE  2 sprays (100 mcg total) by Each Nostril route once daily.     folic acid 1 MG tablet  Commonly known as: FOLVITE     hydroCHLOROthiazide 12.5 MG Tab  Commonly known as: HYDRODIURIL     hydrocortisone 2.5 % ointment     hydrOXYzine HCL 25 MG tablet  Commonly known as: ATARAX     ketoconazole 2 % cream  Commonly known as: NIZORAL     lamoTRIgine 250 mg Tr24  TAKE ONE TABLET BY MOUTH EVERY DAY     losartan 100 MG tablet  Commonly known as: COZAAR  Take 1 tablet (100 mg total) by mouth once daily.     ofloxacin 0.3 % ophthalmic solution  Commonly known as: OCUFLOX  Place 1 drop into both eyes 4 (four) times daily.     predniSONE 20 MG tablet  Commonly known as: DELTASONE     sulfamethoxazole-trimethoprim 400-80mg 400-80 mg per tablet  Commonly known as: BACTRIM,SEPTRA  Take 1 tablet by mouth 2 (two) times daily.     triamcinolone acetonide 0.1% 0.1 % cream  Commonly known as: KENALOG         * This list has 2 medication(s) that are the same as other medications  prescribed for you. Read the directions carefully, and ask your doctor or other care provider to review them with you.                    Medical Decision Making        All findings were reviewed with the patient/family in detail.   All remaining questions and concerns were addressed at that time.  Patient/family has been counseled regarding the need for follow-up as well as the indication to return to the emergency room should new or worrisome developments occur.        MDM            Attending Attestation:     Physician Attestation Statement for NP/PA:   I have conducted a face to face encounter with this patient in addition to the NP/PA, due to    Other NP/PA Attestation Additions:    History of Present Illness: 34 y/o female with abdominal pain and NVD for 1 day. Severity is moderate. Pt also c/o to me of bilateral ear drainage   Physical Exam: Obese In no acute distress.  Abdominal exam with no signs of acute abdomen with some guarding.  Ears reveal bilateral serous drainage from both external canals with distorted TM anatomy to left ear. No tenderness or pain on manipulation of the pinnae   Medical Decision Making: CT scan of abdomen reveals enteritis with no obstruction. No ureteral lithiasis; no appendicitis or signs of diverticulitis.  Pt comfortable with symptomatic treatment and dietary discretion.  Ears treated with Ciprodex and advised to see PCP for followup of the apparent chronic intermittent ear problem   Procedure Note: None              Clinical Impression:              ICD-10-CM ICD-9-CM   1. Gastroenteritis  K52.9 558.9   2. Otitis externa of both ears, unspecified chronicity, unspecified type  H60.93 380.10          Fernando Zuniga MD  07/14/20 0512       Fernando Zuniga MD  07/27/20 1559       Fernando Zuniga MD  07/27/20 1639

## 2020-07-14 NOTE — ED NOTES
Pt states no further needs/concerns. resp even, unlabored. No distress noted. Pt AAOx3. Pt denies nausea. Will d/c per MD order.

## 2020-07-14 NOTE — ED NOTES
Pt resting quietly in bed, resp even, unlabored. No distress noted. Pt AAOx3. Updated pt on POC. Pt verbalized understanding. Currently awaiting CT result. Will continue to monitor.

## 2020-07-16 LAB — BACTERIA UR CULT: ABNORMAL

## 2020-07-23 ENCOUNTER — HOSPITAL ENCOUNTER (EMERGENCY)
Facility: HOSPITAL | Age: 35
Discharge: HOME | End: 2020-07-23
Attending: EMERGENCY MEDICINE
Payer: COMMERCIAL

## 2020-07-23 VITALS
RESPIRATION RATE: 18 BRPM | TEMPERATURE: 98 F | OXYGEN SATURATION: 100 % | SYSTOLIC BLOOD PRESSURE: 185 MMHG | WEIGHT: 293 LBS | BODY MASS INDEX: 49.18 KG/M2 | DIASTOLIC BLOOD PRESSURE: 77 MMHG | HEART RATE: 93 BPM

## 2020-07-23 DIAGNOSIS — L40.9 PSORIASIS: Primary | ICD-10-CM

## 2020-07-23 PROCEDURE — 99284 EMERGENCY DEPT VISIT MOD MDM: CPT | Mod: ER

## 2020-07-23 RX ORDER — FLUCONAZOLE 150 MG/1
TABLET ORAL
Qty: 2 TABLET | Refills: 2 | Status: SHIPPED | OUTPATIENT
Start: 2020-07-23 | End: 2021-01-12 | Stop reason: SDUPTHER

## 2020-07-23 RX ORDER — CLOBETASOL PROPIONATE 0.5 MG/G
CREAM TOPICAL 2 TIMES DAILY
Qty: 45 G | Refills: 1 | Status: SHIPPED | OUTPATIENT
Start: 2020-07-23 | End: 2020-09-20 | Stop reason: SDUPTHER

## 2020-07-23 RX ORDER — SULFAMETHOXAZOLE AND TRIMETHOPRIM 400; 80 MG/1; MG/1
1 TABLET ORAL 2 TIMES DAILY
Qty: 20 TABLET | Refills: 0 | Status: SHIPPED | OUTPATIENT
Start: 2020-07-23 | End: 2020-09-20 | Stop reason: SDUPTHER

## 2020-07-23 RX ORDER — HYDROCORTISONE 25 MG/G
OINTMENT TOPICAL 2 TIMES DAILY
Qty: 20 G | Refills: 1 | Status: SHIPPED | OUTPATIENT
Start: 2020-07-23

## 2020-07-23 NOTE — ED PROVIDER NOTES
"Encounter Date: 7/23/2020       History     Chief Complaint   Patient presents with    Rash     Psorasis     The history is provided by the patient.   Rash   This is a chronic problem. The current episode started several days ago. The problem has been unchanged. Associated with: history of psoriasis. The rash is present on the torso, trunk, left arm, left upper leg, left lower leg, right arm, right upper leg and right lower leg. The pain has been constant since onset. Associated symptoms include itching. The treatment provided mild relief. Risk factors: Ran out of meds.     Review of patient's allergies indicates:   Allergen Reactions    Iodine and iodide containing products     Shellfish containing products Rash    Neomycin     Penicillins      Past Medical History:   Diagnosis Date    Anticoagulant long-term use     Eczema     H/O cold sores     Hypertension     Obesity     Psoriasis     Seizures     Stroke      Past Surgical History:   Procedure Laterality Date    APPENDECTOMY      TOE AMPUTATION      Right great toe amputated due to infection from "hangnail"     TOE AMPUTATION      right great     Family History   Problem Relation Age of Onset    Cancer Neg Hx      Social History     Tobacco Use    Smoking status: Never Smoker    Smokeless tobacco: Never Used   Substance Use Topics    Alcohol use: No    Drug use: No     Review of Systems   Constitutional: Negative for fever.   HENT: Negative for sore throat.    Respiratory: Negative for shortness of breath.    Cardiovascular: Negative for chest pain.   Gastrointestinal: Negative for nausea.   Genitourinary: Negative for dysuria.   Musculoskeletal: Negative for back pain.   Skin: Positive for itching and rash.   Neurological: Negative for weakness.   Hematological: Does not bruise/bleed easily.       Physical Exam     Initial Vitals [07/23/20 0739]   BP Pulse Resp Temp SpO2   (!) 185/77 93 18 98.2 °F (36.8 °C) 100 %      MAP       --     "     Physical Exam    Nursing note and vitals reviewed.  Constitutional: She appears well-developed and well-nourished. No distress.   HENT:   Head: Normocephalic and atraumatic.   Eyes: Conjunctivae and EOM are normal. Pupils are equal, round, and reactive to light.   Neck: Normal range of motion.   Cardiovascular: Normal rate and intact distal pulses.   Pulmonary/Chest: No respiratory distress.   Abdominal: She exhibits no distension.   Musculoskeletal: Normal range of motion. No tenderness or edema.   Neurological: She is alert and oriented to person, place, and time.   Skin: Skin is warm and dry. Rash (Diffuse plaques to bilateral upper and lower extremities. ) noted.   Psychiatric: She has a normal mood and affect. Thought content normal.         ED Course   Procedures  Labs Reviewed - No data to display       Imaging Results    None                                          Clinical Impression:       ICD-10-CM ICD-9-CM   1. Psoriasis  L40.9 696.1           Disposition:   Disposition: Discharged  Condition: Stable     ED Disposition Condition    Discharge Stable        ED Prescriptions     Medication Sig Dispense Start Date End Date Auth. Provider    fluconazole (DIFLUCAN) 150 MG Tab TAKE ONE TABLET BY MOUTH EVERY 3 DAYS 2 tablet 7/23/2020  Jaret House MD    hydrocortisone 2.5 % ointment Apply topically 2 (two) times daily. 20 g 7/23/2020  Jaret House MD    sulfamethoxazole-trimethoprim 400-80mg (BACTRIM,SEPTRA) 400-80 mg per tablet Take 1 tablet by mouth 2 (two) times daily. 20 tablet 7/23/2020  Jaret House MD    clobetasoL (TEMOVATE) 0.05 % cream Apply topically 2 (two) times daily. 45 g 7/23/2020  Jaret House MD        Follow-up Information     Follow up With Specialties Details Why Contact Info    Kenji Evans MD Family Medicine   [id number] [facility]  [place] [address] [phone]                                       Jaret House MD  07/23/20  1059

## 2020-09-20 ENCOUNTER — HOSPITAL ENCOUNTER (EMERGENCY)
Facility: HOSPITAL | Age: 35
Discharge: HOME | End: 2020-09-20
Attending: EMERGENCY MEDICINE
Payer: COMMERCIAL

## 2020-09-20 VITALS
WEIGHT: 293 LBS | HEART RATE: 100 BPM | BODY MASS INDEX: 47.09 KG/M2 | HEIGHT: 66 IN | OXYGEN SATURATION: 99 % | DIASTOLIC BLOOD PRESSURE: 70 MMHG | RESPIRATION RATE: 20 BRPM | SYSTOLIC BLOOD PRESSURE: 144 MMHG

## 2020-09-20 DIAGNOSIS — L40.9 PSORIASIS: Primary | ICD-10-CM

## 2020-09-20 PROCEDURE — 96372 THER/PROPH/DIAG INJ SC/IM: CPT | Mod: ER

## 2020-09-20 PROCEDURE — 25000003 PHARM REV CODE 250: Mod: ER | Performed by: EMERGENCY MEDICINE

## 2020-09-20 PROCEDURE — 63600175 PHARM REV CODE 636 W HCPCS: Mod: ER | Performed by: EMERGENCY MEDICINE

## 2020-09-20 PROCEDURE — 99284 EMERGENCY DEPT VISIT MOD MDM: CPT | Mod: 25,ER

## 2020-09-20 RX ORDER — CLOBETASOL PROPIONATE 0.5 MG/G
CREAM TOPICAL 2 TIMES DAILY
Qty: 45 G | Refills: 0 | Status: SHIPPED | OUTPATIENT
Start: 2020-09-20 | End: 2020-09-27

## 2020-09-20 RX ORDER — SULFAMETHOXAZOLE AND TRIMETHOPRIM 400; 80 MG/1; MG/1
1 TABLET ORAL 2 TIMES DAILY
Qty: 14 TABLET | Refills: 0 | Status: SHIPPED | OUTPATIENT
Start: 2020-09-20 | End: 2020-09-27

## 2020-09-20 RX ORDER — PREDNISONE 20 MG/1
40 TABLET ORAL DAILY
Qty: 10 TABLET | Refills: 0 | Status: SHIPPED | OUTPATIENT
Start: 2020-09-20 | End: 2020-09-25

## 2020-09-20 RX ORDER — CLINDAMYCIN HYDROCHLORIDE 300 MG/1
CAPSULE ORAL
COMMUNITY
Start: 2020-06-25 | End: 2021-01-12 | Stop reason: SDUPTHER

## 2020-09-20 RX ORDER — ONDANSETRON 4 MG/1
4 TABLET, ORALLY DISINTEGRATING ORAL
Status: COMPLETED | OUTPATIENT
Start: 2020-09-20 | End: 2020-09-20

## 2020-09-20 RX ORDER — MORPHINE SULFATE 4 MG/ML
4 INJECTION, SOLUTION INTRAMUSCULAR; INTRAVENOUS
Status: COMPLETED | OUTPATIENT
Start: 2020-09-20 | End: 2020-09-20

## 2020-09-20 RX ADMIN — ONDANSETRON 4 MG: 4 TABLET, ORALLY DISINTEGRATING ORAL at 08:09

## 2020-09-20 RX ADMIN — MORPHINE SULFATE 4 MG: 4 INJECTION, SOLUTION INTRAMUSCULAR; INTRAVENOUS at 08:09

## 2020-09-20 NOTE — ED NOTES
Aaox3, skin warm and dry, resp unlabored and even. amb with steady gait and grover well. C/o increase in psoriasis with pain. Areas of dried scaly area and some red and weepy.

## 2020-09-20 NOTE — ED PROVIDER NOTES
"Encounter Date: 9/20/2020       History     Chief Complaint   Patient presents with    Rash     painful and itching psoriasis     The history is provided by the patient.   Rash   This is a chronic problem. The current episode started several weeks ago. The problem has been unchanged. The problem is associated with nothing (Chronic psoriatic condition). The rash is present on the scalp, head, trunk, right arm, left arm, right lower leg and left lower leg. The pain is at a severity of 5/10. The pain has been constant since onset. Associated symptoms include itching and pain. Pertinent negatives include no blisters and no weeping. She has tried anti-itch cream, steriods, antihistamines and OTC analgesics for the symptoms.     Review of patient's allergies indicates:   Allergen Reactions    Iodine and iodide containing products     Shellfish containing products Rash    Neomycin     Penicillins      Past Medical History:   Diagnosis Date    Anticoagulant long-term use     Eczema     H/O cold sores     Hypertension     Obesity     Psoriasis     Seizures     Stroke      Past Surgical History:   Procedure Laterality Date    APPENDECTOMY      TOE AMPUTATION      Right great toe amputated due to infection from "hangnail"     TOE AMPUTATION      right great     Family History   Problem Relation Age of Onset    Cancer Neg Hx      Social History     Tobacco Use    Smoking status: Never Smoker    Smokeless tobacco: Never Used   Substance Use Topics    Alcohol use: No    Drug use: No     Review of Systems   Constitutional: Negative for fever.   Respiratory: Negative for shortness of breath.    Cardiovascular: Negative for chest pain.   Gastrointestinal: Negative for abdominal pain.   Skin: Positive for itching and rash.   All other systems reviewed and are negative.      Physical Exam     Initial Vitals [09/20/20 0807]   BP Pulse Resp Temp SpO2   (!) 152/92 99 16 -- 98 %      MAP       --         Physical " Exam    Nursing note and vitals reviewed.  Constitutional: She appears well-developed and well-nourished. No distress.   HENT:   Head: Normocephalic and atraumatic.   Mouth/Throat: Oropharynx is clear and moist.   Eyes: Conjunctivae and EOM are normal. Pupils are equal, round, and reactive to light.   Neck: Normal range of motion. Neck supple.   Cardiovascular: Normal rate, regular rhythm and normal heart sounds.   Pulmonary/Chest: Breath sounds normal. No respiratory distress.   Abdominal: Soft. Bowel sounds are normal. She exhibits no distension. There is no abdominal tenderness.   Musculoskeletal: Normal range of motion.   Neurological: She is alert and oriented to person, place, and time. She has normal strength.   Skin: Skin is warm and dry. Rash noted.   Diffuse plaques bilateral extremities, head/scalp.No drainage or fluctuance./induration   Psychiatric: She has a normal mood and affect. Thought content normal.         ED Course   Procedures  Labs Reviewed - No data to display       Imaging Results    None     8:32 AM - Counseling: Spoke with the patient and discussed todays findings, in addition to providing specific details for the plan of care and counseling regarding the diagnosis and prognosis. Questions are answered at this time.                                   Clinical Impression:       ICD-10-CM ICD-9-CM   1. Psoriasis  L40.9 696.1                          ED Disposition Condition    Discharge Stable        ED Prescriptions     Medication Sig Dispense Start Date End Date Auth. Provider    clobetasoL (TEMOVATE) 0.05 % cream Apply topically 2 (two) times daily. for 7 days 45 g 9/20/2020 9/27/2020 Harry Ochoa MD    sulfamethoxazole-trimethoprim 400-80mg (BACTRIM,SEPTRA) 400-80 mg per tablet Take 1 tablet by mouth 2 (two) times daily. for 7 days 14 tablet 9/20/2020 9/27/2020 Harry Ochoa MD    predniSONE (DELTASONE) 20 MG tablet Take 2 tablets (40 mg total) by mouth once daily.  for 5 days 10 tablet 9/20/2020 9/25/2020 Harry Ochoa MD        Follow-up Information     Follow up With Specialties Details Why Contact Info    Kenji Evans MD Family Medicine Call in 2 days  [id number] [facility]  [place] [address]  [phone]      [facility] Emergency Medicine  If symptoms worsen [address]  [place] [id number] [phone]                                       Harry Ochoa MD  09/20/20 0839

## 2021-03-29 ENCOUNTER — HOSPITAL ENCOUNTER (EMERGENCY)
Facility: HOSPITAL | Age: 36
Discharge: HOME | End: 2021-03-29
Attending: EMERGENCY MEDICINE
Payer: COMMERCIAL

## 2021-03-29 VITALS
DIASTOLIC BLOOD PRESSURE: 89 MMHG | HEART RATE: 86 BPM | TEMPERATURE: 98 F | OXYGEN SATURATION: 99 % | WEIGHT: 293 LBS | HEIGHT: 66 IN | RESPIRATION RATE: 18 BRPM | BODY MASS INDEX: 47.09 KG/M2 | SYSTOLIC BLOOD PRESSURE: 133 MMHG

## 2021-03-29 DIAGNOSIS — H60.502 ACUTE OTITIS EXTERNA OF LEFT EAR, UNSPECIFIED TYPE: Primary | ICD-10-CM

## 2021-03-29 DIAGNOSIS — T16.2XXA FOREIGN BODY OF LEFT EAR, INITIAL ENCOUNTER: ICD-10-CM

## 2021-03-29 PROCEDURE — 69200 CLEAR OUTER EAR CANAL: CPT

## 2021-03-29 PROCEDURE — 99283 EMERGENCY DEPT VISIT LOW MDM: CPT | Mod: ER

## 2021-03-29 RX ORDER — OFLOXACIN 3 MG/ML
3 SOLUTION AURICULAR (OTIC) 2 TIMES DAILY
Qty: 42 DROP | Refills: 0 | Status: SHIPPED | OUTPATIENT
Start: 2021-03-29 | End: 2021-04-05

## 2021-04-19 ENCOUNTER — HOSPITAL ENCOUNTER (EMERGENCY)
Facility: HOSPITAL | Age: 36
Discharge: HOME | End: 2021-04-19
Attending: EMERGENCY MEDICINE
Payer: COMMERCIAL

## 2021-04-19 VITALS
WEIGHT: 293 LBS | RESPIRATION RATE: 20 BRPM | HEART RATE: 80 BPM | TEMPERATURE: 98 F | DIASTOLIC BLOOD PRESSURE: 76 MMHG | SYSTOLIC BLOOD PRESSURE: 144 MMHG | OXYGEN SATURATION: 100 % | BODY MASS INDEX: 49.25 KG/M2

## 2021-04-19 DIAGNOSIS — Z91.148 NONCOMPLIANCE WITH MEDICATION REGIMEN: ICD-10-CM

## 2021-04-19 DIAGNOSIS — N30.01 ACUTE CYSTITIS WITH HEMATURIA: Primary | ICD-10-CM

## 2021-04-19 DIAGNOSIS — R56.9 SEIZURE: ICD-10-CM

## 2021-04-19 LAB
ALBUMIN SERPL BCP-MCNC: 3.2 G/DL (ref 3.5–5.2)
ALP SERPL-CCNC: 83 U/L (ref 55–135)
ALT SERPL W/O P-5'-P-CCNC: 15 U/L (ref 10–44)
ANION GAP SERPL CALC-SCNC: 12 MMOL/L (ref 8–16)
AST SERPL-CCNC: 13 U/L (ref 10–40)
B-HCG UR QL: NEGATIVE
BACTERIA #/AREA URNS AUTO: ABNORMAL /HPF
BASOPHILS # BLD AUTO: 0.01 K/UL (ref 0–0.2)
BASOPHILS NFR BLD: 0.1 % (ref 0–1.9)
BILIRUB SERPL-MCNC: 0.2 MG/DL (ref 0.1–1)
BILIRUB UR QL STRIP: NEGATIVE
BUN SERPL-MCNC: 13 MG/DL (ref 6–20)
CALCIUM SERPL-MCNC: 8.8 MG/DL (ref 8.7–10.5)
CHLORIDE SERPL-SCNC: 107 MMOL/L (ref 95–110)
CLARITY UR REFRACT.AUTO: ABNORMAL
CO2 SERPL-SCNC: 24 MMOL/L (ref 23–29)
COLOR UR AUTO: YELLOW
CREAT SERPL-MCNC: 0.8 MG/DL (ref 0.5–1.4)
DIFFERENTIAL METHOD: ABNORMAL
EOSINOPHIL # BLD AUTO: 0.2 K/UL (ref 0–0.5)
EOSINOPHIL NFR BLD: 2.7 % (ref 0–8)
ERYTHROCYTE [DISTWIDTH] IN BLOOD BY AUTOMATED COUNT: 18 % (ref 11.5–14.5)
EST. GFR  (AFRICAN AMERICAN): >60 ML/MIN/1.73 M^2
EST. GFR  (NON AFRICAN AMERICAN): >60 ML/MIN/1.73 M^2
GLUCOSE SERPL-MCNC: 85 MG/DL (ref 70–110)
GLUCOSE UR QL STRIP: NEGATIVE
HCT VFR BLD AUTO: 37 % (ref 37–48.5)
HCV AB SERPL QL IA: NEGATIVE
HGB BLD-MCNC: 10.3 G/DL (ref 12–16)
HGB UR QL STRIP: NEGATIVE
HIV 1+2 AB+HIV1 P24 AG SERPL QL IA: NEGATIVE
IMM GRANULOCYTES # BLD AUTO: 0.05 K/UL (ref 0–0.04)
IMM GRANULOCYTES NFR BLD AUTO: 0.7 % (ref 0–0.5)
KETONES UR QL STRIP: NEGATIVE
LEUKOCYTE ESTERASE UR QL STRIP: ABNORMAL
LYMPHOCYTES # BLD AUTO: 1.2 K/UL (ref 1–4.8)
LYMPHOCYTES NFR BLD: 18.4 % (ref 18–48)
MCH RBC QN AUTO: 20.1 PG (ref 27–31)
MCHC RBC AUTO-ENTMCNC: 27.8 G/DL (ref 32–36)
MCV RBC AUTO: 72 FL (ref 82–98)
MICROSCOPIC COMMENT: ABNORMAL
MONOCYTES # BLD AUTO: 0.7 K/UL (ref 0.3–1)
MONOCYTES NFR BLD: 10.4 % (ref 4–15)
NEUTROPHILS # BLD AUTO: 4.6 K/UL (ref 1.8–7.7)
NEUTROPHILS NFR BLD: 67.7 % (ref 38–73)
NITRITE UR QL STRIP: NEGATIVE
NRBC BLD-RTO: 0 /100 WBC
PH UR STRIP: 6 [PH] (ref 5–8)
PLATELET # BLD AUTO: 508 K/UL (ref 150–450)
PMV BLD AUTO: 9.7 FL (ref 9.2–12.9)
POTASSIUM SERPL-SCNC: 3.8 MMOL/L (ref 3.5–5.1)
PROT SERPL-MCNC: 7.9 G/DL (ref 6–8.4)
PROT UR QL STRIP: NEGATIVE
RBC # BLD AUTO: 5.12 M/UL (ref 4–5.4)
RBC #/AREA URNS AUTO: 3 /HPF (ref 0–4)
SODIUM SERPL-SCNC: 143 MMOL/L (ref 136–145)
SP GR UR STRIP: >=1.03 (ref 1–1.03)
SQUAMOUS #/AREA URNS AUTO: 10 /HPF
URN SPEC COLLECT METH UR: ABNORMAL
UROBILINOGEN UR STRIP-ACNC: NEGATIVE EU/DL
WBC # BLD AUTO: 6.73 K/UL (ref 3.9–12.7)
WBC #/AREA URNS AUTO: 12 /HPF (ref 0–5)

## 2021-04-19 PROCEDURE — 99285 EMERGENCY DEPT VISIT HI MDM: CPT | Mod: 25,ER

## 2021-04-19 PROCEDURE — 36000 PLACE NEEDLE IN VEIN: CPT | Mod: ER

## 2021-04-19 PROCEDURE — 86703 HIV-1/HIV-2 1 RESULT ANTBDY: CPT | Performed by: EMERGENCY MEDICINE

## 2021-04-19 PROCEDURE — 93010 EKG 12-LEAD: ICD-10-PCS | Mod: ,,, | Performed by: INTERNAL MEDICINE

## 2021-04-19 PROCEDURE — 85025 COMPLETE CBC W/AUTO DIFF WBC: CPT | Mod: ER | Performed by: EMERGENCY MEDICINE

## 2021-04-19 PROCEDURE — 81025 URINE PREGNANCY TEST: CPT | Mod: ER | Performed by: EMERGENCY MEDICINE

## 2021-04-19 PROCEDURE — 93010 ELECTROCARDIOGRAM REPORT: CPT | Mod: ,,, | Performed by: INTERNAL MEDICINE

## 2021-04-19 PROCEDURE — 87086 URINE CULTURE/COLONY COUNT: CPT | Performed by: EMERGENCY MEDICINE

## 2021-04-19 PROCEDURE — 81000 URINALYSIS NONAUTO W/SCOPE: CPT | Mod: ER | Performed by: EMERGENCY MEDICINE

## 2021-04-19 PROCEDURE — 93005 ELECTROCARDIOGRAM TRACING: CPT | Mod: ER

## 2021-04-19 PROCEDURE — 86803 HEPATITIS C AB TEST: CPT | Performed by: EMERGENCY MEDICINE

## 2021-04-19 PROCEDURE — 80053 COMPREHEN METABOLIC PANEL: CPT | Mod: ER | Performed by: EMERGENCY MEDICINE

## 2021-04-19 RX ORDER — NITROFURANTOIN 25; 75 MG/1; MG/1
100 CAPSULE ORAL 2 TIMES DAILY
Qty: 14 CAPSULE | Refills: 0 | Status: SHIPPED | OUTPATIENT
Start: 2021-04-19 | End: 2021-04-26

## 2021-04-20 LAB
BACTERIA UR CULT: NORMAL
BACTERIA UR CULT: NORMAL

## 2021-04-28 ENCOUNTER — HOSPITAL ENCOUNTER (EMERGENCY)
Facility: HOSPITAL | Age: 36
Discharge: HOME | End: 2021-04-28
Attending: EMERGENCY MEDICINE
Payer: COMMERCIAL

## 2021-04-28 VITALS
SYSTOLIC BLOOD PRESSURE: 194 MMHG | WEIGHT: 293 LBS | HEART RATE: 111 BPM | DIASTOLIC BLOOD PRESSURE: 92 MMHG | BODY MASS INDEX: 47.09 KG/M2 | TEMPERATURE: 99 F | HEIGHT: 66 IN | RESPIRATION RATE: 18 BRPM | OXYGEN SATURATION: 98 %

## 2021-04-28 DIAGNOSIS — L30.9 ECZEMA, UNSPECIFIED TYPE: ICD-10-CM

## 2021-04-28 DIAGNOSIS — I10 HYPERTENSION, UNSPECIFIED TYPE: ICD-10-CM

## 2021-04-28 DIAGNOSIS — L03.115 CELLULITIS OF RIGHT LEG WITHOUT FOOT: Primary | ICD-10-CM

## 2021-04-28 PROCEDURE — 99284 EMERGENCY DEPT VISIT MOD MDM: CPT | Mod: ER

## 2021-04-28 RX ORDER — TRAMADOL HYDROCHLORIDE 50 MG/1
50 TABLET ORAL EVERY 6 HOURS PRN
Qty: 20 TABLET | Refills: 0 | Status: SHIPPED | OUTPATIENT
Start: 2021-04-28 | End: 2021-05-03

## 2021-04-28 RX ORDER — SULFAMETHOXAZOLE AND TRIMETHOPRIM 800; 160 MG/1; MG/1
1 TABLET ORAL 2 TIMES DAILY
Qty: 14 TABLET | Refills: 0 | Status: SHIPPED | OUTPATIENT
Start: 2021-04-28 | End: 2021-05-05

## 2021-04-29 ENCOUNTER — PATIENT MESSAGE (OUTPATIENT)
Dept: RESEARCH | Facility: HOSPITAL | Age: 36
End: 2021-04-29

## 2021-05-30 ENCOUNTER — HOSPITAL ENCOUNTER (EMERGENCY)
Facility: HOSPITAL | Age: 36
Discharge: HOME | End: 2021-05-30
Attending: EMERGENCY MEDICINE
Payer: COMMERCIAL

## 2021-05-30 VITALS
SYSTOLIC BLOOD PRESSURE: 142 MMHG | HEIGHT: 66 IN | DIASTOLIC BLOOD PRESSURE: 85 MMHG | OXYGEN SATURATION: 99 % | BODY MASS INDEX: 46.06 KG/M2 | RESPIRATION RATE: 16 BRPM | TEMPERATURE: 98 F | HEART RATE: 68 BPM | WEIGHT: 286.63 LBS

## 2021-05-30 DIAGNOSIS — B30.9 VIRAL CONJUNCTIVITIS OF BOTH EYES: ICD-10-CM

## 2021-05-30 DIAGNOSIS — L40.9 PSORIASIS: Primary | ICD-10-CM

## 2021-05-30 PROCEDURE — 99283 EMERGENCY DEPT VISIT LOW MDM: CPT | Mod: ER

## 2021-05-30 RX ORDER — BETAMETHASONE DIPROPIONATE 0.5 MG/G
OINTMENT TOPICAL 2 TIMES DAILY
Qty: 30 G | Refills: 0 | OUTPATIENT
Start: 2021-05-30 | End: 2021-06-03

## 2021-06-03 ENCOUNTER — HOSPITAL ENCOUNTER (EMERGENCY)
Facility: HOSPITAL | Age: 36
Discharge: HOME | End: 2021-06-03
Attending: STUDENT IN AN ORGANIZED HEALTH CARE EDUCATION/TRAINING PROGRAM
Payer: COMMERCIAL

## 2021-06-03 VITALS
TEMPERATURE: 99 F | SYSTOLIC BLOOD PRESSURE: 156 MMHG | WEIGHT: 288.81 LBS | OXYGEN SATURATION: 98 % | RESPIRATION RATE: 20 BRPM | HEART RATE: 115 BPM | HEIGHT: 66 IN | BODY MASS INDEX: 46.41 KG/M2 | DIASTOLIC BLOOD PRESSURE: 98 MMHG

## 2021-06-03 DIAGNOSIS — L40.9 PSORIASIS: ICD-10-CM

## 2021-06-03 DIAGNOSIS — S80.811A EXCORIATION OF RIGHT LOWER LEG, INITIAL ENCOUNTER: Primary | ICD-10-CM

## 2021-06-03 PROCEDURE — 25000003 PHARM REV CODE 250: Mod: ER | Performed by: STUDENT IN AN ORGANIZED HEALTH CARE EDUCATION/TRAINING PROGRAM

## 2021-06-03 PROCEDURE — 99283 EMERGENCY DEPT VISIT LOW MDM: CPT | Mod: ER

## 2021-06-03 RX ORDER — MUPIROCIN 20 MG/G
OINTMENT TOPICAL
Status: COMPLETED | OUTPATIENT
Start: 2021-06-03 | End: 2021-06-03

## 2021-06-03 RX ORDER — CALCIPOTRIENE, BETAMETHASONE DIPROPIONATE 50; .643 UG/G; MG/G
OINTMENT TOPICAL DAILY
Qty: 60 G | Refills: 1 | Status: SHIPPED | OUTPATIENT
Start: 2021-06-03

## 2021-06-03 RX ADMIN — MUPIROCIN: 20 OINTMENT TOPICAL at 03:06

## 2021-07-12 ENCOUNTER — HOSPITAL ENCOUNTER (EMERGENCY)
Facility: HOSPITAL | Age: 36
Discharge: HOME | End: 2021-07-12
Attending: EMERGENCY MEDICINE
Payer: COMMERCIAL

## 2021-07-12 VITALS
TEMPERATURE: 98 F | HEART RATE: 80 BPM | RESPIRATION RATE: 16 BRPM | OXYGEN SATURATION: 100 % | SYSTOLIC BLOOD PRESSURE: 115 MMHG | DIASTOLIC BLOOD PRESSURE: 70 MMHG

## 2021-07-12 DIAGNOSIS — S09.90XA HEAD TRAUMA: ICD-10-CM

## 2021-07-12 DIAGNOSIS — R56.9 SEIZURE: ICD-10-CM

## 2021-07-12 DIAGNOSIS — G40.909 RECURRENT SEIZURES: Primary | ICD-10-CM

## 2021-07-12 LAB
ALBUMIN SERPL BCP-MCNC: 3.1 G/DL (ref 3.5–5.2)
ALP SERPL-CCNC: 73 U/L (ref 55–135)
ALT SERPL W/O P-5'-P-CCNC: 18 U/L (ref 10–44)
ANION GAP SERPL CALC-SCNC: 9 MMOL/L (ref 8–16)
AST SERPL-CCNC: 12 U/L (ref 10–40)
B-HCG UR QL: NEGATIVE
BASOPHILS # BLD AUTO: 0.01 K/UL (ref 0–0.2)
BASOPHILS NFR BLD: 0.1 % (ref 0–1.9)
BILIRUB SERPL-MCNC: 0.3 MG/DL (ref 0.1–1)
BILIRUB UR QL STRIP: NEGATIVE
BNP SERPL-MCNC: 45 PG/ML (ref 0–99)
BUN SERPL-MCNC: 8 MG/DL (ref 6–20)
CALCIUM SERPL-MCNC: 8.3 MG/DL (ref 8.7–10.5)
CHLORIDE SERPL-SCNC: 108 MMOL/L (ref 95–110)
CLARITY UR REFRACT.AUTO: ABNORMAL
CO2 SERPL-SCNC: 27 MMOL/L (ref 23–29)
COLOR UR AUTO: ABNORMAL
CREAT SERPL-MCNC: 0.8 MG/DL (ref 0.5–1.4)
DIFFERENTIAL METHOD: ABNORMAL
EOSINOPHIL # BLD AUTO: 0 K/UL (ref 0–0.5)
EOSINOPHIL NFR BLD: 0.3 % (ref 0–8)
ERYTHROCYTE [DISTWIDTH] IN BLOOD BY AUTOMATED COUNT: 18.3 % (ref 11.5–14.5)
EST. GFR  (AFRICAN AMERICAN): >60 ML/MIN/1.73 M^2
EST. GFR  (NON AFRICAN AMERICAN): >60 ML/MIN/1.73 M^2
GLUCOSE SERPL-MCNC: 94 MG/DL (ref 70–110)
GLUCOSE UR QL STRIP: NEGATIVE
HCT VFR BLD AUTO: 34.1 % (ref 37–48.5)
HGB BLD-MCNC: 9.7 G/DL (ref 12–16)
HGB UR QL STRIP: ABNORMAL
IMM GRANULOCYTES # BLD AUTO: 0.04 K/UL (ref 0–0.04)
IMM GRANULOCYTES NFR BLD AUTO: 0.5 % (ref 0–0.5)
KETONES UR QL STRIP: NEGATIVE
LEUKOCYTE ESTERASE UR QL STRIP: ABNORMAL
LYMPHOCYTES # BLD AUTO: 1.1 K/UL (ref 1–4.8)
LYMPHOCYTES NFR BLD: 15.1 % (ref 18–48)
MCH RBC QN AUTO: 20.3 PG (ref 27–31)
MCHC RBC AUTO-ENTMCNC: 28.4 G/DL (ref 32–36)
MCV RBC AUTO: 71 FL (ref 82–98)
MICROSCOPIC COMMENT: ABNORMAL
MONOCYTES # BLD AUTO: 0.5 K/UL (ref 0.3–1)
MONOCYTES NFR BLD: 6.2 % (ref 4–15)
NEUTROPHILS # BLD AUTO: 5.9 K/UL (ref 1.8–7.7)
NEUTROPHILS NFR BLD: 77.8 % (ref 38–73)
NITRITE UR QL STRIP: NEGATIVE
NRBC BLD-RTO: 0 /100 WBC
PH UR STRIP: 6 [PH] (ref 5–8)
PLATELET # BLD AUTO: 501 K/UL (ref 150–450)
PMV BLD AUTO: 10.1 FL (ref 9.2–12.9)
POTASSIUM SERPL-SCNC: 3.3 MMOL/L (ref 3.5–5.1)
PROT SERPL-MCNC: 7.3 G/DL (ref 6–8.4)
PROT UR QL STRIP: NEGATIVE
RBC # BLD AUTO: 4.78 M/UL (ref 4–5.4)
RBC #/AREA URNS AUTO: >100 /HPF (ref 0–4)
SODIUM SERPL-SCNC: 144 MMOL/L (ref 136–145)
SP GR UR STRIP: 1.02 (ref 1–1.03)
SQUAMOUS #/AREA URNS AUTO: 5 /HPF
TROPONIN I SERPL DL<=0.01 NG/ML-MCNC: 0.01 NG/ML (ref 0–0.03)
URN SPEC COLLECT METH UR: ABNORMAL
UROBILINOGEN UR STRIP-ACNC: NEGATIVE EU/DL
WBC # BLD AUTO: 7.55 K/UL (ref 3.9–12.7)
WBC #/AREA URNS AUTO: 3 /HPF (ref 0–5)

## 2021-07-12 PROCEDURE — 99285 EMERGENCY DEPT VISIT HI MDM: CPT | Mod: 25,ER

## 2021-07-12 PROCEDURE — 81025 URINE PREGNANCY TEST: CPT | Mod: ER | Performed by: EMERGENCY MEDICINE

## 2021-07-12 PROCEDURE — 84484 ASSAY OF TROPONIN QUANT: CPT | Mod: ER | Performed by: EMERGENCY MEDICINE

## 2021-07-12 PROCEDURE — 96360 HYDRATION IV INFUSION INIT: CPT | Mod: ER

## 2021-07-12 PROCEDURE — 93010 ELECTROCARDIOGRAM REPORT: CPT | Mod: ,,, | Performed by: INTERNAL MEDICINE

## 2021-07-12 PROCEDURE — 93005 ELECTROCARDIOGRAM TRACING: CPT | Mod: ER

## 2021-07-12 PROCEDURE — 81000 URINALYSIS NONAUTO W/SCOPE: CPT | Mod: ER | Performed by: EMERGENCY MEDICINE

## 2021-07-12 PROCEDURE — 80053 COMPREHEN METABOLIC PANEL: CPT | Mod: ER | Performed by: EMERGENCY MEDICINE

## 2021-07-12 PROCEDURE — 25000003 PHARM REV CODE 250: Mod: ER | Performed by: EMERGENCY MEDICINE

## 2021-07-12 PROCEDURE — 93010 EKG 12-LEAD: ICD-10-PCS | Mod: ,,, | Performed by: INTERNAL MEDICINE

## 2021-07-12 PROCEDURE — 85025 COMPLETE CBC W/AUTO DIFF WBC: CPT | Mod: ER | Performed by: EMERGENCY MEDICINE

## 2021-07-12 PROCEDURE — 83880 ASSAY OF NATRIURETIC PEPTIDE: CPT | Mod: ER | Performed by: EMERGENCY MEDICINE

## 2021-07-12 RX ADMIN — SODIUM CHLORIDE 500 ML: 0.9 INJECTION, SOLUTION INTRAVENOUS at 12:07
